# Patient Record
Sex: MALE | Race: WHITE | NOT HISPANIC OR LATINO
[De-identification: names, ages, dates, MRNs, and addresses within clinical notes are randomized per-mention and may not be internally consistent; named-entity substitution may affect disease eponyms.]

---

## 2018-02-15 ENCOUNTER — APPOINTMENT (OUTPATIENT)
Dept: GERIATRICS | Facility: CLINIC | Age: 83
End: 2018-02-15
Payer: MEDICARE

## 2018-02-15 VITALS
HEART RATE: 69 BPM | WEIGHT: 156 LBS | DIASTOLIC BLOOD PRESSURE: 80 MMHG | RESPIRATION RATE: 17 BRPM | BODY MASS INDEX: 23.64 KG/M2 | SYSTOLIC BLOOD PRESSURE: 140 MMHG | HEIGHT: 68 IN | OXYGEN SATURATION: 90 % | TEMPERATURE: 98.3 F

## 2018-02-15 DIAGNOSIS — E13.65 OTHER SPECIFIED DIABETES MELLITUS WITH HYPERGLYCEMIA: ICD-10-CM

## 2018-02-15 PROCEDURE — 99204 OFFICE O/P NEW MOD 45 MIN: CPT | Mod: GC

## 2018-02-16 ENCOUNTER — INPATIENT (INPATIENT)
Facility: HOSPITAL | Age: 83
LOS: 4 days | Discharge: HOME | DRG: 292 | End: 2018-02-21
Attending: INTERNAL MEDICINE | Admitting: INTERNAL MEDICINE
Payer: MEDICARE

## 2018-02-16 VITALS
OXYGEN SATURATION: 98 % | HEIGHT: 68 IN | SYSTOLIC BLOOD PRESSURE: 173 MMHG | RESPIRATION RATE: 20 BRPM | DIASTOLIC BLOOD PRESSURE: 68 MMHG | HEART RATE: 68 BPM | TEMPERATURE: 97 F | WEIGHT: 134.92 LBS

## 2018-02-16 DIAGNOSIS — Z98.890 OTHER SPECIFIED POSTPROCEDURAL STATES: Chronic | ICD-10-CM

## 2018-02-16 DIAGNOSIS — Z71.89 OTHER SPECIFIED COUNSELING: ICD-10-CM

## 2018-02-16 DIAGNOSIS — Z29.9 ENCOUNTER FOR PROPHYLACTIC MEASURES, UNSPECIFIED: ICD-10-CM

## 2018-02-16 DIAGNOSIS — N17.9 ACUTE KIDNEY FAILURE, UNSPECIFIED: ICD-10-CM

## 2018-02-16 DIAGNOSIS — I50.9 HEART FAILURE, UNSPECIFIED: ICD-10-CM

## 2018-02-16 DIAGNOSIS — I10 ESSENTIAL (PRIMARY) HYPERTENSION: ICD-10-CM

## 2018-02-16 DIAGNOSIS — F03.90 UNSPECIFIED DEMENTIA WITHOUT BEHAVIORAL DISTURBANCE: ICD-10-CM

## 2018-02-16 DIAGNOSIS — E11.9 TYPE 2 DIABETES MELLITUS WITHOUT COMPLICATIONS: ICD-10-CM

## 2018-02-16 LAB
ALBUMIN SERPL ELPH-MCNC: 3.2 G/DL — LOW (ref 3.5–5)
ALBUMIN SERPL ELPH-MCNC: 4 G/DL
ALP BLD-CCNC: 105 U/L
ALP SERPL-CCNC: 124 U/L — HIGH (ref 40–120)
ALT FLD-CCNC: 28 U/L DA — SIGNIFICANT CHANGE UP (ref 10–60)
ALT SERPL-CCNC: 21 U/L
ANION GAP SERPL CALC-SCNC: 16 MMOL/L
ANION GAP SERPL CALC-SCNC: 9 MMOL/L — SIGNIFICANT CHANGE UP (ref 5–17)
APTT BLD: 31.7 SEC — SIGNIFICANT CHANGE UP (ref 27.5–37.4)
AST SERPL-CCNC: 26 U/L
AST SERPL-CCNC: 32 U/L — SIGNIFICANT CHANGE UP (ref 10–40)
BASE EXCESS BLDV CALC-SCNC: 0.2 MMOL/L — SIGNIFICANT CHANGE UP (ref -2–2)
BASOPHILS # BLD AUTO: 0.01 K/UL
BASOPHILS # BLD AUTO: 0.1 K/UL — SIGNIFICANT CHANGE UP (ref 0–0.2)
BASOPHILS NFR BLD AUTO: 0.1 %
BASOPHILS NFR BLD AUTO: 0.8 % — SIGNIFICANT CHANGE UP (ref 0–2)
BILIRUB SERPL-MCNC: 0.3 MG/DL
BILIRUB SERPL-MCNC: 0.5 MG/DL — SIGNIFICANT CHANGE UP (ref 0.2–1.2)
BLOOD GAS COMMENTS, VENOUS: SIGNIFICANT CHANGE UP
BUN SERPL-MCNC: 29 MG/DL — HIGH (ref 7–18)
BUN SERPL-MCNC: 32 MG/DL
CALCIUM SERPL-MCNC: 8.3 MG/DL — LOW (ref 8.4–10.5)
CALCIUM SERPL-MCNC: 8.6 MG/DL
CHLORIDE SERPL-SCNC: 102 MMOL/L — SIGNIFICANT CHANGE UP (ref 96–108)
CHLORIDE SERPL-SCNC: 103 MMOL/L
CK MB BLD-MCNC: 2 % — SIGNIFICANT CHANGE UP (ref 0–3.5)
CK MB CFR SERPL CALC: 3.5 NG/ML — SIGNIFICANT CHANGE UP (ref 0–3.6)
CK SERPL-CCNC: 171 U/L — SIGNIFICANT CHANGE UP (ref 35–232)
CO2 SERPL-SCNC: 24 MMOL/L
CO2 SERPL-SCNC: 28 MMOL/L — SIGNIFICANT CHANGE UP (ref 22–31)
CREAT SERPL-MCNC: 1.45 MG/DL — HIGH (ref 0.5–1.3)
CREAT SERPL-MCNC: 1.53 MG/DL
CREAT SPEC-SCNC: 111 MG/DL
EOSINOPHIL # BLD AUTO: 0.2 K/UL — SIGNIFICANT CHANGE UP (ref 0–0.5)
EOSINOPHIL # BLD AUTO: 0.23 K/UL
EOSINOPHIL NFR BLD AUTO: 1.9 % — SIGNIFICANT CHANGE UP (ref 0–6)
EOSINOPHIL NFR BLD AUTO: 2.6 %
FOLATE SERPL-MCNC: 9.3 NG/ML
GLUCOSE SERPL-MCNC: 131 MG/DL — HIGH (ref 70–99)
GLUCOSE SERPL-MCNC: 75 MG/DL
HBA1C MFR BLD HPLC: 7.5 %
HCO3 BLDV-SCNC: 27 MMOL/L — SIGNIFICANT CHANGE UP (ref 21–29)
HCT VFR BLD CALC: 33.1 %
HCT VFR BLD CALC: 35.5 % — LOW (ref 39–50)
HGB BLD-MCNC: 10.2 G/DL
HGB BLD-MCNC: 10.9 G/DL — LOW (ref 13–17)
HOROWITZ INDEX BLDV+IHG-RTO: 21 — SIGNIFICANT CHANGE UP
IMM GRANULOCYTES NFR BLD AUTO: 0.1 %
INR BLD: 1.11 RATIO — SIGNIFICANT CHANGE UP (ref 0.88–1.16)
LYMPHOCYTES # BLD AUTO: 1.84 K/UL
LYMPHOCYTES # BLD AUTO: 2.9 K/UL — SIGNIFICANT CHANGE UP (ref 1–3.3)
LYMPHOCYTES # BLD AUTO: 25.5 % — SIGNIFICANT CHANGE UP (ref 13–44)
LYMPHOCYTES NFR BLD AUTO: 21.2 %
MAN DIFF?: NORMAL
MCHC RBC-ENTMCNC: 30.4 PG
MCHC RBC-ENTMCNC: 30.4 PG — SIGNIFICANT CHANGE UP (ref 27–34)
MCHC RBC-ENTMCNC: 30.8 GM/DL
MCHC RBC-ENTMCNC: 30.8 GM/DL — LOW (ref 32–36)
MCV RBC AUTO: 98.5 FL
MCV RBC AUTO: 98.7 FL — SIGNIFICANT CHANGE UP (ref 80–100)
MICROALBUMIN 24H UR DL<=1MG/L-MCNC: 70.3 MG/DL
MICROALBUMIN/CREAT 24H UR-RTO: 633 MG/G
MONOCYTES # BLD AUTO: 0.72 K/UL
MONOCYTES # BLD AUTO: 0.9 K/UL — SIGNIFICANT CHANGE UP (ref 0–0.9)
MONOCYTES NFR BLD AUTO: 7.6 % — SIGNIFICANT CHANGE UP (ref 2–14)
MONOCYTES NFR BLD AUTO: 8.3 %
NEUTROPHILS # BLD AUTO: 5.88 K/UL
NEUTROPHILS # BLD AUTO: 7.2 K/UL — SIGNIFICANT CHANGE UP (ref 1.8–7.4)
NEUTROPHILS NFR BLD AUTO: 64.2 % — SIGNIFICANT CHANGE UP (ref 43–77)
NEUTROPHILS NFR BLD AUTO: 67.7 %
NT-PROBNP SERPL-SCNC: 5107 PG/ML — HIGH (ref 0–450)
PCO2 BLDV: 60 MMHG — HIGH (ref 35–50)
PH BLDV: 7.28 — LOW (ref 7.35–7.45)
PLATELET # BLD AUTO: 196 K/UL
PLATELET # BLD AUTO: 220 K/UL — SIGNIFICANT CHANGE UP (ref 150–400)
PO2 BLDV: 111 MMHG — HIGH (ref 25–45)
POTASSIUM SERPL-MCNC: 3.3 MMOL/L — LOW (ref 3.5–5.3)
POTASSIUM SERPL-SCNC: 3.3 MMOL/L — LOW (ref 3.5–5.3)
POTASSIUM SERPL-SCNC: 4.2 MMOL/L
PROT SERPL-MCNC: 6.9 G/DL
PROT SERPL-MCNC: 8 G/DL — SIGNIFICANT CHANGE UP (ref 6–8.3)
PROTHROM AB SERPL-ACNC: 12.1 SEC — SIGNIFICANT CHANGE UP (ref 9.8–12.7)
RBC # BLD: 3.36 M/UL
RBC # BLD: 3.59 M/UL — LOW (ref 4.2–5.8)
RBC # FLD: 13.4 % — SIGNIFICANT CHANGE UP (ref 10.3–14.5)
RBC # FLD: 14.6 %
SAO2 % BLDV: 98 % — HIGH (ref 67–88)
SODIUM SERPL-SCNC: 139 MMOL/L — SIGNIFICANT CHANGE UP (ref 135–145)
SODIUM SERPL-SCNC: 143 MMOL/L
TROPONIN I SERPL-MCNC: <0.015 NG/ML — SIGNIFICANT CHANGE UP (ref 0–0.04)
TSH SERPL-ACNC: 2.79 UIU/ML
VIT B12 SERPL-MCNC: 1287 PG/ML
WBC # BLD: 11.3 K/UL — HIGH (ref 3.8–10.5)
WBC # FLD AUTO: 11.3 K/UL — HIGH (ref 3.8–10.5)
WBC # FLD AUTO: 8.69 K/UL

## 2018-02-16 PROCEDURE — 71045 X-RAY EXAM CHEST 1 VIEW: CPT | Mod: 26

## 2018-02-16 PROCEDURE — 99285 EMERGENCY DEPT VISIT HI MDM: CPT

## 2018-02-16 RX ORDER — DULOXETINE HYDROCHLORIDE 30 MG/1
30 CAPSULE, DELAYED RELEASE ORAL DAILY
Qty: 0 | Refills: 0 | Status: DISCONTINUED | OUTPATIENT
Start: 2018-02-16 | End: 2018-02-21

## 2018-02-16 RX ORDER — SIMVASTATIN 20 MG/1
5 TABLET, FILM COATED ORAL AT BEDTIME
Qty: 0 | Refills: 0 | Status: DISCONTINUED | OUTPATIENT
Start: 2018-02-16 | End: 2018-02-21

## 2018-02-16 RX ORDER — ATENOLOL 25 MG/1
50 TABLET ORAL DAILY
Qty: 0 | Refills: 0 | Status: DISCONTINUED | OUTPATIENT
Start: 2018-02-17 | End: 2018-02-21

## 2018-02-16 RX ORDER — INSULIN GLARGINE 100 [IU]/ML
7 INJECTION, SOLUTION SUBCUTANEOUS AT BEDTIME
Qty: 0 | Refills: 0 | Status: DISCONTINUED | OUTPATIENT
Start: 2018-02-17 | End: 2018-02-21

## 2018-02-16 RX ORDER — DONEPEZIL HYDROCHLORIDE 10 MG/1
10 TABLET, FILM COATED ORAL AT BEDTIME
Qty: 0 | Refills: 0 | Status: DISCONTINUED | OUTPATIENT
Start: 2018-02-16 | End: 2018-02-21

## 2018-02-16 RX ORDER — POTASSIUM CHLORIDE 20 MEQ
40 PACKET (EA) ORAL ONCE
Qty: 0 | Refills: 0 | Status: COMPLETED | OUTPATIENT
Start: 2018-02-16 | End: 2018-02-16

## 2018-02-16 RX ORDER — LEVOTHYROXINE SODIUM 125 MCG
112 TABLET ORAL DAILY
Qty: 0 | Refills: 0 | Status: DISCONTINUED | OUTPATIENT
Start: 2018-02-16 | End: 2018-02-21

## 2018-02-16 RX ORDER — ASPIRIN/CALCIUM CARB/MAGNESIUM 324 MG
81 TABLET ORAL DAILY
Qty: 0 | Refills: 0 | Status: DISCONTINUED | OUTPATIENT
Start: 2018-02-16 | End: 2018-02-21

## 2018-02-16 RX ORDER — FUROSEMIDE 40 MG
40 TABLET ORAL DAILY
Qty: 0 | Refills: 0 | Status: DISCONTINUED | OUTPATIENT
Start: 2018-02-17 | End: 2018-02-18

## 2018-02-16 RX ORDER — AMLODIPINE BESYLATE 2.5 MG/1
10 TABLET ORAL DAILY
Qty: 0 | Refills: 0 | Status: DISCONTINUED | OUTPATIENT
Start: 2018-02-16 | End: 2018-02-21

## 2018-02-16 RX ORDER — FUROSEMIDE 40 MG
40 TABLET ORAL ONCE
Qty: 0 | Refills: 0 | Status: COMPLETED | OUTPATIENT
Start: 2018-02-16 | End: 2018-02-16

## 2018-02-16 RX ORDER — INSULIN LISPRO 100/ML
VIAL (ML) SUBCUTANEOUS
Qty: 0 | Refills: 0 | Status: DISCONTINUED | OUTPATIENT
Start: 2018-02-16 | End: 2018-02-21

## 2018-02-16 RX ORDER — PRAMIPEXOLE DIHYDROCHLORIDE 0.12 MG/1
0.25 TABLET ORAL THREE TIMES A DAY
Qty: 0 | Refills: 0 | Status: DISCONTINUED | OUTPATIENT
Start: 2018-02-16 | End: 2018-02-21

## 2018-02-16 RX ORDER — QUETIAPINE FUMARATE 200 MG/1
25 TABLET, FILM COATED ORAL DAILY
Qty: 0 | Refills: 0 | Status: DISCONTINUED | OUTPATIENT
Start: 2018-02-16 | End: 2018-02-21

## 2018-02-16 RX ORDER — HEPARIN SODIUM 5000 [USP'U]/ML
5000 INJECTION INTRAVENOUS; SUBCUTANEOUS EVERY 12 HOURS
Qty: 0 | Refills: 0 | Status: DISCONTINUED | OUTPATIENT
Start: 2018-02-16 | End: 2018-02-21

## 2018-02-16 RX ADMIN — Medication 40 MILLIGRAM(S): at 17:21

## 2018-02-16 RX ADMIN — Medication 40 MILLIEQUIVALENT(S): at 23:44

## 2018-02-16 NOTE — H&P ADULT - FAMILY HISTORY
Mother  Still living? Unknown  Family history of stroke, Age at diagnosis: Age Unknown  Family history of hypertension, Age at diagnosis: Age Unknown

## 2018-02-16 NOTE — H&P ADULT - PROBLEM SELECTOR PLAN 1
-No known prior history as per patient's wife.   -CXR: symmetrically prominent vascular markings in mid lower lung field suggesting CHF.   -Pro BNP 5107.   -EKG: NSR with TWI in 1, aVL (no prior ekg to compare).   -Trop x 1 is negative.   -c/w IV Lasix 40 mg daily, Strict I/Os, daily weights.   -f/u ECHO.   -Cardio Dr. Florentino

## 2018-02-16 NOTE — H&P ADULT - PROBLEM SELECTOR PLAN 2
-per wife, patient was seen by PCP recently and told that his kidney function is slightly affected, though not sure of exact numbers.   -Cr of 1.45 on admission, BUN / Cr ratio is 20.   -Send urine lytes.  -Monitor BMP, if Cr does not improve consider renal US.

## 2018-02-16 NOTE — H&P ADULT - PROBLEM SELECTOR PLAN 4
-Patient takes Insulin Toujeo 7 U qhs, Humalog 5 U tid pre meals and Glipizide 5 mg at home  -c/w Lantus 7 U qhs and HSS   -monitor accu cheks as hs and adjust insulin regimen if needed  -f/u HbA1c

## 2018-02-16 NOTE — H&P ADULT - RS GEN PE MLT RESP DETAILS PC
airway patent/clear to auscultation bilaterally/breath sounds equal/respirations non-labored/good air movement

## 2018-02-16 NOTE — H&P ADULT - PROBLEM SELECTOR PLAN 7
-Discussed with HCP (wife: Albina Estrella [phone]) over the phone. She wants to speak to son and make the decision.   -FULL CODE for now.

## 2018-02-16 NOTE — ED PROVIDER NOTE - PSYCHIATRIC, MLM
Alert and oriented to person, place, time. normal mood and affect. no apparent risk to self or others.

## 2018-02-16 NOTE — H&P ADULT - HISTORY OF PRESENT ILLNESS
88 years old male from home, lives with wife, FELICITY x2 at baseline, ambulates with walker, PMH of HTN, DM and Dementia and PSH of Hernia surgery brought to ED by wife for shortness of breath since last night. Patient was seen by his PCP yesterday, had blood and urine tests done and was told his kidney function is slightly affected. Per wife no other complains like chest pain, palpitations, nausea, vomiting, diarrhea, fever, chills, recent travel or sick contacts. Patient has got his flu vaccine and Pneumonia vaccine as per wife. Recent hospitalization after he threatened his wife and police was called. 88 years old male from home, lives with wife, FELICITY x2 at baseline, ambulates with walker, PMH of HTN, DM and Dementia and PSH of Hernia surgery brought to ED by wife for shortness of breath since last night. Patient was seen by his PCP yesterday, had blood and urine tests done and was told his kidney function is slightly affected. Patient is AAO x2 currently , denies any complains and looks anxious. Per wife no other complains like chest pain, palpitations, nausea, vomiting, diarrhea, fever, chills, recent travel or sick contacts. Patient has got his flu vaccine and Pneumonia vaccine as per wife. Had a recent hospitalization i after he threatened his wife and police was called. Has a bruise on right flank from a fall past weekend.

## 2018-02-16 NOTE — ED PROVIDER NOTE - OBJECTIVE STATEMENT
patient was brought in by his wife as he was having difficulty breathing last night. she states he saw his pmd yesterday and had blood and urine tests done and she was told he has a problem with his kidney labs. the patient stated he had diarrhea last night but the wife didn't see this and doesn't think its true due to dementia. he has a bruise on his right flank from a fall this past weekend.

## 2018-02-17 LAB
ANION GAP SERPL CALC-SCNC: 12 MMOL/L — SIGNIFICANT CHANGE UP (ref 5–17)
BASOPHILS # BLD AUTO: 0 K/UL — SIGNIFICANT CHANGE UP (ref 0–0.2)
BASOPHILS NFR BLD AUTO: 0.5 % — SIGNIFICANT CHANGE UP (ref 0–2)
BUN SERPL-MCNC: 30 MG/DL — HIGH (ref 7–18)
CALCIUM SERPL-MCNC: 8.6 MG/DL — SIGNIFICANT CHANGE UP (ref 8.4–10.5)
CHLORIDE SERPL-SCNC: 101 MMOL/L — SIGNIFICANT CHANGE UP (ref 96–108)
CHOLEST SERPL-MCNC: 114 MG/DL — SIGNIFICANT CHANGE UP (ref 10–199)
CK MB BLD-MCNC: 2.6 % — SIGNIFICANT CHANGE UP (ref 0–3.5)
CK MB BLD-MCNC: 2.7 % — SIGNIFICANT CHANGE UP (ref 0–3.5)
CK MB CFR SERPL CALC: 4.7 NG/ML — HIGH (ref 0–3.6)
CK MB CFR SERPL CALC: 5 NG/ML — HIGH (ref 0–3.6)
CK SERPL-CCNC: 178 U/L — SIGNIFICANT CHANGE UP (ref 35–232)
CK SERPL-CCNC: 185 U/L — SIGNIFICANT CHANGE UP (ref 35–232)
CO2 SERPL-SCNC: 26 MMOL/L — SIGNIFICANT CHANGE UP (ref 22–31)
CREAT SERPL-MCNC: 1.61 MG/DL — HIGH (ref 0.5–1.3)
EOSINOPHIL # BLD AUTO: 0 K/UL — SIGNIFICANT CHANGE UP (ref 0–0.5)
EOSINOPHIL NFR BLD AUTO: 0.5 % — SIGNIFICANT CHANGE UP (ref 0–6)
FOLATE SERPL-MCNC: 7 NG/ML — SIGNIFICANT CHANGE UP (ref 4.8–24.2)
GLUCOSE BLDC GLUCOMTR-MCNC: 172 MG/DL — HIGH (ref 70–99)
GLUCOSE BLDC GLUCOMTR-MCNC: 175 MG/DL — HIGH (ref 70–99)
GLUCOSE BLDC GLUCOMTR-MCNC: 181 MG/DL — HIGH (ref 70–99)
GLUCOSE BLDC GLUCOMTR-MCNC: 232 MG/DL — HIGH (ref 70–99)
GLUCOSE SERPL-MCNC: 239 MG/DL — HIGH (ref 70–99)
HBA1C BLD-MCNC: 7.7 % — HIGH (ref 4–5.6)
HCT VFR BLD CALC: 32.5 % — LOW (ref 39–50)
HDLC SERPL-MCNC: 33 MG/DL — LOW (ref 40–125)
HGB BLD-MCNC: 10.5 G/DL — LOW (ref 13–17)
LIPID PNL WITH DIRECT LDL SERPL: 51 MG/DL — SIGNIFICANT CHANGE UP
LYMPHOCYTES # BLD AUTO: 1 K/UL — SIGNIFICANT CHANGE UP (ref 1–3.3)
LYMPHOCYTES # BLD AUTO: 11.3 % — LOW (ref 13–44)
MAGNESIUM SERPL-MCNC: 2.1 MG/DL — SIGNIFICANT CHANGE UP (ref 1.6–2.6)
MCHC RBC-ENTMCNC: 32.2 GM/DL — SIGNIFICANT CHANGE UP (ref 32–36)
MCHC RBC-ENTMCNC: 32.4 PG — SIGNIFICANT CHANGE UP (ref 27–34)
MCV RBC AUTO: 100.6 FL — HIGH (ref 80–100)
MONOCYTES # BLD AUTO: 0.7 K/UL — SIGNIFICANT CHANGE UP (ref 0–0.9)
MONOCYTES NFR BLD AUTO: 8.3 % — SIGNIFICANT CHANGE UP (ref 2–14)
NEUTROPHILS # BLD AUTO: 7.1 K/UL — SIGNIFICANT CHANGE UP (ref 1.8–7.4)
NEUTROPHILS NFR BLD AUTO: 79.4 % — HIGH (ref 43–77)
PHOSPHATE SERPL-MCNC: 3.6 MG/DL — SIGNIFICANT CHANGE UP (ref 2.5–4.5)
PLATELET # BLD AUTO: 196 K/UL — SIGNIFICANT CHANGE UP (ref 150–400)
POTASSIUM SERPL-MCNC: 3.6 MMOL/L — SIGNIFICANT CHANGE UP (ref 3.5–5.3)
POTASSIUM SERPL-SCNC: 3.6 MMOL/L — SIGNIFICANT CHANGE UP (ref 3.5–5.3)
RBC # BLD: 3.23 M/UL — LOW (ref 4.2–5.8)
RBC # FLD: 13.4 % — SIGNIFICANT CHANGE UP (ref 10.3–14.5)
SODIUM SERPL-SCNC: 139 MMOL/L — SIGNIFICANT CHANGE UP (ref 135–145)
TOTAL CHOLESTEROL/HDL RATIO MEASUREMENT: 3.5 RATIO — SIGNIFICANT CHANGE UP (ref 3.4–9.6)
TRIGL SERPL-MCNC: 148 MG/DL — SIGNIFICANT CHANGE UP (ref 10–149)
TROPONIN I SERPL-MCNC: 0.03 NG/ML — SIGNIFICANT CHANGE UP (ref 0–0.04)
TROPONIN I SERPL-MCNC: 0.03 NG/ML — SIGNIFICANT CHANGE UP (ref 0–0.04)
TSH SERPL-MCNC: 3.16 UU/ML — SIGNIFICANT CHANGE UP (ref 0.34–4.82)
VIT B12 SERPL-MCNC: 1450 PG/ML — HIGH (ref 232–1245)
WBC # BLD: 9 K/UL — SIGNIFICANT CHANGE UP (ref 3.8–10.5)
WBC # FLD AUTO: 9 K/UL — SIGNIFICANT CHANGE UP (ref 3.8–10.5)

## 2018-02-17 RX ORDER — IPRATROPIUM/ALBUTEROL SULFATE 18-103MCG
3 AEROSOL WITH ADAPTER (GRAM) INHALATION ONCE
Qty: 0 | Refills: 0 | Status: COMPLETED | OUTPATIENT
Start: 2018-02-17 | End: 2018-02-17

## 2018-02-17 RX ADMIN — Medication 200 MILLIGRAM(S): at 22:46

## 2018-02-17 RX ADMIN — ATENOLOL 50 MILLIGRAM(S): 25 TABLET ORAL at 05:36

## 2018-02-17 RX ADMIN — Medication 40 MILLIGRAM(S): at 05:39

## 2018-02-17 RX ADMIN — DONEPEZIL HYDROCHLORIDE 10 MILLIGRAM(S): 10 TABLET, FILM COATED ORAL at 21:41

## 2018-02-17 RX ADMIN — Medication 81 MILLIGRAM(S): at 12:12

## 2018-02-17 RX ADMIN — AMLODIPINE BESYLATE 10 MILLIGRAM(S): 2.5 TABLET ORAL at 01:22

## 2018-02-17 RX ADMIN — Medication 3 MILLILITER(S): at 05:02

## 2018-02-17 RX ADMIN — PRAMIPEXOLE DIHYDROCHLORIDE 0.25 MILLIGRAM(S): 0.12 TABLET ORAL at 05:36

## 2018-02-17 RX ADMIN — PRAMIPEXOLE DIHYDROCHLORIDE 0.25 MILLIGRAM(S): 0.12 TABLET ORAL at 21:42

## 2018-02-17 RX ADMIN — HEPARIN SODIUM 5000 UNIT(S): 5000 INJECTION INTRAVENOUS; SUBCUTANEOUS at 05:39

## 2018-02-17 RX ADMIN — Medication 112 MICROGRAM(S): at 05:36

## 2018-02-17 RX ADMIN — SIMVASTATIN 5 MILLIGRAM(S): 20 TABLET, FILM COATED ORAL at 21:43

## 2018-02-17 RX ADMIN — QUETIAPINE FUMARATE 25 MILLIGRAM(S): 200 TABLET, FILM COATED ORAL at 12:12

## 2018-02-17 RX ADMIN — Medication 1: at 12:06

## 2018-02-17 RX ADMIN — Medication 1: at 17:08

## 2018-02-17 RX ADMIN — INSULIN GLARGINE 7 UNIT(S): 100 INJECTION, SOLUTION SUBCUTANEOUS at 21:41

## 2018-02-17 RX ADMIN — PRAMIPEXOLE DIHYDROCHLORIDE 0.25 MILLIGRAM(S): 0.12 TABLET ORAL at 16:07

## 2018-02-17 RX ADMIN — HEPARIN SODIUM 5000 UNIT(S): 5000 INJECTION INTRAVENOUS; SUBCUTANEOUS at 17:09

## 2018-02-17 RX ADMIN — DULOXETINE HYDROCHLORIDE 30 MILLIGRAM(S): 30 CAPSULE, DELAYED RELEASE ORAL at 12:12

## 2018-02-17 RX ADMIN — Medication 2: at 08:06

## 2018-02-17 NOTE — PHYSICAL THERAPY INITIAL EVALUATION ADULT - ADDITIONAL COMMENTS
Pt claims to be modified independent in basic ADLs, ambulates ad christiano in the house with RW, wife assists as needed

## 2018-02-17 NOTE — PHYSICAL THERAPY INITIAL EVALUATION ADULT - GENERAL OBSERVATIONS, REHAB EVAL
Pt seen supine in bed w/tele, denied any c/o pain, very [place], was cooperative during assessment despite repetitively saying he's in the bad mood

## 2018-02-17 NOTE — CONSULT NOTE ADULT - SUBJECTIVE AND OBJECTIVE BOX
CARDIOLOGY ATTENDING      HISTORY OF PRESENT ILLNESS: He is an 87 y/o male PMH mild dementia admitted with dyspnea. CXR is concerning for pulmonary edema. No echo on file. He denies palpitations, syncope, nor angina.      PAST MEDICAL & SURGICAL HISTORY:  Dementia  HTN (hypertension)  Diabetes  History of hernia surgery      MEDICATIONS  (STANDING):  amLODIPine   Tablet 10 milliGRAM(s) Oral daily  aspirin  chewable 81 milliGRAM(s) Oral daily  ATENolol  Tablet 50 milliGRAM(s) Oral daily  donepezil 10 milliGRAM(s) Oral at bedtime  DULoxetine 30 milliGRAM(s) Oral daily  furosemide   Injectable 40 milliGRAM(s) IV Push daily  heparin  Injectable 5000 Unit(s) SubCutaneous every 12 hours  insulin glargine Injectable (LANTUS) 7 Unit(s) SubCutaneous at bedtime  insulin lispro (HumaLOG) corrective regimen sliding scale   SubCutaneous three times a day before meals  levothyroxine 112 MICROGram(s) Oral daily  pramipexole 0.25 milliGRAM(s) Oral three times a day  pregabalin 200 milliGRAM(s) Oral at bedtime  QUEtiapine 25 milliGRAM(s) Oral daily  simvastatin 5 milliGRAM(s) Oral at bedtime    no Known Allergies    FAMILY HISTORY:  Family history of hypertension (Mother)  Family history of stroke (Mother)    Non-contributary for premature coronary disease or sudden cardiac death    SOCIAL HISTORY:    [x ] Non-smoker  [ ] Smoker  [ ] Alcohol      REVIEW OF SYSTEMS:  [ ]chest pain  [ x ]shortness of breath  [  ]palpitations  [  ]syncope  [ ]near syncope [ ]upper extremity weakness   [ ] lower extremity weakness  [  ]diplopia  [  ]altered mental status   [  ]fevers  [ ]chills [ ]nausea  [ ]vomitting  [  ]dysphagia    [ ]abdominal pain  [ ]melena  [ ]BRBPR    [  ]epistaxis  [  ]rash    [ ]lower extremity edema        [x ] All others negative	  [ ] Unable to obtain    PHYSICAL EXAM:  T(C): 36.8 (02-17-18 @ 12:02), Max: 36.8 (02-17-18 @ 12:02)  HR: 64 (02-17-18 @ 12:02) (64 - 80)  BP: 157/62 (02-17-18 @ 12:02) (149/62 - 186/58)  RR: 18 (02-17-18 @ 12:02) (17 - 20)  SpO2: 100% (02-17-18 @ 12:02) (95% - 100%)  Wt(kg): --    no JVD  RRR, no murmurs  CTAB  soft nt/nd  no c/c/e      TELEMETRY: 	  no events  ECG:  	NSR, Twi in I/L    Echo: pending  NST: n/a  Cath: n/a  	  	  LABS:	 	                          10.5   9.0   )-----------( 196      ( 17 Feb 2018 07:50 )             32.5     02-17    139  |  101  |  30<H>  ----------------------------<  239<H>  3.6   |  26  |  1.61<H>    Ca    8.6      17 Feb 2018 07:50  Phos  3.6     02-17  Mg     2.1     02-17    TPro  8.0  /  Alb  3.2<L>  /  TBili  0.5  /  DBili  x   /  AST  32  /  ALT  28  /  AlkPhos  124<H>  02-16    proBNP: Serum Pro-Brain Natriuretic Peptide: 5107 pg/mL (02-16 @ 17:13)    Lipid Profile:   HgA1c:   TSH: Thyroid Stimulating Hormone, Serum: 3.16 uU/mL (02-17 @ 07:50)      A/P) He is an 87 y/o male PMH mild dementia admitted with dyspnea. CXR is concerning for pulmonary edema. No echo on file. He denies palpitations, syncope, nor angina.    -his signs and symptoms are concerning for CHF  -start lasix 40mg iv q12h  -get echo  -final cardiology reccs pending echo      hCang Parr M.D., [facility]  Cardiac Electrophysiology  [facility]  [address] [place] [address], [address] [place] [address]  [email or web address]    office [phone]  pager [phone]

## 2018-02-18 ENCOUNTER — TRANSCRIPTION ENCOUNTER (OUTPATIENT)
Age: 83
End: 2018-02-18

## 2018-02-18 LAB
24R-OH-CALCIDIOL SERPL-MCNC: 14.2 NG/ML — LOW (ref 30–80)
ANION GAP SERPL CALC-SCNC: 8 MMOL/L — SIGNIFICANT CHANGE UP (ref 5–17)
BASOPHILS # BLD AUTO: 0.1 K/UL — SIGNIFICANT CHANGE UP (ref 0–0.2)
BASOPHILS NFR BLD AUTO: 1 % — SIGNIFICANT CHANGE UP (ref 0–2)
BUN SERPL-MCNC: 33 MG/DL — HIGH (ref 7–18)
CALCIUM SERPL-MCNC: 8.1 MG/DL — LOW (ref 8.4–10.5)
CHLORIDE SERPL-SCNC: 100 MMOL/L — SIGNIFICANT CHANGE UP (ref 96–108)
CO2 SERPL-SCNC: 33 MMOL/L — HIGH (ref 22–31)
CREAT SERPL-MCNC: 1.33 MG/DL — HIGH (ref 0.5–1.3)
EOSINOPHIL # BLD AUTO: 0.3 K/UL — SIGNIFICANT CHANGE UP (ref 0–0.5)
EOSINOPHIL NFR BLD AUTO: 5.3 % — SIGNIFICANT CHANGE UP (ref 0–6)
GLUCOSE BLDC GLUCOMTR-MCNC: 125 MG/DL — HIGH (ref 70–99)
GLUCOSE BLDC GLUCOMTR-MCNC: 160 MG/DL — HIGH (ref 70–99)
GLUCOSE BLDC GLUCOMTR-MCNC: 254 MG/DL — HIGH (ref 70–99)
GLUCOSE BLDC GLUCOMTR-MCNC: 271 MG/DL — HIGH (ref 70–99)
GLUCOSE SERPL-MCNC: 121 MG/DL — HIGH (ref 70–99)
HCT VFR BLD CALC: 31.8 % — LOW (ref 39–50)
HGB BLD-MCNC: 10.1 G/DL — LOW (ref 13–17)
LYMPHOCYTES # BLD AUTO: 1.5 K/UL — SIGNIFICANT CHANGE UP (ref 1–3.3)
LYMPHOCYTES # BLD AUTO: 23.8 % — SIGNIFICANT CHANGE UP (ref 13–44)
MCHC RBC-ENTMCNC: 31.8 GM/DL — LOW (ref 32–36)
MCHC RBC-ENTMCNC: 31.8 PG — SIGNIFICANT CHANGE UP (ref 27–34)
MCV RBC AUTO: 100.3 FL — HIGH (ref 80–100)
MONOCYTES # BLD AUTO: 0.6 K/UL — SIGNIFICANT CHANGE UP (ref 0–0.9)
MONOCYTES NFR BLD AUTO: 10.3 % — SIGNIFICANT CHANGE UP (ref 2–14)
NEUTROPHILS # BLD AUTO: 3.7 K/UL — SIGNIFICANT CHANGE UP (ref 1.8–7.4)
NEUTROPHILS NFR BLD AUTO: 59.6 % — SIGNIFICANT CHANGE UP (ref 43–77)
PLATELET # BLD AUTO: 213 K/UL — SIGNIFICANT CHANGE UP (ref 150–400)
POTASSIUM SERPL-MCNC: 2.9 MMOL/L — CRITICAL LOW (ref 3.5–5.3)
POTASSIUM SERPL-SCNC: 2.9 MMOL/L — CRITICAL LOW (ref 3.5–5.3)
RBC # BLD: 3.18 M/UL — LOW (ref 4.2–5.8)
RBC # FLD: 13.1 % — SIGNIFICANT CHANGE UP (ref 10.3–14.5)
SODIUM SERPL-SCNC: 141 MMOL/L — SIGNIFICANT CHANGE UP (ref 135–145)
WBC # BLD: 6.2 K/UL — SIGNIFICANT CHANGE UP (ref 3.8–10.5)
WBC # FLD AUTO: 6.2 K/UL — SIGNIFICANT CHANGE UP (ref 3.8–10.5)

## 2018-02-18 RX ORDER — FUROSEMIDE 40 MG
40 TABLET ORAL DAILY
Qty: 0 | Refills: 0 | Status: DISCONTINUED | OUTPATIENT
Start: 2018-02-19 | End: 2018-02-20

## 2018-02-18 RX ORDER — ERGOCALCIFEROL 1.25 MG/1
50000 CAPSULE ORAL
Qty: 0 | Refills: 0 | Status: DISCONTINUED | OUTPATIENT
Start: 2018-02-18 | End: 2018-02-21

## 2018-02-18 RX ORDER — POTASSIUM CHLORIDE 20 MEQ
40 PACKET (EA) ORAL EVERY 4 HOURS
Qty: 0 | Refills: 0 | Status: COMPLETED | OUTPATIENT
Start: 2018-02-18 | End: 2018-02-18

## 2018-02-18 RX ADMIN — PRAMIPEXOLE DIHYDROCHLORIDE 0.25 MILLIGRAM(S): 0.12 TABLET ORAL at 17:45

## 2018-02-18 RX ADMIN — Medication 81 MILLIGRAM(S): at 12:35

## 2018-02-18 RX ADMIN — QUETIAPINE FUMARATE 25 MILLIGRAM(S): 200 TABLET, FILM COATED ORAL at 12:33

## 2018-02-18 RX ADMIN — AMLODIPINE BESYLATE 10 MILLIGRAM(S): 2.5 TABLET ORAL at 05:43

## 2018-02-18 RX ADMIN — PRAMIPEXOLE DIHYDROCHLORIDE 0.25 MILLIGRAM(S): 0.12 TABLET ORAL at 05:43

## 2018-02-18 RX ADMIN — Medication 40 MILLIEQUIVALENT(S): at 15:55

## 2018-02-18 RX ADMIN — ATENOLOL 50 MILLIGRAM(S): 25 TABLET ORAL at 05:43

## 2018-02-18 RX ADMIN — SIMVASTATIN 5 MILLIGRAM(S): 20 TABLET, FILM COATED ORAL at 21:56

## 2018-02-18 RX ADMIN — Medication 112 MICROGRAM(S): at 05:43

## 2018-02-18 RX ADMIN — DONEPEZIL HYDROCHLORIDE 10 MILLIGRAM(S): 10 TABLET, FILM COATED ORAL at 21:57

## 2018-02-18 RX ADMIN — HEPARIN SODIUM 5000 UNIT(S): 5000 INJECTION INTRAVENOUS; SUBCUTANEOUS at 05:44

## 2018-02-18 RX ADMIN — DULOXETINE HYDROCHLORIDE 30 MILLIGRAM(S): 30 CAPSULE, DELAYED RELEASE ORAL at 12:33

## 2018-02-18 RX ADMIN — Medication 40 MILLIEQUIVALENT(S): at 12:33

## 2018-02-18 RX ADMIN — Medication 200 MILLIGRAM(S): at 22:05

## 2018-02-18 RX ADMIN — INSULIN GLARGINE 7 UNIT(S): 100 INJECTION, SOLUTION SUBCUTANEOUS at 21:56

## 2018-02-18 RX ADMIN — Medication 1: at 17:43

## 2018-02-18 RX ADMIN — PRAMIPEXOLE DIHYDROCHLORIDE 0.25 MILLIGRAM(S): 0.12 TABLET ORAL at 21:55

## 2018-02-18 RX ADMIN — Medication 40 MILLIGRAM(S): at 05:44

## 2018-02-18 RX ADMIN — Medication 3: at 12:33

## 2018-02-18 NOTE — PROGRESS NOTE ADULT - PROBLEM SELECTOR PLAN 2
-per wife, patient was seen by PCP recently and told that his kidney function is slightly affected, though not sure of exact numbers.   -Cr of 1.45 on admission, BUN / Cr ratio is 20.   -f/u urine lytes  -Monitor BMP, if Cr does not improve consider renal US. -per wife, patient was seen by PCP recently and told that his kidney function is slightly affected, though not sure of exact numbers.   -Cr of 1.45 on admission, BUN / Cr ratio is 20.   -Cr improving on lasix  f/u BMP

## 2018-02-18 NOTE — PROGRESS NOTE ADULT - SUBJECTIVE AND OBJECTIVE BOX
PGY 1 Note discussed with supervising resident and primary attending    Patient is a 88y old  Male who presents with a chief complaint of dyspnea (16 Feb 2018 21:45)      INTERVAL HPI/OVERNIGHT EVENTS: no new complaints    MEDICATIONS  (STANDING):  amLODIPine   Tablet 10 milliGRAM(s) Oral daily  aspirin  chewable 81 milliGRAM(s) Oral daily  ATENolol  Tablet 50 milliGRAM(s) Oral daily  donepezil 10 milliGRAM(s) Oral at bedtime  DULoxetine 30 milliGRAM(s) Oral daily  furosemide   Injectable 40 milliGRAM(s) IV Push daily  heparin  Injectable 5000 Unit(s) SubCutaneous every 12 hours  insulin glargine Injectable (LANTUS) 7 Unit(s) SubCutaneous at bedtime  insulin lispro (HumaLOG) corrective regimen sliding scale   SubCutaneous three times a day before meals  levothyroxine 112 MICROGram(s) Oral daily  pramipexole 0.25 milliGRAM(s) Oral three times a day  pregabalin 200 milliGRAM(s) Oral at bedtime  QUEtiapine 25 milliGRAM(s) Oral daily  simvastatin 5 milliGRAM(s) Oral at bedtime    MEDICATIONS  (PRN):      __________________________________________________  REVIEW OF SYSTEMS:    CONSTITUTIONAL: No fever,   EYES: no acute visual disturbances  NECK: No pain or stiffness  RESPIRATORY: No cough; No shortness of breath  CARDIOVASCULAR: No chest pain, no palpitations  GASTROINTESTINAL: No pain. No nausea or vomiting; No diarrhea   NEUROLOGICAL: No headache or numbness, no tremors  MUSCULOSKELETAL: No joint pain, no muscle pain  GENITOURINARY: no dysuria, no frequency, no hesitancy  PSYCHIATRY: no depression , no anxiety  ALL OTHER  ROS negative        Vital Signs Last 24 Hrs  T(C): 36.6 (18 Feb 2018 15:44), Max: 36.7 (18 Feb 2018 12:02)  T(F): 97.9 (18 Feb 2018 15:44), Max: 98.1 (18 Feb 2018 12:02)  HR: 60 (18 Feb 2018 15:44) (53 - 67)  BP: 131/46 (18 Feb 2018 15:44) (130/55 - 162/62)  RR: 18 (18 Feb 2018 15:44) (16 - 18)  SpO2: 100% (18 Feb 2018 15:44) (94% - 100%)    ________________________________________________  PHYSICAL EXAM:  GENERAL: NAD  HEENT:Normocephalic;  conjunctivae and sclerae clear; moist mucous membranes;   NECK : supple  CHEST/LUNG: Clear to auscuitation bilaterally with good air entry   HEART: S1 S2  regular; no murmurs, gallops or rubs  ABDOMEN: Soft, Nontender, Nondistended; Bowel sounds present  EXTREMITIES: no cyanosis; no edema; no calf tenderness  NERVOUS SYSTEM:  Awake and alert; Oriented  to place, person and time ; no new deficits    _________________________________________________  LABS:                        10.1   6.2   )-----------( 213      ( 18 Feb 2018 07:22 )             31.8     02-18    141  |  100  |  33<H>  ----------------------------<  121<H>  2.9<LL>   |  33<H>  |  1.33<H>    Ca    8.1<L>      18 Feb 2018 07:22  Phos  3.6     02-17  Mg     2.1     02-17    TPro  8.0  /  Alb  3.2<L>  /  TBili  0.5  /  DBili  x   /  AST  32  /  ALT  28  /  AlkPhos  124<H>  02-16    PT/INR - ( 16 Feb 2018 17:13 )   PT: 12.1 sec;   INR: 1.11 ratio         PTT - ( 16 Feb 2018 17:13 )  PTT:31.7 sec    CAPILLARY BLOOD GLUCOSE      POCT Blood Glucose.: 271 mg/dL (18 Feb 2018 11:44)  POCT Blood Glucose.: 125 mg/dL (18 Feb 2018 07:37)  POCT Blood Glucose.: 181 mg/dL (17 Feb 2018 21:05)            Plan of care was discussed with patient and /or primary care giver; all questions and concerns were addressed and care was aligned with patient's wishes. PGY 1 Note discussed with supervising resident and primary attending    Patient is a 88y old  Male who presents with a chief complaint of dyspnea (16 Feb 2018 21:45)      INTERVAL HPI/OVERNIGHT EVENTS: no new complaints    MEDICATIONS  (STANDING):  amLODIPine   Tablet 10 milliGRAM(s) Oral daily  aspirin  chewable 81 milliGRAM(s) Oral daily  ATENolol  Tablet 50 milliGRAM(s) Oral daily  donepezil 10 milliGRAM(s) Oral at bedtime  DULoxetine 30 milliGRAM(s) Oral daily  furosemide   Injectable 40 milliGRAM(s) IV Push daily  heparin  Injectable 5000 Unit(s) SubCutaneous every 12 hours  insulin glargine Injectable (LANTUS) 7 Unit(s) SubCutaneous at bedtime  insulin lispro (HumaLOG) corrective regimen sliding scale   SubCutaneous three times a day before meals  levothyroxine 112 MICROGram(s) Oral daily  pramipexole 0.25 milliGRAM(s) Oral three times a day  pregabalin 200 milliGRAM(s) Oral at bedtime  QUEtiapine 25 milliGRAM(s) Oral daily  simvastatin 5 milliGRAM(s) Oral at bedtime    MEDICATIONS  (PRN):      __________________________________________________  REVIEW OF SYSTEMS:    CONSTITUTIONAL: No fever,   EYES: no acute visual disturbances  NECK: No pain or stiffness  RESPIRATORY: No cough; No shortness of breath  CARDIOVASCULAR: No chest pain, no palpitations  GASTROINTESTINAL: No pain. No nausea or vomiting; No diarrhea   NEUROLOGICAL: No headache or numbness, no tremors  MUSCULOSKELETAL: No joint pain, no muscle pain  GENITOURINARY: no dysuria, no frequency, no hesitancy  PSYCHIATRY: no depression , no anxiety  ALL OTHER  ROS negative        Vital Signs Last 24 Hrs  T(C): 36.6 (18 Feb 2018 15:44), Max: 36.7 (18 Feb 2018 12:02)  T(F): 97.9 (18 Feb 2018 15:44), Max: 98.1 (18 Feb 2018 12:02)  HR: 60 (18 Feb 2018 15:44) (53 - 67)  BP: 131/46 (18 Feb 2018 15:44) (130/55 - 162/62)  RR: 18 (18 Feb 2018 15:44) (16 - 18)  SpO2: 100% (18 Feb 2018 15:44) (94% - 100%)    ________________________________________________  PHYSICAL EXAM:  GENERAL: NAD  HEENT:Normocephalic;  conjunctivae and sclerae clear; moist mucous membranes;   NECK : supple  CHEST/LUNG: slight bibasilar crackles  HEART: S1 S2  regular; no murmurs, gallops or rubs  ABDOMEN: Soft, Nontender, Nondistended; Bowel sounds present  EXTREMITIES: no cyanosis; no edema; no calf tenderness  NERVOUS SYSTEM:  Awake and alert; Oriented  to place, person and time ; no new deficits    _________________________________________________  LABS:                        10.1   6.2   )-----------( 213      ( 18 Feb 2018 07:22 )             31.8     02-18    141  |  100  |  33<H>  ----------------------------<  121<H>  2.9<LL>   |  33<H>  |  1.33<H>    Ca    8.1<L>      18 Feb 2018 07:22  Phos  3.6     02-17  Mg     2.1     02-17    TPro  8.0  /  Alb  3.2<L>  /  TBili  0.5  /  DBili  x   /  AST  32  /  ALT  28  /  AlkPhos  124<H>  02-16    PT/INR - ( 16 Feb 2018 17:13 )   PT: 12.1 sec;   INR: 1.11 ratio         PTT - ( 16 Feb 2018 17:13 )  PTT:31.7 sec    CAPILLARY BLOOD GLUCOSE      POCT Blood Glucose.: 271 mg/dL (18 Feb 2018 11:44)  POCT Blood Glucose.: 125 mg/dL (18 Feb 2018 07:37)  POCT Blood Glucose.: 181 mg/dL (17 Feb 2018 21:05)            Plan of care was discussed with patient and /or primary care giver; all questions and concerns were addressed and care was aligned with patient's wishes.

## 2018-02-18 NOTE — PROGRESS NOTE ADULT - PROBLEM SELECTOR PLAN 1
-No known prior history as per patient's wife.   -CXR: symmetrically prominent vascular markings in mid lower lung field suggesting CHF.   -Pro BNP 5107.   -EKG: NSR with TWI in 1, aVL (no prior ekg to compare).   -Trop x 1 is negative.   -c/w IV Lasix 40 mg daily, Strict I/Os, daily weights.   -f/u ECHO.   -Cardio Dr. Florentino -No known prior history as per patient's wife.   -CXR: symmetrically prominent vascular markings in mid lower lung field suggesting CHF.   -Pro BNP 5107.   -EKG: NSR with TWI in 1, aVL (no prior ekg to compare).   -troponins negative  -c/w IV Lasix 40 mg daily, Strict I/Os, daily weights.   -echo - 69% EF with grade 2 diastolic dysfunction  -f/u Cardio Dr. Florentino

## 2018-02-18 NOTE — PROGRESS NOTE ADULT - PROBLEM SELECTOR PLAN 4
-Patient takes Insulin Toujeo 7 U qhs, Humalog 5 U tid pre meals and Glipizide 5 mg at home  -c/w Lantus 7 U qhs and HSS   -monitor accu cheks as hs and adjust insulin regimen if needed  -f/u HbA1c -Patient takes Insulin Toujeo 7 U qhs, Humalog 5 U tid pre meals and Glipizide 5 mg at home  -c/w Lantus 7 U qhs and HSS   -monitor accu cheks as hs and adjust insulin regimen if needed  -HbA1c - 7.7%

## 2018-02-18 NOTE — PROGRESS NOTE ADULT - SUBJECTIVE AND OBJECTIVE BOX
Patient is a 88y old  Male who presents with a chief complaint of dyspnea (2018 21:45)    PATIENT IS SEEN AND EXAMINED IN TELEMETRY FLOOR.    ALLERGIES:  No Known Allergies    Daily Weight in k.2 (2018 04:40)    VITALS:    Vital Signs Last 24 Hrs  T(C): 36.7 (2018 12:02), Max: 36.7 (2018 12:02)  T(F): 98.1 (2018 12:02), Max: 98.1 (2018 12:02)  HR: 55 (2018 12:02) (53 - 67)  BP: 130/55 (2018 12:02) (130/55 - 162/62)  BP(mean): --  RR: 18 (2018 12:02) (16 - 18)  SpO2: 99% (2018 12:02) (94% - 100%)    LABS:  CBC Full  -  ( 2018 07:22 )  WBC Count : 6.2 K/uL  Hemoglobin : 10.1 g/dL  Hematocrit : 31.8 %  Platelet Count - Automated : 213 K/uL  Mean Cell Volume : 100.3 fl  Mean Cell Hemoglobin : 31.8 pg  Mean Cell Hemoglobin Concentration : 31.8 gm/dL  Auto Neutrophil # : 3.7 K/uL  Auto Lymphocyte # : 1.5 K/uL  Auto Monocyte # : 0.6 K/uL  Auto Eosinophil # : 0.3 K/uL  Auto Basophil # : 0.1 K/uL  Auto Neutrophil % : 59.6 %  Auto Lymphocyte % : 23.8 %  Auto Monocyte % : 10.3 %  Auto Eosinophil % : 5.3 %  Auto Basophil % : 1.0 %    PT/INR - ( 2018 17:13 )   PT: 12.1 sec;   INR: 1.11 ratio         PTT - ( 2018 17:13 )  PTT:31.7 sec  18    141  |  100  |  33<H>  ----------------------------<  121<H>  2.9<LL>   |  33<H>  |  1.33<H>    Ca    8.1<L>      2018 07:22  Phos  3.6     02-17  Mg     2.1     02-17    TPro  8.0  /  Alb  3.2<L>  /  TBili  0.5  /  DBili  x   /  AST  32  /  ALT  28  /  AlkPhos  124<H>      CAPILLARY BLOOD GLUCOSE      POCT Blood Glucose.: 271 mg/dL (2018 11:44)  POCT Blood Glucose.: 125 mg/dL (2018 07:37)  POCT Blood Glucose.: 181 mg/dL (2018 21:05)  POCT Blood Glucose.: 172 mg/dL (2018 16:36)    CARDIAC MARKERS ( 2018 07:50 )  0.031 ng/mL / x     / 185 U/L / x     / 5.0 ng/mL  CARDIAC MARKERS ( 2018 04:03 )  0.033 ng/mL / x     / 178 U/L / x     / 4.7 ng/mL  CARDIAC MARKERS ( 2018 17:13 )  <0.015 ng/mL / x     / 171 U/L / x     / 3.5 ng/mL      LIVER FUNCTIONS - ( 2018 17:13 )  Alb: 3.2 g/dL / Pro: 8.0 g/dL / ALK PHOS: 124 U/L / ALT: 28 U/L DA / AST: 32 U/L / GGT: x                     MEDICATIONS:    MEDICATIONS  (STANDING):  amLODIPine   Tablet 10 milliGRAM(s) Oral daily  aspirin  chewable 81 milliGRAM(s) Oral daily  ATENolol  Tablet 50 milliGRAM(s) Oral daily  donepezil 10 milliGRAM(s) Oral at bedtime  DULoxetine 30 milliGRAM(s) Oral daily  furosemide   Injectable 40 milliGRAM(s) IV Push daily  heparin  Injectable 5000 Unit(s) SubCutaneous every 12 hours  insulin glargine Injectable (LANTUS) 7 Unit(s) SubCutaneous at bedtime  insulin lispro (HumaLOG) corrective regimen sliding scale   SubCutaneous three times a day before meals  levothyroxine 112 MICROGram(s) Oral daily  potassium chloride    Tablet ER 40 milliEquivalent(s) Oral every 4 hours  pramipexole 0.25 milliGRAM(s) Oral three times a day  pregabalin 200 milliGRAM(s) Oral at bedtime  QUEtiapine 25 milliGRAM(s) Oral daily  simvastatin 5 milliGRAM(s) Oral at bedtime      MEDICATIONS  (PRN):      REVIEW OF SYSTEMS:                           ALL ROS DONE [ X   ]    CONSTITUTIONAL:  LETHARGIC [   ], FEVER [   ], UNRESPONSIVE [   ]  CVS:  CP  [   ], SOB, [   ], PALPITATIONS [   ], DIZZYNESS [   ]  RS: COUGH [   ], SPUTUM [   ]  GI: ABDOMINAL PAIN [   ], NAUSEA [   ], VOMITINGS [   ], DIARRHEA [   ], CONSTIPATION [   ]  :  DYSURIA [   ], NOCTURIA [   ], INCREASED FREQUENCY [   ], DRIBLING [   ],  SKELETAL: PAINFUL JOINTS [   ], SWOLLEN JOINTS [   ], NECK ACHE [   ], LOW BACK ACHE [   ],  SKIN : ULCERS [   ], RASH [   ], ITCHING [   ]  CNS: HEAD ACHE [   ], DOUBLE VISION [   ], BLURRED VISION [   ], AMS / CONFUSION [   ], SEIZURES [   ], WEAKNESS [   ],TINGLING / NUMBNESS [   ]    PHYSICAL EXAMINATION:  GENERAL APPEARANCE: NO DISTRESS  HEENT:  NO PALLOR, NO  JVD,  NO   NODES, NECK SUPPLE  CVS: S1 +, S2 +,   RS: AEEB,  OCCASIONAL  RALES +, OCCASIONAL RONCHI +  ABD: SOFT, NT, NO, BS +  EXT: MILD PE +  SKIN: WARM,   SKELETAL:  ROM ACCEPTABLE  CNS:  AAO X  1-2  , NO  DEFICITS    RADIOLOGY :      ASSESSMENT :     Heart failure  Dementia  HTN (hypertension)  Diabetes  History of hernia surgery      PLAN:  HPI:  88 years old male from home, lives with wife, AAO x2 at baseline, ambulates with walker, PMH of HTN, DM and Dementia and PSH of Hernia surgery brought to ED by wife for shortness of breath since last night. Patient was seen by his PCP yesterday, had blood and urine tests done and was told his kidney function is slightly affected. Patient is AAO x2 currently , denies any complains and looks anxious. Per wife no other complains like chest pain, palpitations, nausea, vomiting, diarrhea, fever, chills, recent travel or sick contacts. Patient has got his flu vaccine and Pneumonia vaccine as per wife. Had a recent hospitalization i after he threatened his wife and police was called. Has a bruise on right flank from a fall past weekend. (2018 21:45)    - CHF - ACS AND ARRHYTHMIA IS RULED OUT. CONTINUE IV LASIX  - ARF / CKD - MONITOR RENAL FX  - WORSENING DEMENTIA, RECURRENT FALLS - NEEDS PLACEMENT IN A SUB ACUTE REHAB . D/W PATIENT'S SPOUSE  - GI AND DVT PROPHYLAXIS  - DR. FLORES

## 2018-02-18 NOTE — PROGRESS NOTE ADULT - SUBJECTIVE AND OBJECTIVE BOX
Subjective: Subjective: pt seen and examined, no complaints on exam.  no chest pain , SOB , palpitation on exam  ROS - .    amLODIPine   Tablet 10 milliGRAM(s) Oral daily  aspirin  chewable 81 milliGRAM(s) Oral daily  ATENolol  Tablet 50 milliGRAM(s) Oral daily  donepezil 10 milliGRAM(s) Oral at bedtime  DULoxetine 30 milliGRAM(s) Oral daily  furosemide   Injectable 40 milliGRAM(s) IV Push daily  heparin  Injectable 5000 Unit(s) SubCutaneous every 12 hours  insulin glargine Injectable (LANTUS) 7 Unit(s) SubCutaneous at bedtime  insulin lispro (HumaLOG) corrective regimen sliding scale   SubCutaneous three times a day before meals  levothyroxine 112 MICROGram(s) Oral daily  pramipexole 0.25 milliGRAM(s) Oral three times a day  pregabalin 200 milliGRAM(s) Oral at bedtime  QUEtiapine 25 milliGRAM(s) Oral daily  simvastatin 5 milliGRAM(s) Oral at bedtime                            10.5   9.0   )-----------( 196      ( 17 Feb 2018 07:50 )             32.5       Hemoglobin: 10.5 g/dL (02-17 @ 07:50)  Hemoglobin: 10.9 g/dL (02-16 @ 17:13)      02-17    139  |  101  |  30<H>  ----------------------------<  239<H>  3.6   |  26  |  1.61<H>    Ca    8.6      17 Feb 2018 07:50  Phos  3.6     02-17  Mg     2.1     02-17    TPro  8.0  /  Alb  3.2<L>  /  TBili  0.5  /  DBili  x   /  AST  32  /  ALT  28  /  AlkPhos  124<H>  02-16    Creatinine Trend: 1.61<--, 1.45<--    COAGS:     CARDIAC MARKERS ( 17 Feb 2018 07:50 )  0.031 ng/mL / x     / 185 U/L / x     / 5.0 ng/mL  CARDIAC MARKERS ( 17 Feb 2018 04:03 )  0.033 ng/mL / x     / 178 U/L / x     / 4.7 ng/mL  CARDIAC MARKERS ( 16 Feb 2018 17:13 )  <0.015 ng/mL / x     / 171 U/L / x     / 3.5 ng/mL        T(C): 36.6 (02-18-18 @ 04:40), Max: 36.8 (02-17-18 @ 12:02)  HR: 60 (02-18-18 @ 04:40) (59 - 69)  BP: 154/54 (02-18-18 @ 04:40) (145/55 - 162/62)  RR: 18 (02-18-18 @ 04:40) (16 - 18)  SpO2: 96% (02-18-18 @ 04:40) (94% - 100%)  Wt(kg): --    I&O's Summary    17 Feb 2018 07:01  -  18 Feb 2018 07:00  --------------------------------------------------------  IN: 336 mL / OUT: 900 mL / NET: -564 mL      HEENT:   Normal oral mucosa, PERRL, EOMI	  Lymphatic: No lymphadenopathy , no edema  Cardiovascular: Normal S1 S2, No JVD, No murmurs , Peripheral pulses palpable 2+ bilaterally  Respiratory: Lungs clear to auscultation, normal effort 	  Gastrointestinal:  Soft, Non-tender, + BS	    TELEMETRY: 	 Nsr      DIAGNOSTIC TESTING:  [ ] Echocardiogram:   [ ]  Catheterization:  [ ] Stress Test:    OTHER: 	    ASSESSMENT/PLAN: 	88y Male PMH mild dementia admitted with dyspnea. CXR is concerning for pulmonary edema. No echo on file. He denies palpitations, syncope, nor angina.       GI / DVT prophylaxis.   keep K>4, mag >2.0   tele stable - cont BB  cont diuresing with  lasix 40mg iv q12h  *get echo  ASA/ Statin   cardiac marker neg x 3,  Probnp  5107.  daily Weights  D/W Dr Parr Subjective: Subjective: pt seen and examined, no complaints on exam.  no chest pain , SOB , palpitation on exam  ROS - .    amLODIPine   Tablet 10 milliGRAM(s) Oral daily  aspirin  chewable 81 milliGRAM(s) Oral daily  ATENolol  Tablet 50 milliGRAM(s) Oral daily  donepezil 10 milliGRAM(s) Oral at bedtime  DULoxetine 30 milliGRAM(s) Oral daily  furosemide   Injectable 40 milliGRAM(s) IV Push daily  heparin  Injectable 5000 Unit(s) SubCutaneous every 12 hours  insulin glargine Injectable (LANTUS) 7 Unit(s) SubCutaneous at bedtime  insulin lispro (HumaLOG) corrective regimen sliding scale   SubCutaneous three times a day before meals  levothyroxine 112 MICROGram(s) Oral daily  pramipexole 0.25 milliGRAM(s) Oral three times a day  pregabalin 200 milliGRAM(s) Oral at bedtime  QUEtiapine 25 milliGRAM(s) Oral daily  simvastatin 5 milliGRAM(s) Oral at bedtime                            10.5   9.0   )-----------( 196      ( 17 Feb 2018 07:50 )             32.5       Hemoglobin: 10.5 g/dL (02-17 @ 07:50)  Hemoglobin: 10.9 g/dL (02-16 @ 17:13)      02-17    139  |  101  |  30<H>  ----------------------------<  239<H>  3.6   |  26  |  1.61<H>    Ca    8.6      17 Feb 2018 07:50  Phos  3.6     02-17  Mg     2.1     02-17    TPro  8.0  /  Alb  3.2<L>  /  TBili  0.5  /  DBili  x   /  AST  32  /  ALT  28  /  AlkPhos  124<H>  02-16    Creatinine Trend: 1.61<--, 1.45<--    COAGS:     CARDIAC MARKERS ( 17 Feb 2018 07:50 )  0.031 ng/mL / x     / 185 U/L / x     / 5.0 ng/mL  CARDIAC MARKERS ( 17 Feb 2018 04:03 )  0.033 ng/mL / x     / 178 U/L / x     / 4.7 ng/mL  CARDIAC MARKERS ( 16 Feb 2018 17:13 )  <0.015 ng/mL / x     / 171 U/L / x     / 3.5 ng/mL        T(C): 36.6 (02-18-18 @ 04:40), Max: 36.8 (02-17-18 @ 12:02)  HR: 60 (02-18-18 @ 04:40) (59 - 69)  BP: 154/54 (02-18-18 @ 04:40) (145/55 - 162/62)  RR: 18 (02-18-18 @ 04:40) (16 - 18)  SpO2: 96% (02-18-18 @ 04:40) (94% - 100%)  Wt(kg): --    I&O's Summary    17 Feb 2018 07:01  -  18 Feb 2018 07:00  --------------------------------------------------------  IN: 336 mL / OUT: 900 mL / NET: -564 mL      HEENT:   Normal oral mucosa, PERRL, EOMI	  Lymphatic: No lymphadenopathy , no edema  Cardiovascular: Normal S1 S2, No JVD, No murmurs , Peripheral pulses palpable 2+ bilaterally  Respiratory: Lungs clear to auscultation, normal effort 	  Gastrointestinal:  Soft, Non-tender, + BS	    TELEMETRY: 	 Nsr      DIAGNOSTIC TESTING:  [ ] Echocardiogram:   [ ]  Catheterization:  [ ] Stress Test:    OTHER: 	    ASSESSMENT/PLAN: 	88y Male PMH mild dementia admitted with dyspnea. CXR is concerning for pulmonary edema. No echo on file. He denies palpitations, syncope, nor angina.       GI / DVT prophylaxis.   keep K>4, mag >2.0   tele stable - cont BB  change to po lasix  ASA/ Statin   cardiac marker neg x 3,  Probnp  5107.  daily Weights  D/W Dr Parr

## 2018-02-19 LAB
ANION GAP SERPL CALC-SCNC: 7 MMOL/L — SIGNIFICANT CHANGE UP (ref 5–17)
BASOPHILS # BLD AUTO: 0 K/UL — SIGNIFICANT CHANGE UP (ref 0–0.2)
BASOPHILS NFR BLD AUTO: 0.6 % — SIGNIFICANT CHANGE UP (ref 0–2)
BUN SERPL-MCNC: 36 MG/DL — HIGH (ref 7–18)
CALCIUM SERPL-MCNC: 8.1 MG/DL — LOW (ref 8.4–10.5)
CHLORIDE SERPL-SCNC: 101 MMOL/L — SIGNIFICANT CHANGE UP (ref 96–108)
CO2 SERPL-SCNC: 33 MMOL/L — HIGH (ref 22–31)
CREAT SERPL-MCNC: 1.35 MG/DL — HIGH (ref 0.5–1.3)
EOSINOPHIL # BLD AUTO: 0.5 K/UL — SIGNIFICANT CHANGE UP (ref 0–0.5)
EOSINOPHIL NFR BLD AUTO: 8.1 % — HIGH (ref 0–6)
GLUCOSE BLDC GLUCOMTR-MCNC: 111 MG/DL — HIGH (ref 70–99)
GLUCOSE BLDC GLUCOMTR-MCNC: 136 MG/DL — HIGH (ref 70–99)
GLUCOSE BLDC GLUCOMTR-MCNC: 206 MG/DL — HIGH (ref 70–99)
GLUCOSE BLDC GLUCOMTR-MCNC: 234 MG/DL — HIGH (ref 70–99)
GLUCOSE BLDC GLUCOMTR-MCNC: 238 MG/DL — HIGH (ref 70–99)
GLUCOSE SERPL-MCNC: 93 MG/DL — SIGNIFICANT CHANGE UP (ref 70–99)
HCT VFR BLD CALC: 36.8 % — LOW (ref 39–50)
HGB BLD-MCNC: 11.6 G/DL — LOW (ref 13–17)
LYMPHOCYTES # BLD AUTO: 1.2 K/UL — SIGNIFICANT CHANGE UP (ref 1–3.3)
LYMPHOCYTES # BLD AUTO: 17.7 % — SIGNIFICANT CHANGE UP (ref 13–44)
MAGNESIUM SERPL-MCNC: 2.1 MG/DL — SIGNIFICANT CHANGE UP (ref 1.6–2.6)
MCHC RBC-ENTMCNC: 31.6 GM/DL — LOW (ref 32–36)
MCHC RBC-ENTMCNC: 31.7 PG — SIGNIFICANT CHANGE UP (ref 27–34)
MCV RBC AUTO: 100.4 FL — HIGH (ref 80–100)
MONOCYTES # BLD AUTO: 0.7 K/UL — SIGNIFICANT CHANGE UP (ref 0–0.9)
MONOCYTES NFR BLD AUTO: 9.7 % — SIGNIFICANT CHANGE UP (ref 2–14)
NEUTROPHILS # BLD AUTO: 4.3 K/UL — SIGNIFICANT CHANGE UP (ref 1.8–7.4)
NEUTROPHILS NFR BLD AUTO: 63.9 % — SIGNIFICANT CHANGE UP (ref 43–77)
PHOSPHATE SERPL-MCNC: 4.2 MG/DL — SIGNIFICANT CHANGE UP (ref 2.5–4.5)
PLATELET # BLD AUTO: 255 K/UL — SIGNIFICANT CHANGE UP (ref 150–400)
POTASSIUM SERPL-MCNC: 3.7 MMOL/L — SIGNIFICANT CHANGE UP (ref 3.5–5.3)
POTASSIUM SERPL-SCNC: 3.7 MMOL/L — SIGNIFICANT CHANGE UP (ref 3.5–5.3)
RBC # BLD: 3.66 M/UL — LOW (ref 4.2–5.8)
RBC # FLD: 13.4 % — SIGNIFICANT CHANGE UP (ref 10.3–14.5)
SODIUM SERPL-SCNC: 141 MMOL/L — SIGNIFICANT CHANGE UP (ref 135–145)
WBC # BLD: 6.8 K/UL — SIGNIFICANT CHANGE UP (ref 3.8–10.5)
WBC # FLD AUTO: 6.8 K/UL — SIGNIFICANT CHANGE UP (ref 3.8–10.5)

## 2018-02-19 RX ADMIN — Medication 112 MICROGRAM(S): at 05:13

## 2018-02-19 RX ADMIN — DULOXETINE HYDROCHLORIDE 30 MILLIGRAM(S): 30 CAPSULE, DELAYED RELEASE ORAL at 11:56

## 2018-02-19 RX ADMIN — AMLODIPINE BESYLATE 10 MILLIGRAM(S): 2.5 TABLET ORAL at 05:12

## 2018-02-19 RX ADMIN — INSULIN GLARGINE 7 UNIT(S): 100 INJECTION, SOLUTION SUBCUTANEOUS at 22:05

## 2018-02-19 RX ADMIN — Medication 81 MILLIGRAM(S): at 11:56

## 2018-02-19 RX ADMIN — PRAMIPEXOLE DIHYDROCHLORIDE 0.25 MILLIGRAM(S): 0.12 TABLET ORAL at 05:12

## 2018-02-19 RX ADMIN — HEPARIN SODIUM 5000 UNIT(S): 5000 INJECTION INTRAVENOUS; SUBCUTANEOUS at 05:14

## 2018-02-19 RX ADMIN — Medication 2: at 11:55

## 2018-02-19 RX ADMIN — QUETIAPINE FUMARATE 25 MILLIGRAM(S): 200 TABLET, FILM COATED ORAL at 11:56

## 2018-02-19 RX ADMIN — PRAMIPEXOLE DIHYDROCHLORIDE 0.25 MILLIGRAM(S): 0.12 TABLET ORAL at 13:18

## 2018-02-19 RX ADMIN — Medication 40 MILLIGRAM(S): at 05:12

## 2018-02-19 RX ADMIN — Medication 200 MILLIGRAM(S): at 22:05

## 2018-02-19 RX ADMIN — PRAMIPEXOLE DIHYDROCHLORIDE 0.25 MILLIGRAM(S): 0.12 TABLET ORAL at 22:05

## 2018-02-19 RX ADMIN — ERGOCALCIFEROL 50000 UNIT(S): 1.25 CAPSULE ORAL at 11:56

## 2018-02-19 RX ADMIN — HEPARIN SODIUM 5000 UNIT(S): 5000 INJECTION INTRAVENOUS; SUBCUTANEOUS at 18:15

## 2018-02-19 RX ADMIN — ATENOLOL 50 MILLIGRAM(S): 25 TABLET ORAL at 05:13

## 2018-02-19 RX ADMIN — SIMVASTATIN 5 MILLIGRAM(S): 20 TABLET, FILM COATED ORAL at 22:05

## 2018-02-19 RX ADMIN — DONEPEZIL HYDROCHLORIDE 10 MILLIGRAM(S): 10 TABLET, FILM COATED ORAL at 22:05

## 2018-02-19 NOTE — PROGRESS NOTE ADULT - PROBLEM SELECTOR PLAN 1
-No known prior history as per patient's wife.   -CXR: symmetrically prominent vascular markings in mid lower lung field suggesting CHF.   -Pro BNP 5107.   -EKG: NSR with TWI in 1, aVL (no prior ekg to compare).   -troponins negative  -c/w PO Lasix 40 mg daily, Strict I/Os, daily weights.   -echo - 69% EF with grade 2 diastolic dysfunction  -f/u Cardio Dr. Florentino

## 2018-02-19 NOTE — PROGRESS NOTE ADULT - SUBJECTIVE AND OBJECTIVE BOX
PGY 1 Note discussed with supervising resident and primary attending    Patient is a 88y old  Male who presents with a chief complaint of dyspnea (16 Feb 2018 21:45)      INTERVAL HPI/OVERNIGHT EVENTS: no new complaints    MEDICATIONS  (STANDING):  amLODIPine   Tablet 10 milliGRAM(s) Oral daily  aspirin  chewable 81 milliGRAM(s) Oral daily  ATENolol  Tablet 50 milliGRAM(s) Oral daily  donepezil 10 milliGRAM(s) Oral at bedtime  DULoxetine 30 milliGRAM(s) Oral daily  ergocalciferol 80407 Unit(s) Oral <User Schedule>  furosemide    Tablet 40 milliGRAM(s) Oral daily  heparin  Injectable 5000 Unit(s) SubCutaneous every 12 hours  insulin glargine Injectable (LANTUS) 7 Unit(s) SubCutaneous at bedtime  insulin lispro (HumaLOG) corrective regimen sliding scale   SubCutaneous three times a day before meals  levothyroxine 112 MICROGram(s) Oral daily  pramipexole 0.25 milliGRAM(s) Oral three times a day  pregabalin 200 milliGRAM(s) Oral at bedtime  QUEtiapine 25 milliGRAM(s) Oral daily  simvastatin 5 milliGRAM(s) Oral at bedtime    MEDICATIONS  (PRN):      __________________________________________________  REVIEW OF SYSTEMS:    CONSTITUTIONAL: No fever,   EYES: no acute visual disturbances  NECK: No pain or stiffness  RESPIRATORY: No cough; No shortness of breath  CARDIOVASCULAR: No chest pain, no palpitations  GASTROINTESTINAL: No pain. No nausea or vomiting; No diarrhea   NEUROLOGICAL: No headache or numbness, no tremors  MUSCULOSKELETAL: No joint pain, no muscle pain  GENITOURINARY: no dysuria, no frequency, no hesitancy  PSYCHIATRY: no depression , no anxiety  ALL OTHER  ROS negative        Vital Signs Last 24 Hrs  T(C): 36.4 (19 Feb 2018 07:35), Max: 36.7 (18 Feb 2018 12:02)  T(F): 97.6 (19 Feb 2018 07:35), Max: 98.1 (18 Feb 2018 12:02)  HR: 57 (19 Feb 2018 07:35) (55 - 60)  BP: 171/58 (19 Feb 2018 07:35) (130/55 - 171/58)  RR: 18 (19 Feb 2018 07:35) (18 - 18)  SpO2: 100% (19 Feb 2018 07:35) (99% - 100%)    ________________________________________________  PHYSICAL EXAM:  GENERAL: NAD  HEENT:Normocephalic;  conjunctivae and sclerae clear  NECK : supple  CHEST/LUNG: Clear to auscuitation bilaterally with good air entry   HEART: S1 S2  regular; no murmurs, gallops or rubs  ABDOMEN: Soft, Nontender, Nondistended; Bowel sounds present  EXTREMITIES: no cyanosis; no edema; no calf tenderness  NERVOUS SYSTEM:  Awake and alert; Oriented  to place, person and time     _________________________________________________  LABS:                        10.1   6.2   )-----------( 213      ( 18 Feb 2018 07:22 )             31.8     02-19    x   |  x   |  x   ----------------------------<  x   x    |  x   |  1.35<H>    Ca    8.1<L>      18 Feb 2018 07:22  Phos  4.2     02-19  Mg     2.1     02-19          CAPILLARY BLOOD GLUCOSE      POCT Blood Glucose.: 111 mg/dL (19 Feb 2018 07:41)  POCT Blood Glucose.: 254 mg/dL (18 Feb 2018 21:27)  POCT Blood Glucose.: 160 mg/dL (18 Feb 2018 17:02)  POCT Blood Glucose.: 271 mg/dL (18 Feb 2018 11:44)          Consultant(s) Notes Reviewed:   YES        Plan of care was discussed with patient and /or primary care giver; all questions and concerns were addressed and care was aligned with patient's wishes.

## 2018-02-19 NOTE — PROGRESS NOTE ADULT - PROBLEM SELECTOR PLAN 4
-Patient takes Insulin Toujeo 7 U qhs, Humalog 5 U tid pre meals and Glipizide 5 mg at home  -c/w Lantus 7 U qhs and HSS   -monitor accu cheks as hs and adjust insulin regimen if needed  -HbA1c - 7.7%

## 2018-02-19 NOTE — PROGRESS NOTE ADULT - PROBLEM SELECTOR PLAN 2
-per wife, patient was seen by PCP recently and told that his kidney function is slightly affected, though not sure of exact numbers.   -Cr of 1.45 on admission, BUN / Cr ratio is 20.   -Cr improving on lasix  f/u BMP

## 2018-02-20 DIAGNOSIS — R55 SYNCOPE AND COLLAPSE: ICD-10-CM

## 2018-02-20 LAB
ANION GAP SERPL CALC-SCNC: 7 MMOL/L — SIGNIFICANT CHANGE UP (ref 5–17)
BUN SERPL-MCNC: 39 MG/DL — HIGH (ref 7–18)
CALCIUM SERPL-MCNC: 8.2 MG/DL — LOW (ref 8.4–10.5)
CHLORIDE SERPL-SCNC: 100 MMOL/L — SIGNIFICANT CHANGE UP (ref 96–108)
CO2 SERPL-SCNC: 32 MMOL/L — HIGH (ref 22–31)
CREAT SERPL-MCNC: 1.57 MG/DL — HIGH (ref 0.5–1.3)
GLUCOSE BLDC GLUCOMTR-MCNC: 131 MG/DL — HIGH (ref 70–99)
GLUCOSE BLDC GLUCOMTR-MCNC: 134 MG/DL — HIGH (ref 70–99)
GLUCOSE BLDC GLUCOMTR-MCNC: 166 MG/DL — HIGH (ref 70–99)
GLUCOSE BLDC GLUCOMTR-MCNC: 287 MG/DL — HIGH (ref 70–99)
GLUCOSE BLDC GLUCOMTR-MCNC: 303 MG/DL — HIGH (ref 70–99)
GLUCOSE SERPL-MCNC: 150 MG/DL — HIGH (ref 70–99)
MAGNESIUM SERPL-MCNC: 1.9 MG/DL — SIGNIFICANT CHANGE UP (ref 1.6–2.6)
PHOSPHATE SERPL-MCNC: 3.3 MG/DL — SIGNIFICANT CHANGE UP (ref 2.5–4.5)
POTASSIUM SERPL-MCNC: 3.3 MMOL/L — LOW (ref 3.5–5.3)
POTASSIUM SERPL-SCNC: 3.3 MMOL/L — LOW (ref 3.5–5.3)
SODIUM SERPL-SCNC: 139 MMOL/L — SIGNIFICANT CHANGE UP (ref 135–145)

## 2018-02-20 PROCEDURE — 71045 X-RAY EXAM CHEST 1 VIEW: CPT | Mod: 26

## 2018-02-20 RX ORDER — FUROSEMIDE 40 MG
20 TABLET ORAL DAILY
Qty: 0 | Refills: 0 | Status: DISCONTINUED | OUTPATIENT
Start: 2018-02-21 | End: 2018-02-21

## 2018-02-20 RX ORDER — MAGNESIUM HYDROXIDE 400 MG/1
30 TABLET, CHEWABLE ORAL ONCE
Qty: 0 | Refills: 0 | Status: COMPLETED | OUTPATIENT
Start: 2018-02-20 | End: 2018-02-20

## 2018-02-20 RX ORDER — POTASSIUM CHLORIDE 20 MEQ
20 PACKET (EA) ORAL
Qty: 0 | Refills: 0 | Status: COMPLETED | OUTPATIENT
Start: 2018-02-20 | End: 2018-02-20

## 2018-02-20 RX ADMIN — Medication 20 MILLIEQUIVALENT(S): at 19:39

## 2018-02-20 RX ADMIN — PRAMIPEXOLE DIHYDROCHLORIDE 0.25 MILLIGRAM(S): 0.12 TABLET ORAL at 14:10

## 2018-02-20 RX ADMIN — INSULIN GLARGINE 7 UNIT(S): 100 INJECTION, SOLUTION SUBCUTANEOUS at 21:30

## 2018-02-20 RX ADMIN — Medication 4: at 12:09

## 2018-02-20 RX ADMIN — SIMVASTATIN 5 MILLIGRAM(S): 20 TABLET, FILM COATED ORAL at 21:31

## 2018-02-20 RX ADMIN — Medication 1: at 08:34

## 2018-02-20 RX ADMIN — Medication 20 MILLIEQUIVALENT(S): at 17:03

## 2018-02-20 RX ADMIN — Medication 112 MICROGRAM(S): at 05:45

## 2018-02-20 RX ADMIN — Medication 40 MILLIGRAM(S): at 05:45

## 2018-02-20 RX ADMIN — ATENOLOL 50 MILLIGRAM(S): 25 TABLET ORAL at 05:45

## 2018-02-20 RX ADMIN — DONEPEZIL HYDROCHLORIDE 10 MILLIGRAM(S): 10 TABLET, FILM COATED ORAL at 21:30

## 2018-02-20 RX ADMIN — QUETIAPINE FUMARATE 25 MILLIGRAM(S): 200 TABLET, FILM COATED ORAL at 12:09

## 2018-02-20 RX ADMIN — HEPARIN SODIUM 5000 UNIT(S): 5000 INJECTION INTRAVENOUS; SUBCUTANEOUS at 17:03

## 2018-02-20 RX ADMIN — MAGNESIUM HYDROXIDE 30 MILLILITER(S): 400 TABLET, CHEWABLE ORAL at 17:04

## 2018-02-20 RX ADMIN — Medication 81 MILLIGRAM(S): at 12:09

## 2018-02-20 RX ADMIN — PRAMIPEXOLE DIHYDROCHLORIDE 0.25 MILLIGRAM(S): 0.12 TABLET ORAL at 05:45

## 2018-02-20 RX ADMIN — DULOXETINE HYDROCHLORIDE 30 MILLIGRAM(S): 30 CAPSULE, DELAYED RELEASE ORAL at 12:09

## 2018-02-20 RX ADMIN — HEPARIN SODIUM 5000 UNIT(S): 5000 INJECTION INTRAVENOUS; SUBCUTANEOUS at 05:45

## 2018-02-20 RX ADMIN — Medication 20 MILLIEQUIVALENT(S): at 21:30

## 2018-02-20 RX ADMIN — AMLODIPINE BESYLATE 10 MILLIGRAM(S): 2.5 TABLET ORAL at 05:45

## 2018-02-20 RX ADMIN — Medication 200 MILLIGRAM(S): at 21:36

## 2018-02-20 RX ADMIN — PRAMIPEXOLE DIHYDROCHLORIDE 0.25 MILLIGRAM(S): 0.12 TABLET ORAL at 21:31

## 2018-02-20 NOTE — PROGRESS NOTE ADULT - PROBLEM SELECTOR PLAN 3
-c/w home meds Amlodipine 10 and Atenolol 50, monitor BP.
-c/w home meds Amlodipine 10 and Atenolol 50, monitor BP.
-per wife, patient was seen by PCP recently and told that his kidney function is slightly affected, though not sure of exact numbers.   -Cr of 1.45 on admission, BUN / Cr ratio is 20.   -Cr improving on lasix, however increased since yesterday  f/u BMP

## 2018-02-20 NOTE — PROGRESS NOTE ADULT - PROBLEM SELECTOR PROBLEM 1
Acute congestive heart failure, unspecified congestive heart failure type
Syncope
Acute congestive heart failure, unspecified congestive heart failure type

## 2018-02-20 NOTE — PROGRESS NOTE ADULT - SUBJECTIVE AND OBJECTIVE BOX
PGY 1 Note discussed with supervising resident and primary attending    Patient is a 88y old  Male who presents with a chief complaint of dyspnea (16 Feb 2018 21:45)      INTERVAL HPI/OVERNIGHT EVENTS: no new complaints; pt later had syncopal episode     MEDICATIONS  (STANDING):  amLODIPine   Tablet 10 milliGRAM(s) Oral daily  aspirin  chewable 81 milliGRAM(s) Oral daily  ATENolol  Tablet 50 milliGRAM(s) Oral daily  donepezil 10 milliGRAM(s) Oral at bedtime  DULoxetine 30 milliGRAM(s) Oral daily  ergocalciferol 03143 Unit(s) Oral <User Schedule>  furosemide    Tablet 40 milliGRAM(s) Oral daily  heparin  Injectable 5000 Unit(s) SubCutaneous every 12 hours  insulin glargine Injectable (LANTUS) 7 Unit(s) SubCutaneous at bedtime  insulin lispro (HumaLOG) corrective regimen sliding scale   SubCutaneous three times a day before meals  levothyroxine 112 MICROGram(s) Oral daily  potassium chloride    Tablet ER 20 milliEquivalent(s) Oral every 2 hours  pramipexole 0.25 milliGRAM(s) Oral three times a day  pregabalin 200 milliGRAM(s) Oral at bedtime  QUEtiapine 25 milliGRAM(s) Oral daily  simvastatin 5 milliGRAM(s) Oral at bedtime    MEDICATIONS  (PRN):      __________________________________________________  REVIEW OF SYSTEMS:    CONSTITUTIONAL: No fever,   EYES: no acute visual disturbances  NECK: No pain or stiffness  RESPIRATORY: No cough; No shortness of breath  CARDIOVASCULAR: No chest pain, no palpitations  GASTROINTESTINAL: No pain. No nausea or vomiting; No diarrhea   NEUROLOGICAL: No headache or numbness, no tremors  MUSCULOSKELETAL: No joint pain, no muscle pain  GENITOURINARY: no dysuria, no frequency, no hesitancy  PSYCHIATRY: no depression , no anxiety  ALL OTHER  ROS negative        Vital Signs Last 24 Hrs  T(C): 36.9 (20 Feb 2018 11:05), Max: 36.9 (20 Feb 2018 11:05)  T(F): 98.4 (20 Feb 2018 11:05), Max: 98.4 (20 Feb 2018 11:05)  HR: 57 (20 Feb 2018 11:05) (56 - 87)  BP: 123/61 (20 Feb 2018 11:05) (123/61 - 168/50)  RR: 18 (20 Feb 2018 11:05) (17 - 18)  SpO2: 98% (20 Feb 2018 11:05) (92% - 98%)    ________________________________________________  PHYSICAL EXAM:  GENERAL: NAD  HEENT:Normocephalic;  conjunctivae and sclerae clear; moist mucous membranes;   NECK : supple  CHEST/LUNG: Clear to auscuitation bilaterally with good air entry   HEART: S1 S2  regular; no murmurs, gallops or rubs  ABDOMEN: Soft, Nontender, Nondistended; Bowel sounds present  EXTREMITIES: no cyanosis; no edema; no calf tenderness  NERVOUS SYSTEM:  Awake and alert; Oriented  to place, person and time ; no new deficits    _________________________________________________  LABS:                        11.6   6.8   )-----------( 255      ( 19 Feb 2018 07:07 )             36.8     02-20    139  |  100  |  39<H>  ----------------------------<  150<H>  3.3<L>   |  32<H>  |  1.57<H>    Ca    8.2<L>      20 Feb 2018 07:59  Phos  3.3     02-20  Mg     1.9     02-20          CAPILLARY BLOOD GLUCOSE      POCT Blood Glucose.: 131 mg/dL (20 Feb 2018 15:06)  POCT Blood Glucose.: 303 mg/dL (20 Feb 2018 11:19)  POCT Blood Glucose.: 166 mg/dL (20 Feb 2018 07:42)  POCT Blood Glucose.: 234 mg/dL (19 Feb 2018 22:00)  POCT Blood Glucose.: 238 mg/dL (19 Feb 2018 20:40)  POCT Blood Glucose.: 136 mg/dL (19 Feb 2018 16:31)          Consultant(s) Notes Reviewed:   YES    Care Discussed with Consultants : Dr. Florentino    Plan of care was discussed with patient and /or primary care giver; all questions and concerns were addressed and care was aligned with patient's wishes.

## 2018-02-20 NOTE — PROGRESS NOTE ADULT - ASSESSMENT
88 years old male from home, lives with wife, AAO x2 at baseline, ambulates with walker, PMH of HTN, DM and Dementia and PSH of Hernia surgery brought to ED by wife for shortness of breath since last night. Admitted for new onset CHF.

## 2018-02-20 NOTE — PROGRESS NOTE ADULT - PROBLEM SELECTOR PLAN 5
-Patient takes Insulin Toujeo 7 U qhs, Humalog 5 U tid pre meals and Glipizide 5 mg at home  -c/w Lantus 7 U qhs and HSS   -monitor accu cheks as hs and adjust insulin regimen if needed  -HbA1c - 7.7%
-c/w Donepezil  -fall precautions  -supportive care
-c/w Donepezil  -fall precautions  -supportive care

## 2018-02-20 NOTE — PROGRESS NOTE ADULT - PROBLEM SELECTOR PLAN 7
-Improved VTE score of 2 (age and immobilization)  -Heparin sq for DVT ppx
-Discussed with HCP (wife: Albina Estrella [phone]) over the phone. She wants to speak to son and make the decision.   -FULL CODE for now.
-Discussed with HCP (wife: Albina Estrella [phone]) over the phone. She wants to speak to son and make the decision.   -FULL CODE for now.

## 2018-02-20 NOTE — PROGRESS NOTE ADULT - PROBLEM SELECTOR PLAN 8
-Discussed with HCP (wife: Albina Estrella [phone]) over the phone. She wants to speak to son and make the decision.   -FULL CODE for now.

## 2018-02-20 NOTE — PROGRESS NOTE ADULT - SUBJECTIVE AND OBJECTIVE BOX
Patient denies CP, SOB ROS (-)    amLODIPine   Tablet 10 milliGRAM(s) Oral daily  aspirin  chewable 81 milliGRAM(s) Oral daily  ATENolol  Tablet 50 milliGRAM(s) Oral daily  donepezil 10 milliGRAM(s) Oral at bedtime  DULoxetine 30 milliGRAM(s) Oral daily  ergocalciferol 28498 Unit(s) Oral <User Schedule>  furosemide    Tablet 40 milliGRAM(s) Oral daily  heparin  Injectable 5000 Unit(s) SubCutaneous every 12 hours  insulin glargine Injectable (LANTUS) 7 Unit(s) SubCutaneous at bedtime  insulin lispro (HumaLOG) corrective regimen sliding scale   SubCutaneous three times a day before meals  levothyroxine 112 MICROGram(s) Oral daily  pramipexole 0.25 milliGRAM(s) Oral three times a day  pregabalin 200 milliGRAM(s) Oral at bedtime  QUEtiapine 25 milliGRAM(s) Oral daily  simvastatin 5 milliGRAM(s) Oral at bedtime                            11.6   6.8   )-----------( 255      ( 19 Feb 2018 07:07 )             36.8       Hemoglobin: 11.6 g/dL (02-19 @ 07:07)  Hemoglobin: 10.1 g/dL (02-18 @ 07:22)  Hemoglobin: 10.5 g/dL (02-17 @ 07:50)  Hemoglobin: 10.9 g/dL (02-16 @ 17:13)      02-20    139  |  100  |  39<H>  ----------------------------<  150<H>  3.3<L>   |  32<H>  |  1.57<H>    Ca    8.2<L>      20 Feb 2018 07:59  Phos  3.3     02-20  Mg     1.9     02-20      Creatinine Trend: 1.57<--, 1.35<--, 1.33<--, 1.61<--, 1.45<--    COAGS:           T(C): 36.8 (02-20-18 @ 07:35), Max: 36.8 (02-19-18 @ 19:30)  HR: 56 (02-20-18 @ 07:35) (56 - 87)  BP: 158/48 (02-20-18 @ 07:35) (108/59 - 168/50)  RR: 18 (02-20-18 @ 07:35) (17 - 18)  SpO2: 98% (02-20-18 @ 07:35) (92% - 100%)  Wt(kg): --    I&O's Summary    19 Feb 2018 07:01  -  20 Feb 2018 07:00  --------------------------------------------------------  IN: 700 mL / OUT: 1000 mL / NET: -300 mL    20 Feb 2018 07:01  -  20 Feb 2018 11:01  --------------------------------------------------------  IN: 200 mL / OUT: 0 mL / NET: 200 mL            HEENT:   Normal oral mucosa, PERRL, EOMI	  Lymphatic: No lymphadenopathy , no edema  Cardiovascular: Normal S1 S2, No JVD, No murmurs , Peripheral pulses palpable 2+ bilaterally  Respiratory: Lungs clear to auscultation, normal effort 	  Gastrointestinal:  Soft, Non-tender, + BS	    TELEMETRY: 	 Nsr          ASSESSMENT/PLAN: 	88y Male PMH mild dementia admitted with dyspnea. CXR is concerning for pulmonary edema. No echo on file. He denies palpitations, syncope, nor angina.      -  D/C tele  - Tolerating PO lasix  - D/C planning per med  - No need for further cardiac w/u    Michael Florentino MD, FACC  [facility], [facility]  [address] [place] [address]  PHONE:  [phone]  BEEPER : [phone]

## 2018-02-20 NOTE — PROGRESS NOTE ADULT - PROBLEM SELECTOR PROBLEM 2
DENNIS (acute kidney injury)
Acute congestive heart failure, unspecified congestive heart failure type
DENNIS (acute kidney injury)

## 2018-02-20 NOTE — PROGRESS NOTE ADULT - PROBLEM SELECTOR PLAN 1
pt had syncopal episode prior to discharge  rapid response was called  orthostatics negative  mild wheeze on exam, will f/u CXR  c/w telemetry  notified Cardiology Dr. Florentino

## 2018-02-20 NOTE — CHART NOTE - NSCHARTNOTEFT_GEN_A_CORE
RRT called by primary team for presyncopal/transient syncopal episode. Pt apparently was being moved from chair to bed and lost momentary consciousness, but regained it within seconds. Pt denies preceding symptomology and ROS only positive for mild dizziness prior to episode. Pt exam significant for capillary refill of 3-4 seconds and mild expiratory wheeze. Pt at baseline mentation and without neurologic focal deficits on exam. Pt placed on monitor and was in sinus rhythm. Will obtain orthostatics, CXR for abnormal breath sounds. Will encourage PO hydration  Medical Attending made aware

## 2018-02-21 ENCOUNTER — TRANSCRIPTION ENCOUNTER (OUTPATIENT)
Age: 83
End: 2018-02-21

## 2018-02-21 VITALS
TEMPERATURE: 99 F | SYSTOLIC BLOOD PRESSURE: 132 MMHG | DIASTOLIC BLOOD PRESSURE: 42 MMHG | HEART RATE: 76 BPM | RESPIRATION RATE: 18 BRPM | OXYGEN SATURATION: 98 %

## 2018-02-21 LAB
ANION GAP SERPL CALC-SCNC: 9 MMOL/L — SIGNIFICANT CHANGE UP (ref 5–17)
BUN SERPL-MCNC: 44 MG/DL — HIGH (ref 7–18)
CALCIUM SERPL-MCNC: 8.5 MG/DL — SIGNIFICANT CHANGE UP (ref 8.4–10.5)
CHLORIDE SERPL-SCNC: 101 MMOL/L — SIGNIFICANT CHANGE UP (ref 96–108)
CO2 SERPL-SCNC: 29 MMOL/L — SIGNIFICANT CHANGE UP (ref 22–31)
CREAT SERPL-MCNC: 1.6 MG/DL — HIGH (ref 0.5–1.3)
GLUCOSE BLDC GLUCOMTR-MCNC: 140 MG/DL — HIGH (ref 70–99)
GLUCOSE BLDC GLUCOMTR-MCNC: 385 MG/DL — HIGH (ref 70–99)
GLUCOSE SERPL-MCNC: 148 MG/DL — HIGH (ref 70–99)
HCT VFR BLD CALC: 34.6 % — LOW (ref 39–50)
HGB BLD-MCNC: 11.3 G/DL — LOW (ref 13–17)
MAGNESIUM SERPL-MCNC: 2.1 MG/DL — SIGNIFICANT CHANGE UP (ref 1.6–2.6)
MCHC RBC-ENTMCNC: 32.2 PG — SIGNIFICANT CHANGE UP (ref 27–34)
MCHC RBC-ENTMCNC: 32.6 GM/DL — SIGNIFICANT CHANGE UP (ref 32–36)
MCV RBC AUTO: 98.8 FL — SIGNIFICANT CHANGE UP (ref 80–100)
PHOSPHATE SERPL-MCNC: 3.5 MG/DL — SIGNIFICANT CHANGE UP (ref 2.5–4.5)
PLATELET # BLD AUTO: 307 K/UL — SIGNIFICANT CHANGE UP (ref 150–400)
POTASSIUM SERPL-MCNC: 4.2 MMOL/L — SIGNIFICANT CHANGE UP (ref 3.5–5.3)
POTASSIUM SERPL-SCNC: 4.2 MMOL/L — SIGNIFICANT CHANGE UP (ref 3.5–5.3)
RBC # BLD: 3.51 M/UL — LOW (ref 4.2–5.8)
RBC # FLD: 13 % — SIGNIFICANT CHANGE UP (ref 10.3–14.5)
SODIUM SERPL-SCNC: 139 MMOL/L — SIGNIFICANT CHANGE UP (ref 135–145)
WBC # BLD: 8.2 K/UL — SIGNIFICANT CHANGE UP (ref 3.8–10.5)
WBC # FLD AUTO: 8.2 K/UL — SIGNIFICANT CHANGE UP (ref 3.8–10.5)

## 2018-02-21 PROCEDURE — 85610 PROTHROMBIN TIME: CPT

## 2018-02-21 PROCEDURE — 85730 THROMBOPLASTIN TIME PARTIAL: CPT

## 2018-02-21 PROCEDURE — 82306 VITAMIN D 25 HYDROXY: CPT

## 2018-02-21 PROCEDURE — 82746 ASSAY OF FOLIC ACID SERUM: CPT

## 2018-02-21 PROCEDURE — 84100 ASSAY OF PHOSPHORUS: CPT

## 2018-02-21 PROCEDURE — 80061 LIPID PANEL: CPT

## 2018-02-21 PROCEDURE — 82550 ASSAY OF CK (CPK): CPT

## 2018-02-21 PROCEDURE — 71045 X-RAY EXAM CHEST 1 VIEW: CPT

## 2018-02-21 PROCEDURE — 97162 PT EVAL MOD COMPLEX 30 MIN: CPT

## 2018-02-21 PROCEDURE — 82607 VITAMIN B-12: CPT

## 2018-02-21 PROCEDURE — 93306 TTE W/DOPPLER COMPLETE: CPT

## 2018-02-21 PROCEDURE — 83735 ASSAY OF MAGNESIUM: CPT

## 2018-02-21 PROCEDURE — 94640 AIRWAY INHALATION TREATMENT: CPT

## 2018-02-21 PROCEDURE — 85027 COMPLETE CBC AUTOMATED: CPT

## 2018-02-21 PROCEDURE — 80053 COMPREHEN METABOLIC PANEL: CPT

## 2018-02-21 PROCEDURE — 83880 ASSAY OF NATRIURETIC PEPTIDE: CPT

## 2018-02-21 PROCEDURE — 82553 CREATINE MB FRACTION: CPT

## 2018-02-21 PROCEDURE — 82962 GLUCOSE BLOOD TEST: CPT

## 2018-02-21 PROCEDURE — 99285 EMERGENCY DEPT VISIT HI MDM: CPT | Mod: 25

## 2018-02-21 PROCEDURE — 84443 ASSAY THYROID STIM HORMONE: CPT

## 2018-02-21 PROCEDURE — 84484 ASSAY OF TROPONIN QUANT: CPT

## 2018-02-21 PROCEDURE — 96374 THER/PROPH/DIAG INJ IV PUSH: CPT

## 2018-02-21 PROCEDURE — 80048 BASIC METABOLIC PNL TOTAL CA: CPT

## 2018-02-21 PROCEDURE — 83036 HEMOGLOBIN GLYCOSYLATED A1C: CPT

## 2018-02-21 PROCEDURE — 93005 ELECTROCARDIOGRAM TRACING: CPT

## 2018-02-21 PROCEDURE — 82803 BLOOD GASES ANY COMBINATION: CPT

## 2018-02-21 RX ORDER — FUROSEMIDE 40 MG
2 TABLET ORAL
Qty: 0 | Refills: 0 | COMMUNITY
Start: 2018-02-21 | End: 2018-04-06

## 2018-02-21 RX ORDER — FUROSEMIDE 40 MG
1 TABLET ORAL
Qty: 45 | Refills: 0 | OUTPATIENT
Start: 2018-02-21 | End: 2018-04-06

## 2018-02-21 RX ORDER — ERGOCALCIFEROL 1.25 MG/1
1 CAPSULE ORAL
Qty: 4 | Refills: 0 | OUTPATIENT
Start: 2018-02-21 | End: 2018-03-22

## 2018-02-21 RX ORDER — FUROSEMIDE 40 MG
1 TABLET ORAL
Qty: 0 | Refills: 0 | COMMUNITY

## 2018-02-21 RX ADMIN — AMLODIPINE BESYLATE 10 MILLIGRAM(S): 2.5 TABLET ORAL at 05:38

## 2018-02-21 RX ADMIN — QUETIAPINE FUMARATE 25 MILLIGRAM(S): 200 TABLET, FILM COATED ORAL at 11:44

## 2018-02-21 RX ADMIN — ATENOLOL 50 MILLIGRAM(S): 25 TABLET ORAL at 05:38

## 2018-02-21 RX ADMIN — HEPARIN SODIUM 5000 UNIT(S): 5000 INJECTION INTRAVENOUS; SUBCUTANEOUS at 05:38

## 2018-02-21 RX ADMIN — DULOXETINE HYDROCHLORIDE 30 MILLIGRAM(S): 30 CAPSULE, DELAYED RELEASE ORAL at 11:44

## 2018-02-21 RX ADMIN — Medication 112 MICROGRAM(S): at 05:39

## 2018-02-21 RX ADMIN — PRAMIPEXOLE DIHYDROCHLORIDE 0.25 MILLIGRAM(S): 0.12 TABLET ORAL at 05:39

## 2018-02-21 RX ADMIN — PRAMIPEXOLE DIHYDROCHLORIDE 0.25 MILLIGRAM(S): 0.12 TABLET ORAL at 14:25

## 2018-02-21 RX ADMIN — Medication 20 MILLIGRAM(S): at 05:38

## 2018-02-21 RX ADMIN — Medication 5: at 11:44

## 2018-02-21 RX ADMIN — Medication 81 MILLIGRAM(S): at 11:44

## 2018-02-21 NOTE — DISCHARGE NOTE ADULT - INSTRUCTIONS
Please restrict fluid intake to less than 1L per day.     Please continue with diabetic diet with low/limited salt intake.

## 2018-02-21 NOTE — DISCHARGE NOTE ADULT - PLAN OF CARE
resolution of symptoms You were found to have acute congestive heart failure. An echocardiogram was performed which showed a normal ejection fraction, however diastolic (filling) pressures were increased. Therefore you were placed on a diuretic (lasix) to remove extra fluid from the body to relieve the heart. Please continue with lasix as above and follow-up with your doctor as outpatient. Your kidney function was affected. Please follow-up with your primary care doctor regarding your creatinine levels in 1 week. Please drink plenty of fluids, however do not exceed 1L per day. Continue with medications as above Continue with medications as above.

## 2018-02-21 NOTE — DISCHARGE NOTE ADULT - HOSPITAL COURSE
88 years old male from home, lives with wife, FELICITY x2 at baseline, ambulates with walker, PMH of HTN, DM and Dementia and PSH of Hernia surgery brought to ED by wife for shortness of breath since last night. Admitted for new onset CHF. Pt given IV lasix and tapered to PO gradually with good improvement. Pt had no telemetry events, and improved CXR. CXR on admission showed symmetrically prominent vascular markings in mid lower lung field suggesting CHF. Pro BNP 5107. EKG: NSR with TWI in 1, aVL (no prior ekg to compare). troponins negative. Echocardiogram showed 69% EF with grade 2 diastolic dysfunction.  Pt had syncopal episode prior to discharge, orthostatic negative, telemetry continued, and PO lasix dose cut in half to 20mg daily.   GOC on admission: pt is FULLCODE       Problem/Plan - 3:  ·  Problem: DENNIS (acute kidney injury).  Plan: -per wife, patient was seen by PCP recently and told that his kidney function is slightly affected, though not sure of exact numbers.   -Cr of 1.45 on admission, BUN / Cr ratio is 20.   -Cr improving on lasix, however increased since yesterday  f/u BMP.      Problem/Plan - 4:  ·  Problem: HTN (hypertension).  Plan: -c/w home meds Amlodipine 10 and Atenolol 50, monitor BP.      Problem/Plan - 5:  ·  Problem: Diabetes.  Plan: -Patient takes Insulin Toujeo 7 U qhs, Humalog 5 U tid pre meals and Glipizide 5 mg at home  -c/w Lantus 7 U qhs and HSS   -monitor accu cheks as hs and adjust insulin regimen if needed  -HbA1c - 7.7%.      Problem/Plan - 6:  Problem: Dementia. Plan: -c/w Donepezil  -fall precautions  -supportive care. 88 years old male from home, lives with wife, FELICITY x2 at baseline, ambulates with walker, PMH of HTN, DM and Dementia and PSH of Hernia surgery brought to ED by wife for shortness of breath since last night. Admitted for new onset CHF. Pt given IV lasix and tapered to PO gradually with good improvement. Pt had no telemetry events, and improved CXR. CXR on admission showed symmetrically prominent vascular markings in mid lower lung field suggesting CHF. Pro BNP 5107. EKG: NSR with TWI in 1, aVL (no prior ekg to compare). troponins negative. Echocardiogram showed 69% EF with grade 2 diastolic dysfunction.  Pt had syncopal episode prior to discharge, orthostatic negative, telemetry continued, and PO lasix dose cut in half to 20mg daily.   GOC on admission: pt is FULLCODE  Pt continued on home med amlodipine 10mg and atenolol 50mg for HTN.  Pt hBa1c is 7.7%   Pt continued on donepezil for dementia.       Pt is stable for discharge home. Pt to follow-up with PCP in one week of discharge. Discussed above with attending Dr. Gomez.

## 2018-02-21 NOTE — DISCHARGE NOTE ADULT - MEDICATION SUMMARY - MEDICATIONS TO CHANGE
I will SWITCH the dose or number of times a day I take the medications listed below when I get home from the hospital:    Lasix 20 mg oral tablet  -- 1 tab(s) by mouth every 48 hours

## 2018-02-21 NOTE — PROGRESS NOTE ADULT - SUBJECTIVE AND OBJECTIVE BOX
Subjective: Subjective: pt seen and examined, no complaints on exam.  no chest pain , SOB , palpitation on exam    amLODIPine   Tablet 10 milliGRAM(s) Oral daily  aspirin  chewable 81 milliGRAM(s) Oral daily  ATENolol  Tablet 50 milliGRAM(s) Oral daily  donepezil 10 milliGRAM(s) Oral at bedtime  DULoxetine 30 milliGRAM(s) Oral daily  ergocalciferol 54277 Unit(s) Oral <User Schedule>  furosemide    Tablet 20 milliGRAM(s) Oral daily  heparin  Injectable 5000 Unit(s) SubCutaneous every 12 hours  insulin glargine Injectable (LANTUS) 7 Unit(s) SubCutaneous at bedtime  insulin lispro (HumaLOG) corrective regimen sliding scale   SubCutaneous three times a day before meals  levothyroxine 112 MICROGram(s) Oral daily  pramipexole 0.25 milliGRAM(s) Oral three times a day  pregabalin 200 milliGRAM(s) Oral at bedtime  QUEtiapine 25 milliGRAM(s) Oral daily  simvastatin 5 milliGRAM(s) Oral at bedtime                            11.6   6.8   )-----------( 255      ( 19 Feb 2018 07:07 )             36.8       Hemoglobin: 11.6 g/dL (02-19 @ 07:07)  Hemoglobin: 10.1 g/dL (02-18 @ 07:22)  Hemoglobin: 10.5 g/dL (02-17 @ 07:50)  Hemoglobin: 10.9 g/dL (02-16 @ 17:13)      02-20    139  |  100  |  39<H>  ----------------------------<  150<H>  3.3<L>   |  32<H>  |  1.57<H>    Ca    8.2<L>      20 Feb 2018 07:59  Phos  3.3     02-20  Mg     1.9     02-20      Creatinine Trend: 1.57<--, 1.35<--, 1.33<--, 1.61<--, 1.45<--    COAGS:           T(C): 36.4 (02-21-18 @ 04:32), Max: 36.9 (02-20-18 @ 11:05)  HR: 64 (02-21-18 @ 04:32) (56 - 92)  BP: 170/59 (02-21-18 @ 04:32) (118/56 - 170/59)  RR: 18 (02-21-18 @ 04:32) (17 - 18)  SpO2: 98% (02-21-18 @ 04:32) (96% - 99%)  Wt(kg): --    I&O's Summary    19 Feb 2018 07:01  -  20 Feb 2018 07:00  --------------------------------------------------------  IN: 700 mL / OUT: 1000 mL / NET: -300 mL    20 Feb 2018 07:01  -  21 Feb 2018 05:29  --------------------------------------------------------  IN: 400 mL / OUT: 450 mL / NET: -50 mL        HEENT:   Normal oral mucosa, PERRL, EOMI	  Lymphatic: No lymphadenopathy , no edema  Cardiovascular: Normal S1 S2, No JVD, No murmurs , Peripheral pulses palpable 2+ bilaterally  Respiratory: Lungs clear to auscultation, normal effort 	  Gastrointestinal:  Soft, Non-tender, + BS	    TELEMETRY:  off      DIAGNOSTIC TESTING:  [ ] Echocardiogram:  1< from: Transthoracic Echocardiogram (02.17.18 @ 07:15) >  CONCLUSIONS:  1. Normal Left Ventricular Systolic Function,  (EF = 55 to  60%)  2. Grade II diastolic dysfunction.    ------------------------------------------------------------------------  Confirmed on  2/18/2018 - 09:21:52 by Adam Renee MD  ------------------------------------------------------------------------    < end of copied text >    [ ]  Catheterization:  [ ] Stress Test:    OTHER: 	    ASSESSMENT/PLAN: 	88y Male PMH mild dementia admitted with dyspnea. CXR is concerning for pulmonary edema. No echo on file. He denies palpitations, syncope, nor angina.       GI / DVT prophylaxis.   keep K>4, mag >2.0    cont BB  discharge on po lasix  ASA/ Statin   no further CV work up needed at present .  D/W Dr Florentino Subjective: Subjective: pt seen and examined, no complaints on exam.  no chest pain , SOB , palpitation on exam, events noted, appears to have dozed off to sleep in a share RRT called, placed on tele    amLODIPine   Tablet 10 milliGRAM(s) Oral daily  aspirin  chewable 81 milliGRAM(s) Oral daily  ATENolol  Tablet 50 milliGRAM(s) Oral daily  donepezil 10 milliGRAM(s) Oral at bedtime  DULoxetine 30 milliGRAM(s) Oral daily  ergocalciferol 94184 Unit(s) Oral <User Schedule>  furosemide    Tablet 20 milliGRAM(s) Oral daily  heparin  Injectable 5000 Unit(s) SubCutaneous every 12 hours  insulin glargine Injectable (LANTUS) 7 Unit(s) SubCutaneous at bedtime  insulin lispro (HumaLOG) corrective regimen sliding scale   SubCutaneous three times a day before meals  levothyroxine 112 MICROGram(s) Oral daily  pramipexole 0.25 milliGRAM(s) Oral three times a day  pregabalin 200 milliGRAM(s) Oral at bedtime  QUEtiapine 25 milliGRAM(s) Oral daily  simvastatin 5 milliGRAM(s) Oral at bedtime                            11.6   6.8   )-----------( 255      ( 19 Feb 2018 07:07 )             36.8       Hemoglobin: 11.6 g/dL (02-19 @ 07:07)  Hemoglobin: 10.1 g/dL (02-18 @ 07:22)  Hemoglobin: 10.5 g/dL (02-17 @ 07:50)  Hemoglobin: 10.9 g/dL (02-16 @ 17:13)      02-20    139  |  100  |  39<H>  ----------------------------<  150<H>  3.3<L>   |  32<H>  |  1.57<H>    Ca    8.2<L>      20 Feb 2018 07:59  Phos  3.3     02-20  Mg     1.9     02-20      Creatinine Trend: 1.57<--, 1.35<--, 1.33<--, 1.61<--, 1.45<--    COAGS:           T(C): 36.4 (02-21-18 @ 04:32), Max: 36.9 (02-20-18 @ 11:05)  HR: 64 (02-21-18 @ 04:32) (56 - 92)  BP: 170/59 (02-21-18 @ 04:32) (118/56 - 170/59)  RR: 18 (02-21-18 @ 04:32) (17 - 18)  SpO2: 98% (02-21-18 @ 04:32) (96% - 99%)  Wt(kg): --    I&O's Summary    19 Feb 2018 07:01  -  20 Feb 2018 07:00  --------------------------------------------------------  IN: 700 mL / OUT: 1000 mL / NET: -300 mL    20 Feb 2018 07:01  -  21 Feb 2018 05:29  --------------------------------------------------------  IN: 400 mL / OUT: 450 mL / NET: -50 mL        HEENT:   Normal oral mucosa, PERRL, EOMI	  Lymphatic: No lymphadenopathy , no edema  Cardiovascular: Normal S1 S2, No JVD, No murmurs , Peripheral pulses palpable 2+ bilaterally  Respiratory: Lungs clear to auscultation, normal effort 	  Gastrointestinal:  Soft, Non-tender, + BS	    TELEMETRY:  off      DIAGNOSTIC TESTING:  [ ] Echocardiogram:  1< from: Transthoracic Echocardiogram (02.17.18 @ 07:15) >  CONCLUSIONS:  1. Normal Left Ventricular Systolic Function,  (EF = 55 to  60%)  2. Grade II diastolic dysfunction.    ------------------------------------------------------------------------  Confirmed on  2/18/2018 - 09:21:52 by Adam Renee MD  ------------------------------------------------------------------------    < end of copied text >    [ ]  Catheterization:  [ ] Stress Test:    OTHER: 	    ASSESSMENT/PLAN: 	88y Male PMH mild dementia admitted with dyspnea. CXR is concerning for pulmonary edema. No echo on file. He denies palpitations, syncope, nor angina.       GI / DVT prophylaxis.   keep K>4, mag >2.0    cont BB  discharge on po lasix  ASA/ Statin   no further CV work up needed at present .  D/W Dr Florentino

## 2018-02-21 NOTE — PROGRESS NOTE ADULT - ATTENDING COMMENTS
CARDIOLOGY ATTENDING    Agree with above. He is an 87 y/o male PMH mild dementia admitted with dyspnea. CXR is concerning for pulmonary edema. He denies palpitations, syncope, nor angina. Echo is normal.    -change to po lasix 40mg daily  -no further inpatient cardiac workup needed  -f/u with his outpatient cardiologist after discharge
Patient seen and examined, agree with above assessment and plan as transcribed above.    - Preserved LV function, and narrow QRS complex on EKG, clinical scenario suggests he is falling asleep while sitting as observed this AM    Michael Florentino MD, [facility], [facility]  [address] [place] [address]  PHONE:  [phone]  BEEPER : [phone]
PATIENT IS SEEN AND EXAMINED  - S/P RAPID RESPONSE FOR SUSPECTED SYNCOPE - VASOVAGAL  - CHF - CONTINUE LASIX  - D/W STAFF  - DC PLAN TO [facility] OR LONG TERM  - DR. FLORES

## 2018-02-21 NOTE — PROGRESS NOTE ADULT - PROVIDER SPECIALTY LIST ADULT
Cardiology
Internal Medicine

## 2018-02-21 NOTE — DISCHARGE NOTE ADULT - MEDICATION SUMMARY - MEDICATIONS TO TAKE
I will START or STAY ON the medications listed below when I get home from the hospital:    aspirin 81 mg oral tablet  -- 1 tab(s) by mouth once a day  -- Indication: For Prophylactic measure    pregabalin 200 mg oral capsule  -- 1 cap(s) by mouth once a day (at bedtime) MDD:not to exceed one dose per day  -- Indication: For neuropathy    DULoxetine 30 mg oral delayed release capsule  -- 1 cap(s) by mouth once a day (at bedtime)  -- Indication: For Depression    glipiZIDE 5 mg oral tablet  -- 1 tab(s) by mouth once a day  -- Indication: For Diabetes    HumaLOG 100 units/mL subcutaneous solution  -- 5 unit(s) subcutaneous 3 times a day (before meals)  -- Indication: For Diabetes    insulin  -- 7 unit(s) subcutaneous once a day (at bedtime)  -- Indication: For Diabetes    simvastatin 5 mg oral tablet  -- 1 tab(s) by mouth once a day (at bedtime)  -- Indication: For Hyperlipidemia    pramipexole 0.25 mg oral tablet  -- 1 tab(s) by mouth 3 times a day  -- Indication: For Depression    SEROquel 25 mg oral tablet  -- 1 tab(s) by mouth once a day  -- Indication: For Depression    atenolol 50 mg oral tablet  -- 1 tab(s) by mouth once a day  -- Indication: For HTN (hypertension)    Norvasc 10 mg oral tablet  -- 1 tab(s) by mouth once a day  -- Indication: For HTN (hypertension)    donepezil 10 mg oral tablet  -- 1 tab(s) by mouth once a day (at bedtime)  -- Indication: For Dementia    furosemide 20 mg oral tablet  -- 1 tab(s) by mouth once a day  -- Indication: For Heart failure    levothyroxine 112 mcg (0.112 mg) oral tablet  -- 1 tab(s) by mouth once a day  -- Indication: For Hypothyroidism    ergocalciferol 50,000 intl units (1.25 mg) oral capsule  -- 1 cap(s) by mouth once a week  -- Indication: For vitamin D deficiency

## 2018-02-21 NOTE — DISCHARGE NOTE ADULT - CARE PLAN
Principal Discharge DX:	Acute congestive heart failure, unspecified congestive heart failure type  Goal:	resolution of symptoms  Assessment and plan of treatment:	You were found to have acute congestive heart failure. An echocardiogram was performed which showed a normal ejection fraction, however diastolic (filling) pressures were increased. Therefore you were placed on a diuretic (lasix) to remove extra fluid from the body to relieve the heart. Please continue with lasix as above and follow-up with your doctor as outpatient.  Secondary Diagnosis:	DENNIS (acute kidney injury)  Secondary Diagnosis:	Diabetes  Secondary Diagnosis:	HTN (hypertension)  Secondary Diagnosis:	Dementia Principal Discharge DX:	Acute congestive heart failure, unspecified congestive heart failure type  Goal:	resolution of symptoms  Assessment and plan of treatment:	You were found to have acute congestive heart failure. An echocardiogram was performed which showed a normal ejection fraction, however diastolic (filling) pressures were increased. Therefore you were placed on a diuretic (lasix) to remove extra fluid from the body to relieve the heart. Please continue with lasix as above and follow-up with your doctor as outpatient.  Secondary Diagnosis:	DENNIS (acute kidney injury)  Assessment and plan of treatment:	Your kidney function was affected. Please follow-up with your primary care doctor regarding your creatinine levels in 1 week. Please drink plenty of fluids, however do not exceed 1L per day.  Secondary Diagnosis:	Diabetes  Assessment and plan of treatment:	Continue with medications as above  Secondary Diagnosis:	HTN (hypertension)  Assessment and plan of treatment:	Continue with medications as above.  Secondary Diagnosis:	Dementia  Assessment and plan of treatment:	Continue with medications as above. Principal Discharge DX:	Acute congestive heart failure, unspecified congestive heart failure type  Goal:	resolution of symptoms  Assessment and plan of treatment:	You were found to have acute congestive heart failure. An echocardiogram was performed which showed a normal ejection fraction, however diastolic (filling) pressures were increased. Therefore you were placed on a diuretic (lasix) to remove extra fluid from the body to relieve the heart. Please continue with lasix as above and follow-up with your doctor as outpatient.  Secondary Diagnosis:	DENNIS (acute kidney injury)  Assessment and plan of treatment:	Your kidney function was affected. Please follow-up with your primary care doctor regarding your creatinine levels in 1 week. Please drink plenty of fluids, however do not exceed 1L per day.  Secondary Diagnosis:	Diabetes  Assessment and plan of treatment:	Continue with medications as above  Secondary Diagnosis:	HTN (hypertension)  Assessment and plan of treatment:	Continue with medications as above.  Secondary Diagnosis:	Dementia  Assessment and plan of treatment:	Continue with medications as above.  Secondary Diagnosis:	Vitamin D deficiency no

## 2018-02-21 NOTE — DISCHARGE NOTE ADULT - PATIENT PORTAL LINK FT
You can access the [facility] Patient Portal, offered by [facility], by registering with the following website: [email or web address]

## 2018-02-21 NOTE — DISCHARGE NOTE ADULT - CARE PROVIDER_API CALL
Alon Schrader), Geriatric Medicine; [facility]; [facility] Medicine  [address] [address]  Phone: [phone]  Fax: [phone]

## 2018-02-26 ENCOUNTER — APPOINTMENT (OUTPATIENT)
Dept: GERIATRICS | Facility: CLINIC | Age: 83
End: 2018-02-26
Payer: MEDICARE

## 2018-02-26 VITALS
SYSTOLIC BLOOD PRESSURE: 118 MMHG | WEIGHT: 55.06 LBS | OXYGEN SATURATION: 94 % | HEIGHT: 68 IN | RESPIRATION RATE: 16 BRPM | HEART RATE: 60 BPM | TEMPERATURE: 97.4 F | BODY MASS INDEX: 8.34 KG/M2 | DIASTOLIC BLOOD PRESSURE: 60 MMHG

## 2018-02-26 DIAGNOSIS — Z71.89 OTHER SPECIFIED COUNSELING: ICD-10-CM

## 2018-02-26 DIAGNOSIS — Z00.00 ENCOUNTER FOR GENERAL ADULT MEDICAL EXAMINATION W/OUT ABNORMAL FINDINGS: ICD-10-CM

## 2018-02-26 PROBLEM — F03.90 UNSPECIFIED DEMENTIA WITHOUT BEHAVIORAL DISTURBANCE: Chronic | Status: ACTIVE | Noted: 2018-02-16

## 2018-02-26 PROBLEM — I10 ESSENTIAL (PRIMARY) HYPERTENSION: Chronic | Status: ACTIVE | Noted: 2018-02-16

## 2018-02-26 PROBLEM — E11.9 TYPE 2 DIABETES MELLITUS WITHOUT COMPLICATIONS: Chronic | Status: ACTIVE | Noted: 2018-02-16

## 2018-02-26 PROCEDURE — 99496 TRANSJ CARE MGMT HIGH F2F 7D: CPT | Mod: GC

## 2018-02-26 RX ORDER — SIMVASTATIN 5 MG/1
5 TABLET, FILM COATED ORAL
Qty: 90 | Refills: 0 | Status: DISCONTINUED | COMMUNITY
Start: 2016-12-07 | End: 2018-02-26

## 2018-02-26 RX ORDER — FUROSEMIDE 20 MG/1
20 TABLET ORAL
Qty: 15 | Refills: 5 | Status: DISCONTINUED | COMMUNITY
Start: 2018-02-15 | End: 2018-02-26

## 2018-02-26 RX ORDER — ERGOCALCIFEROL 1.25 MG/1
1.25 MG CAPSULE, LIQUID FILLED ORAL
Qty: 4 | Refills: 0 | Status: COMPLETED | COMMUNITY
Start: 2018-02-26 | End: 2018-03-26

## 2018-05-21 ENCOUNTER — RX RENEWAL (OUTPATIENT)
Age: 83
End: 2018-05-21

## 2018-05-31 ENCOUNTER — APPOINTMENT (OUTPATIENT)
Dept: GERIATRICS | Facility: CLINIC | Age: 83
End: 2018-05-31
Payer: MEDICARE

## 2018-05-31 VITALS
WEIGHT: 156 LBS | OXYGEN SATURATION: 94 % | TEMPERATURE: 97.7 F | DIASTOLIC BLOOD PRESSURE: 60 MMHG | BODY MASS INDEX: 23.72 KG/M2 | HEART RATE: 69 BPM | SYSTOLIC BLOOD PRESSURE: 110 MMHG

## 2018-05-31 PROCEDURE — 99214 OFFICE O/P EST MOD 30 MIN: CPT

## 2018-06-01 LAB
ANION GAP SERPL CALC-SCNC: 18 MMOL/L
BUN SERPL-MCNC: 32 MG/DL
CALCIUM SERPL-MCNC: 8.5 MG/DL
CHLORIDE SERPL-SCNC: 97 MMOL/L
CO2 SERPL-SCNC: 26 MMOL/L
CREAT SERPL-MCNC: 1.85 MG/DL
GLUCOSE SERPL-MCNC: 184 MG/DL
HBA1C MFR BLD HPLC: 7.3 %
POTASSIUM SERPL-SCNC: 3.5 MMOL/L
SODIUM SERPL-SCNC: 141 MMOL/L

## 2018-06-04 ENCOUNTER — APPOINTMENT (OUTPATIENT)
Dept: GERIATRICS | Facility: CLINIC | Age: 83
End: 2018-06-04
Payer: MEDICARE

## 2018-06-04 ENCOUNTER — APPOINTMENT (OUTPATIENT)
Dept: RADIOLOGY | Facility: IMAGING CENTER | Age: 83
End: 2018-06-04

## 2018-06-04 VITALS
OXYGEN SATURATION: 91 % | RESPIRATION RATE: 18 BRPM | DIASTOLIC BLOOD PRESSURE: 60 MMHG | WEIGHT: 157 LBS | SYSTOLIC BLOOD PRESSURE: 160 MMHG | HEART RATE: 81 BPM | TEMPERATURE: 96.7 F | HEIGHT: 68 IN | BODY MASS INDEX: 23.79 KG/M2

## 2018-06-04 DIAGNOSIS — R09.89 OTHER SPECIFIED SYMPTOMS AND SIGNS INVOLVING THE CIRCULATORY AND RESPIRATORY SYSTEMS: ICD-10-CM

## 2018-06-04 PROCEDURE — 99215 OFFICE O/P EST HI 40 MIN: CPT

## 2018-06-04 RX ORDER — LEVOTHYROXINE SODIUM 0.11 MG/1
112 TABLET ORAL DAILY
Qty: 90 | Refills: 1 | Status: ACTIVE | COMMUNITY
Start: 2017-01-20

## 2018-06-04 RX ORDER — ASPIRIN 81 MG/1
81 TABLET ORAL DAILY
Refills: 0 | Status: ACTIVE | COMMUNITY
Start: 2018-02-15

## 2018-06-04 RX ORDER — ATENOLOL 50 MG/1
50 TABLET ORAL
Qty: 90 | Refills: 3 | Status: ACTIVE | COMMUNITY
Start: 2016-12-27

## 2018-06-04 RX ORDER — PRAMIPEXOLE DIHYDROCHLORIDE 0.25 MG/1
0.25 TABLET ORAL
Qty: 270 | Refills: 0 | Status: ACTIVE | COMMUNITY
Start: 2017-08-16

## 2018-06-04 RX ORDER — ALBUTEROL SULFATE 2.5 MG/3ML
(2.5 MG/3ML) SOLUTION RESPIRATORY (INHALATION)
Qty: 1 | Refills: 0 | Status: DISCONTINUED | COMMUNITY
Start: 2018-06-04 | End: 2018-06-04

## 2018-06-04 RX ORDER — BLOOD SUGAR DIAGNOSTIC
STRIP MISCELLANEOUS
Qty: 600 | Refills: 0 | Status: ACTIVE | COMMUNITY
Start: 2017-04-26

## 2018-06-04 RX ORDER — MULTIVIT-MIN/FOLIC/VIT K/LYCOP 400-300MCG
25 MCG TABLET ORAL DAILY
Qty: 30 | Refills: 3 | Status: ACTIVE | COMMUNITY
Start: 2018-06-04

## 2018-06-04 RX ORDER — DONEPEZIL HYDROCHLORIDE 10 MG/1
10 TABLET ORAL DAILY
Qty: 90 | Refills: 3 | Status: ACTIVE | COMMUNITY
Start: 2016-12-05

## 2018-06-04 RX ORDER — SIMVASTATIN 5 MG/1
5 TABLET, FILM COATED ORAL
Refills: 0 | Status: ACTIVE | COMMUNITY
Start: 2018-02-26

## 2018-06-04 RX ORDER — OMEGA-3/DHA/EPA/FISH OIL 300-1000MG
1000 CAPSULE ORAL
Refills: 0 | Status: ACTIVE | COMMUNITY
Start: 2018-06-04

## 2018-06-05 LAB
ANION GAP SERPL CALC-SCNC: 19 MMOL/L
BASOPHILS # BLD AUTO: 0.02 K/UL
BASOPHILS NFR BLD AUTO: 0.2 %
BUN SERPL-MCNC: 28 MG/DL
CALCIUM SERPL-MCNC: 9.1 MG/DL
CHLORIDE SERPL-SCNC: 98 MMOL/L
CO2 SERPL-SCNC: 25 MMOL/L
CREAT SERPL-MCNC: 1.76 MG/DL
EOSINOPHIL # BLD AUTO: 0.08 K/UL
EOSINOPHIL NFR BLD AUTO: 0.7 %
FERRITIN SERPL-MCNC: 141 NG/ML
GLUCOSE SERPL-MCNC: 116 MG/DL
HCT VFR BLD CALC: 36.5 %
HGB BLD-MCNC: 11.3 G/DL
IMM GRANULOCYTES NFR BLD AUTO: 0.2 %
IRON SATN MFR SERPL: 12 %
IRON SERPL-MCNC: 31 UG/DL
LYMPHOCYTES # BLD AUTO: 2.33 K/UL
LYMPHOCYTES NFR BLD AUTO: 19.3 %
MAN DIFF?: NORMAL
MCHC RBC-ENTMCNC: 29.2 PG
MCHC RBC-ENTMCNC: 31 GM/DL
MCV RBC AUTO: 94.3 FL
MONOCYTES # BLD AUTO: 1.27 K/UL
MONOCYTES NFR BLD AUTO: 10.5 %
NEUTROPHILS # BLD AUTO: 8.36 K/UL
NEUTROPHILS NFR BLD AUTO: 69.1 %
NT-PROBNP SERPL-MCNC: ABNORMAL PG/ML
PLATELET # BLD AUTO: 233 K/UL
POTASSIUM SERPL-SCNC: 3.4 MMOL/L
RBC # BLD: 3.87 M/UL
RBC # FLD: 16.2 %
SODIUM SERPL-SCNC: 142 MMOL/L
TIBC SERPL-MCNC: 262 UG/DL
UIBC SERPL-MCNC: 231 UG/DL
WBC # FLD AUTO: 12.09 K/UL

## 2018-06-07 ENCOUNTER — APPOINTMENT (OUTPATIENT)
Dept: CARDIOLOGY | Facility: CLINIC | Age: 83
End: 2018-06-07
Payer: MEDICARE

## 2018-06-07 ENCOUNTER — NON-APPOINTMENT (OUTPATIENT)
Age: 83
End: 2018-06-07

## 2018-06-07 VITALS
DIASTOLIC BLOOD PRESSURE: 61 MMHG | SYSTOLIC BLOOD PRESSURE: 153 MMHG | BODY MASS INDEX: 23.49 KG/M2 | HEIGHT: 68 IN | WEIGHT: 155 LBS | OXYGEN SATURATION: 83 % | HEART RATE: 77 BPM

## 2018-06-07 DIAGNOSIS — Z87.891 PERSONAL HISTORY OF NICOTINE DEPENDENCE: ICD-10-CM

## 2018-06-07 PROCEDURE — 93000 ELECTROCARDIOGRAM COMPLETE: CPT

## 2018-06-07 PROCEDURE — 99204 OFFICE O/P NEW MOD 45 MIN: CPT

## 2018-06-13 RX ORDER — MEMANTINE HYDROCHLORIDE 5 MG/1
5 TABLET, FILM COATED ORAL TWICE DAILY
Refills: 0 | Status: ACTIVE | COMMUNITY
Start: 2018-03-29

## 2018-06-13 RX ORDER — PREGABALIN 100 MG/1
100 CAPSULE ORAL
Qty: 90 | Refills: 0 | Status: DISCONTINUED | COMMUNITY
Start: 2017-12-29 | End: 2018-06-13

## 2018-07-09 ENCOUNTER — NON-APPOINTMENT (OUTPATIENT)
Age: 83
End: 2018-07-09

## 2018-07-09 ENCOUNTER — APPOINTMENT (OUTPATIENT)
Dept: CARDIOLOGY | Facility: CLINIC | Age: 83
End: 2018-07-09
Payer: MEDICARE

## 2018-07-09 VITALS
DIASTOLIC BLOOD PRESSURE: 64 MMHG | SYSTOLIC BLOOD PRESSURE: 149 MMHG | OXYGEN SATURATION: 98 % | BODY MASS INDEX: 22.66 KG/M2 | HEART RATE: 66 BPM | WEIGHT: 149 LBS

## 2018-07-09 PROCEDURE — 99214 OFFICE O/P EST MOD 30 MIN: CPT

## 2018-07-09 PROCEDURE — 93000 ELECTROCARDIOGRAM COMPLETE: CPT

## 2018-07-17 ENCOUNTER — APPOINTMENT (OUTPATIENT)
Dept: CARDIOLOGY | Facility: CLINIC | Age: 83
End: 2018-07-17
Payer: MEDICARE

## 2018-07-17 ENCOUNTER — INPATIENT (INPATIENT)
Facility: HOSPITAL | Age: 83
LOS: 5 days | Discharge: NURSING, REHAB OR LONG-TERM CARE | DRG: 291 | End: 2018-07-23
Attending: HOSPITALIST | Admitting: HOSPITALIST
Payer: MEDICARE

## 2018-07-17 ENCOUNTER — NON-APPOINTMENT (OUTPATIENT)
Age: 83
End: 2018-07-17

## 2018-07-17 VITALS
HEART RATE: 62 BPM | OXYGEN SATURATION: 86 % | BODY MASS INDEX: 23.79 KG/M2 | HEIGHT: 68 IN | WEIGHT: 157 LBS | SYSTOLIC BLOOD PRESSURE: 140 MMHG | DIASTOLIC BLOOD PRESSURE: 80 MMHG

## 2018-07-17 VITALS — RESPIRATION RATE: 28 BRPM | OXYGEN SATURATION: 89 %

## 2018-07-17 VITALS
HEART RATE: 82 BPM | RESPIRATION RATE: 26 BRPM | DIASTOLIC BLOOD PRESSURE: 72 MMHG | OXYGEN SATURATION: 75 % | SYSTOLIC BLOOD PRESSURE: 205 MMHG

## 2018-07-17 DIAGNOSIS — J81.0 ACUTE PULMONARY EDEMA: ICD-10-CM

## 2018-07-17 DIAGNOSIS — R06.2 WHEEZING: ICD-10-CM

## 2018-07-17 DIAGNOSIS — Z98.890 OTHER SPECIFIED POSTPROCEDURAL STATES: Chronic | ICD-10-CM

## 2018-07-17 LAB
ALBUMIN SERPL ELPH-MCNC: 4.4 G/DL — SIGNIFICANT CHANGE UP (ref 3.3–5)
ALP SERPL-CCNC: 164 U/L — HIGH (ref 40–120)
ALT FLD-CCNC: 26 U/L — SIGNIFICANT CHANGE UP (ref 10–45)
ANION GAP SERPL CALC-SCNC: 18 MMOL/L — HIGH (ref 5–17)
APTT BLD: 31.2 SEC — SIGNIFICANT CHANGE UP (ref 27.5–37.4)
AST SERPL-CCNC: 35 U/L — SIGNIFICANT CHANGE UP (ref 10–40)
BASE EXCESS BLDV CALC-SCNC: 1.4 MMOL/L — SIGNIFICANT CHANGE UP (ref -2–2)
BASOPHILS # BLD AUTO: 0 K/UL — SIGNIFICANT CHANGE UP (ref 0–0.2)
BASOPHILS NFR BLD AUTO: 0.3 % — SIGNIFICANT CHANGE UP (ref 0–2)
BILIRUB SERPL-MCNC: 0.5 MG/DL — SIGNIFICANT CHANGE UP (ref 0.2–1.2)
BUN SERPL-MCNC: 33 MG/DL — HIGH (ref 7–23)
CA-I SERPL-SCNC: 1.13 MMOL/L — SIGNIFICANT CHANGE UP (ref 1.12–1.3)
CALCIUM SERPL-MCNC: 8.8 MG/DL — SIGNIFICANT CHANGE UP (ref 8.4–10.5)
CHLORIDE BLDV-SCNC: 101 MMOL/L — SIGNIFICANT CHANGE UP (ref 96–108)
CHLORIDE SERPL-SCNC: 97 MMOL/L — SIGNIFICANT CHANGE UP (ref 96–108)
CO2 BLDV-SCNC: 29 MMOL/L — SIGNIFICANT CHANGE UP (ref 22–30)
CO2 SERPL-SCNC: 23 MMOL/L — SIGNIFICANT CHANGE UP (ref 22–31)
CREAT SERPL-MCNC: 1.7 MG/DL — HIGH (ref 0.5–1.3)
EOSINOPHIL # BLD AUTO: 0.1 K/UL — SIGNIFICANT CHANGE UP (ref 0–0.5)
EOSINOPHIL NFR BLD AUTO: 0.9 % — SIGNIFICANT CHANGE UP (ref 0–6)
GAS PNL BLDV: 136 MMOL/L — SIGNIFICANT CHANGE UP (ref 136–145)
GAS PNL BLDV: SIGNIFICANT CHANGE UP
GLUCOSE BLDV-MCNC: 103 MG/DL — HIGH (ref 70–99)
GLUCOSE SERPL-MCNC: 181 MG/DL — HIGH (ref 70–99)
HCO3 BLDV-SCNC: 28 MMOL/L — SIGNIFICANT CHANGE UP (ref 21–29)
HCT VFR BLD CALC: 33.7 % — LOW (ref 39–50)
HCT VFR BLDA CALC: 32 % — LOW (ref 39–50)
HGB BLD CALC-MCNC: 10.3 G/DL — LOW (ref 13–17)
HGB BLD-MCNC: 11.1 G/DL — LOW (ref 13–17)
HOROWITZ INDEX BLDV+IHG-RTO: SIGNIFICANT CHANGE UP
INR BLD: 1.16 RATIO — SIGNIFICANT CHANGE UP (ref 0.88–1.16)
LACTATE BLDV-MCNC: 1.6 MMOL/L — SIGNIFICANT CHANGE UP (ref 0.7–2)
LYMPHOCYTES # BLD AUTO: 1.5 K/UL — SIGNIFICANT CHANGE UP (ref 1–3.3)
LYMPHOCYTES # BLD AUTO: 11.8 % — LOW (ref 13–44)
MCHC RBC-ENTMCNC: 32.3 PG — SIGNIFICANT CHANGE UP (ref 27–34)
MCHC RBC-ENTMCNC: 33 GM/DL — SIGNIFICANT CHANGE UP (ref 32–36)
MCV RBC AUTO: 97.9 FL — SIGNIFICANT CHANGE UP (ref 80–100)
MONOCYTES # BLD AUTO: 1.1 K/UL — HIGH (ref 0–0.9)
MONOCYTES NFR BLD AUTO: 8.7 % — SIGNIFICANT CHANGE UP (ref 2–14)
NEUTROPHILS # BLD AUTO: 10.1 K/UL — HIGH (ref 1.8–7.4)
NEUTROPHILS NFR BLD AUTO: 78.3 % — HIGH (ref 43–77)
NT-PROBNP SERPL-SCNC: 7873 PG/ML — HIGH (ref 0–300)
PCO2 BLDV: 56 MMHG — HIGH (ref 35–50)
PH BLDV: 7.32 — LOW (ref 7.35–7.45)
PLATELET # BLD AUTO: 203 K/UL — SIGNIFICANT CHANGE UP (ref 150–400)
PO2 BLDV: 30 MMHG — SIGNIFICANT CHANGE UP (ref 25–45)
POTASSIUM BLDV-SCNC: 3.6 MMOL/L — SIGNIFICANT CHANGE UP (ref 3.5–5.3)
POTASSIUM SERPL-MCNC: 3.7 MMOL/L — SIGNIFICANT CHANGE UP (ref 3.5–5.3)
POTASSIUM SERPL-SCNC: 3.7 MMOL/L — SIGNIFICANT CHANGE UP (ref 3.5–5.3)
PROT SERPL-MCNC: 7.7 G/DL — SIGNIFICANT CHANGE UP (ref 6–8.3)
PROTHROM AB SERPL-ACNC: 12.6 SEC — SIGNIFICANT CHANGE UP (ref 9.8–12.7)
RBC # BLD: 3.44 M/UL — LOW (ref 4.2–5.8)
RBC # FLD: 14.1 % — SIGNIFICANT CHANGE UP (ref 10.3–14.5)
SAO2 % BLDV: 48 % — LOW (ref 67–88)
SODIUM SERPL-SCNC: 138 MMOL/L — SIGNIFICANT CHANGE UP (ref 135–145)
TROPONIN T, HIGH SENSITIVITY RESULT: 59 NG/L — HIGH (ref 0–51)
TROPONIN T, HIGH SENSITIVITY RESULT: 61 NG/L — HIGH (ref 0–51)
WBC # BLD: 12.8 K/UL — HIGH (ref 3.8–10.5)
WBC # FLD AUTO: 12.8 K/UL — HIGH (ref 3.8–10.5)

## 2018-07-17 PROCEDURE — 99285 EMERGENCY DEPT VISIT HI MDM: CPT | Mod: 25

## 2018-07-17 PROCEDURE — 71045 X-RAY EXAM CHEST 1 VIEW: CPT | Mod: 26

## 2018-07-17 PROCEDURE — 93010 ELECTROCARDIOGRAM REPORT: CPT

## 2018-07-17 PROCEDURE — 93000 ELECTROCARDIOGRAM COMPLETE: CPT

## 2018-07-17 PROCEDURE — 99215 OFFICE O/P EST HI 40 MIN: CPT

## 2018-07-17 RX ORDER — DEXTROSE 50 % IN WATER 50 %
25 SYRINGE (ML) INTRAVENOUS ONCE
Qty: 0 | Refills: 0 | Status: DISCONTINUED | OUTPATIENT
Start: 2018-07-17 | End: 2018-07-23

## 2018-07-17 RX ORDER — NITROGLYCERIN 6.5 MG
50 CAPSULE, EXTENDED RELEASE ORAL
Qty: 50 | Refills: 0 | Status: DISCONTINUED | OUTPATIENT
Start: 2018-07-17 | End: 2018-07-17

## 2018-07-17 RX ORDER — INSULIN LISPRO 100/ML
VIAL (ML) SUBCUTANEOUS
Qty: 0 | Refills: 0 | Status: DISCONTINUED | OUTPATIENT
Start: 2018-07-17 | End: 2018-07-23

## 2018-07-17 RX ORDER — HEPARIN SODIUM 5000 [USP'U]/ML
5000 INJECTION INTRAVENOUS; SUBCUTANEOUS EVERY 8 HOURS
Qty: 0 | Refills: 0 | Status: DISCONTINUED | OUTPATIENT
Start: 2018-07-17 | End: 2018-07-18

## 2018-07-17 RX ORDER — FUROSEMIDE 40 MG
80 TABLET ORAL ONCE
Qty: 0 | Refills: 0 | Status: COMPLETED | OUTPATIENT
Start: 2018-07-17 | End: 2018-07-17

## 2018-07-17 RX ORDER — SODIUM CHLORIDE 9 MG/ML
1000 INJECTION, SOLUTION INTRAVENOUS
Qty: 0 | Refills: 0 | Status: DISCONTINUED | OUTPATIENT
Start: 2018-07-17 | End: 2018-07-23

## 2018-07-17 RX ORDER — NITROGLYCERIN 6.5 MG
1 CAPSULE, EXTENDED RELEASE ORAL ONCE
Qty: 0 | Refills: 0 | Status: COMPLETED | OUTPATIENT
Start: 2018-07-17 | End: 2018-07-17

## 2018-07-17 RX ORDER — INSULIN LISPRO 100/ML
VIAL (ML) SUBCUTANEOUS AT BEDTIME
Qty: 0 | Refills: 0 | Status: DISCONTINUED | OUTPATIENT
Start: 2018-07-17 | End: 2018-07-23

## 2018-07-17 RX ORDER — GLUCAGON INJECTION, SOLUTION 0.5 MG/.1ML
1 INJECTION, SOLUTION SUBCUTANEOUS ONCE
Qty: 0 | Refills: 0 | Status: DISCONTINUED | OUTPATIENT
Start: 2018-07-17 | End: 2018-07-23

## 2018-07-17 RX ORDER — DEXTROSE 50 % IN WATER 50 %
15 SYRINGE (ML) INTRAVENOUS ONCE
Qty: 0 | Refills: 0 | Status: DISCONTINUED | OUTPATIENT
Start: 2018-07-17 | End: 2018-07-23

## 2018-07-17 RX ORDER — NITROGLYCERIN 6.5 MG
0.4 CAPSULE, EXTENDED RELEASE ORAL ONCE
Qty: 0 | Refills: 0 | Status: COMPLETED | OUTPATIENT
Start: 2018-07-17 | End: 2018-07-17

## 2018-07-17 RX ORDER — DEXTROSE 50 % IN WATER 50 %
12.5 SYRINGE (ML) INTRAVENOUS ONCE
Qty: 0 | Refills: 0 | Status: DISCONTINUED | OUTPATIENT
Start: 2018-07-17 | End: 2018-07-23

## 2018-07-17 RX ADMIN — Medication 0.4 MILLIGRAM(S): at 16:21

## 2018-07-17 RX ADMIN — Medication 15 MICROGRAM(S)/MIN: at 16:37

## 2018-07-17 RX ADMIN — Medication 1 INCH(S): at 19:04

## 2018-07-17 RX ADMIN — Medication 80 MILLIGRAM(S): at 16:37

## 2018-07-17 NOTE — H&P ADULT - ASSESSMENT
89M with diastolic HF, HTN, DM, dementia presents with SOB 89M with diastolic HF, HTN, HLC, Hypothyroid, DM2, dementia presents with worsening SOB/WHITLEY and LE edema a/w acute on chronic diastolic CHF.

## 2018-07-17 NOTE — ED PROVIDER NOTE - OBJECTIVE STATEMENT
Patient is 89 y M with PMH dementia, htn, CHF on lasix 40 presenting with worsening SOB x 2 days  Had hospitalization in June for CHF    PMD:  ROS: Denies fever, palpitations, chills, recent sickness, HA, vision changes, cough, chest pain, abdominal pain, n/v/d/c, dysuria, hematuria, rash, new joint aches, sick contacts, and recent travel. Patient is 89 y M with PMH dementia, htn, CHF on lasix 40 presenting with worsening SOB x 2 days per wife at bedside, has been compliant with meds. Patient denies discomfort.   Had hospitalization in June for CHF    PMD:  ROS: Denies fever, palpitations, chills, recent sickness, HA, vision changes, cough, chest pain, abdominal pain, n/v/d/c, dysuria, hematuria, rash, new joint aches, sick contacts, and recent travel. Patient is 89 y M with PMH dementia, htn, CHF on lasix 40 presenting with worsening SOB x 2 days per wife at bedside, has been compliant with meds. Patient denies discomfort.   Had hospitalization in June for CHF    PMD: amparo FLOWER: Denies fever, palpitations, chills, recent sickness, HA, vision changes, cough, chest pain, abdominal pain, n/v/d/c, dysuria, hematuria, rash, new joint aches, sick contacts, and recent travel.

## 2018-07-17 NOTE — H&P ADULT - NSHPSOCIALHISTORY_GEN_ALL_CORE
lives with lives with wife, retired from [employer], former smoker 2ppd, quit 50 yrs ago, no alcohol , lives with wife, retired from [employer], former smoker 2ppd, quit 50 yrs ago, no alcohol, uses a walker or a cane to ambulate

## 2018-07-17 NOTE — H&P ADULT - PROBLEM SELECTOR PLAN 2
- will treat with BIPAP and diurese with Lasix 80 iv bid  - CCU/cardio eval appreciated  - tele monitor  - strict I&O's with daily rate  - not on ACE-I or ARB due to CKD

## 2018-07-17 NOTE — CONSULT NOTE ADULT - SUBJECTIVE AND OBJECTIVE BOX
Patient seen and evaluated at bedside    Chief Complaint: SOB    HPI:  89M with diastolic HF, HTN, DM, dementia presents with SOB. Patient has dementia and is on bipap so most of history was obtained by speaking with patient's wife. The patient has been having SOB for the last few days that gets worse with any type of exertion. The wife states that she has also noted abdominal distention and LE edema on her  for the same amount of time. Patient has been taking his home dose of lasix but notes decreased urine output the last few days. Denies fevers, chills, orthopnea, chest pain, palpitations.    In the ED patient was placed on bipap for increased WOB. He was found to be hypertensive in the 200s so was started on a nitro gtt. He was also given IV lasix 80 mg.    PMH:   Dementia  HTN (hypertension)  Diabetes      PSH:   History of hernia surgery      Medications:   nitroglycerin    2% Ointment 1 Inch(s) Transdermal Once      Allergies:  No Known Allergies      FAMILY HISTORY:  Family history of hypertension  Family history of stroke      Social History:  Smoking History: denies  Alcohol Use: denies    Review of Systems:  REVIEW OF SYSTEMS:  CONSTITUTIONAL: No weakness, fevers or chills  EYES/ENT: No visual changes;  No dysphagia  RESPIRATORY: +cough, shortness of breath  CARDIOVASCULAR: No chest pain or palpitations; +lower extremity edema  GASTROINTESTINAL: +abdominal distention   GENITOURINARY: No dysuria, frequency or hematuria  NEUROLOGICAL: No numbness or weakness  SKIN: No itching, burning, rashes, or lesions     Physical Exam:  T(F): 98 (07-17), Max: 98 (07-17)  HR: 71 (07-17) (56 - 82)  BP: 161/70 (07-17) (113/49 - 205/72)  RR: 18 (07-17)  SpO2: 99% (07-17)  GENERAL: No acute distress, well-developed  HEAD:  Atraumatic, Normocephalic  ENT: EOMI, JVD elevated, moist mucosa  CHEST/LUNG: Bibasilar crackles  HEART: Regular rate and rhythm; No murmurs, rubs, or gallops  ABDOMEN:Distended; Bowel sounds present  EXTREMITIES:  1+ BLE edema  NEUROLOGY: AAOx2, non-focal, cranial nerves intact  SKIN: Normal color, No rashes or lesions    Cardiovascular Diagnostic Testing:    Echo:  < from: Transthoracic Echocardiogram (02.17.18 @ 07:15) >  1. Normal Left Ventricular Systolic Function,  (EF = 55 to  60%)  2. Grade II diastolic dysfunction.    < end of copied text >    Labs: Personally reviewed                        11.1   12.8  )-----------( 203      ( 17 Jul 2018 16:36 )             33.7     07-17    138  |  97  |  33<H>  ----------------------------<  181<H>  3.7   |  23  |  1.70<H>    Ca    8.8      17 Jul 2018 16:36    TPro  7.7  /  Alb  4.4  /  TBili  0.5  /  DBili  x   /  AST  35  /  ALT  26  /  AlkPhos  164<H>  07-17    PT/INR - ( 17 Jul 2018 16:36 )   PT: 12.6 sec;   INR: 1.16 ratio       PTT - ( 17 Jul 2018 16:36 )  PTT:31.2 sec    Serum Pro-Brain Natriuretic Peptide: 7873 pg/mL (07-17 @ 16:36)

## 2018-07-17 NOTE — ED ADULT NURSE NOTE - OBJECTIVE STATEMENT
88 yo M arrived to the ed c/o sob; pt is a poor historian, continues to tell staff to "ask wife about hx"; wife reports pt has dementia but is oriented to self and place; disoriented to time; reports sob worsening x2 days; reports pt has been complaint with medications; O2 sat in triage 79%, pt placed on nonrebreather, O2 sat increased to 95%, pt then immediately placed on BIPAP; BL 18G IVs placed, C.M placed, EKG completed; pt improves with BIPAP; denies any pain or any other complaint

## 2018-07-17 NOTE — ED PROVIDER NOTE - MEDICAL DECISION MAKING DETAILS
arlin deshpande chf with 2 days acute sob no fever no pain - wheeze on ezm with jvd and pitting edema concerned for cardiac wheeze p[o cus reveal blines and bl pleural effusion , ivc plump -- bipap , lasix, ngt - consider ccu ekg and labs r/o ischemia needs admission

## 2018-07-17 NOTE — ED PROVIDER NOTE - PHYSICAL EXAMINATION
Gen: NAD, AOx2  Head: NCAT  HEENT: PERRL, oral mucosa moist, normal conjunctiva  Lung: diminished bilaterally, crackles bilaterally, moderate resp distress with retractions and tachypnea  CV: rrr, no murmurs, Normal perfusion  Abd: soft, NTND, no CVA tenderness  MSK: bialteral LE pitting edema, no visible deformities  Neuro: No focal neurologic deficits  Skin: No rash   Psych: normal affect

## 2018-07-17 NOTE — CONSULT NOTE ADULT - ASSESSMENT
89M with diastolic HF, HTN, DM, CKD, dementia presents with SOB found to have acute on chronic diastolic heart failure and hypertensive urgency. Initally started on nitro gtt, which has since been discontinued. He is currently comfortable on bipap.    - does not need CCU care at this time  - cont IV lasix 80 mg BID for goal net neg 1-2L/24 hours  - start hydralazine 25 mg TID for afterload reduction  - cont home BB  - obtain EKG  - monitor on telemetry    Please call back if patient's condition worsens.

## 2018-07-17 NOTE — H&P ADULT - PROBLEM SELECTOR PLAN 4
- will treat with ISS and monitor fingersticks  - will hold off long acting insulin and adjust as needed

## 2018-07-17 NOTE — H&P ADULT - FAMILY HISTORY
Family history of stroke     Father  Still living? Unknown  Family history of hypertension, Age at diagnosis: Age Unknown

## 2018-07-17 NOTE — H&P ADULT - PROBLEM SELECTOR PLAN 1
RR 26, O2 sat 75% RA on presentation  - treated with IV lasix and BIPAP with improvement of symptoms  - will treat with BIPAP overnight and prn and Lasix 80 iv bid

## 2018-07-18 DIAGNOSIS — I10 ESSENTIAL (PRIMARY) HYPERTENSION: ICD-10-CM

## 2018-07-18 DIAGNOSIS — I48.91 UNSPECIFIED ATRIAL FIBRILLATION: ICD-10-CM

## 2018-07-18 DIAGNOSIS — Z29.9 ENCOUNTER FOR PROPHYLACTIC MEASURES, UNSPECIFIED: ICD-10-CM

## 2018-07-18 DIAGNOSIS — J96.01 ACUTE RESPIRATORY FAILURE WITH HYPOXIA: ICD-10-CM

## 2018-07-18 DIAGNOSIS — F03.90 UNSPECIFIED DEMENTIA WITHOUT BEHAVIORAL DISTURBANCE: ICD-10-CM

## 2018-07-18 DIAGNOSIS — E03.9 HYPOTHYROIDISM, UNSPECIFIED: ICD-10-CM

## 2018-07-18 DIAGNOSIS — I50.33 ACUTE ON CHRONIC DIASTOLIC (CONGESTIVE) HEART FAILURE: ICD-10-CM

## 2018-07-18 DIAGNOSIS — E11.22 TYPE 2 DIABETES MELLITUS WITH DIABETIC CHRONIC KIDNEY DISEASE: ICD-10-CM

## 2018-07-18 LAB
GLUCOSE BLDC GLUCOMTR-MCNC: 187 MG/DL — HIGH (ref 70–99)
GLUCOSE BLDC GLUCOMTR-MCNC: 217 MG/DL — HIGH (ref 70–99)
GLUCOSE BLDC GLUCOMTR-MCNC: 232 MG/DL — HIGH (ref 70–99)
GLUCOSE BLDC GLUCOMTR-MCNC: 86 MG/DL — SIGNIFICANT CHANGE UP (ref 70–99)
GLUCOSE BLDC GLUCOMTR-MCNC: 93 MG/DL — SIGNIFICANT CHANGE UP (ref 70–99)
HBA1C BLD-MCNC: 7.2 % — HIGH (ref 4–5.6)

## 2018-07-18 PROCEDURE — 99233 SBSQ HOSP IP/OBS HIGH 50: CPT

## 2018-07-18 PROCEDURE — 93010 ELECTROCARDIOGRAM REPORT: CPT

## 2018-07-18 RX ORDER — ASPIRIN/CALCIUM CARB/MAGNESIUM 324 MG
81 TABLET ORAL DAILY
Qty: 0 | Refills: 0 | Status: DISCONTINUED | OUTPATIENT
Start: 2018-07-18 | End: 2018-07-23

## 2018-07-18 RX ORDER — QUETIAPINE FUMARATE 200 MG/1
50 TABLET, FILM COATED ORAL ONCE
Qty: 0 | Refills: 0 | Status: COMPLETED | OUTPATIENT
Start: 2018-07-18 | End: 2018-07-18

## 2018-07-18 RX ORDER — DONEPEZIL HYDROCHLORIDE 10 MG/1
10 TABLET, FILM COATED ORAL AT BEDTIME
Qty: 0 | Refills: 0 | Status: DISCONTINUED | OUTPATIENT
Start: 2018-07-18 | End: 2018-07-23

## 2018-07-18 RX ORDER — PRAMIPEXOLE DIHYDROCHLORIDE 0.12 MG/1
3 TABLET ORAL
Qty: 0 | Refills: 0 | COMMUNITY

## 2018-07-18 RX ORDER — ATENOLOL 25 MG/1
50 TABLET ORAL DAILY
Qty: 0 | Refills: 0 | Status: DISCONTINUED | OUTPATIENT
Start: 2018-07-18 | End: 2018-07-23

## 2018-07-18 RX ORDER — INSULIN LISPRO 100/ML
5 VIAL (ML) SUBCUTANEOUS
Qty: 0 | Refills: 0 | COMMUNITY

## 2018-07-18 RX ORDER — DULOXETINE HYDROCHLORIDE 30 MG/1
30 CAPSULE, DELAYED RELEASE ORAL DAILY
Qty: 0 | Refills: 0 | Status: DISCONTINUED | OUTPATIENT
Start: 2018-07-18 | End: 2018-07-23

## 2018-07-18 RX ORDER — FUROSEMIDE 40 MG
80 TABLET ORAL
Qty: 0 | Refills: 0 | Status: DISCONTINUED | OUTPATIENT
Start: 2018-07-18 | End: 2018-07-20

## 2018-07-18 RX ORDER — AMLODIPINE BESYLATE 2.5 MG/1
10 TABLET ORAL DAILY
Qty: 0 | Refills: 0 | Status: DISCONTINUED | OUTPATIENT
Start: 2018-07-18 | End: 2018-07-23

## 2018-07-18 RX ORDER — LEVOTHYROXINE SODIUM 125 MCG
112 TABLET ORAL DAILY
Qty: 0 | Refills: 0 | Status: DISCONTINUED | OUTPATIENT
Start: 2018-07-18 | End: 2018-07-23

## 2018-07-18 RX ORDER — PRAMIPEXOLE DIHYDROCHLORIDE 0.12 MG/1
0.25 TABLET ORAL THREE TIMES A DAY
Qty: 0 | Refills: 0 | Status: DISCONTINUED | OUTPATIENT
Start: 2018-07-18 | End: 2018-07-23

## 2018-07-18 RX ORDER — QUETIAPINE FUMARATE 200 MG/1
1 TABLET, FILM COATED ORAL
Qty: 0 | Refills: 0 | COMMUNITY

## 2018-07-18 RX ORDER — QUETIAPINE FUMARATE 200 MG/1
50 TABLET, FILM COATED ORAL AT BEDTIME
Qty: 0 | Refills: 0 | Status: DISCONTINUED | OUTPATIENT
Start: 2018-07-18 | End: 2018-07-23

## 2018-07-18 RX ORDER — PRAMIPEXOLE DIHYDROCHLORIDE 0.12 MG/1
1 TABLET ORAL
Qty: 0 | Refills: 0 | COMMUNITY

## 2018-07-18 RX ORDER — SIMVASTATIN 20 MG/1
5 TABLET, FILM COATED ORAL AT BEDTIME
Qty: 0 | Refills: 0 | Status: DISCONTINUED | OUTPATIENT
Start: 2018-07-18 | End: 2018-07-23

## 2018-07-18 RX ORDER — APIXABAN 2.5 MG/1
2.5 TABLET, FILM COATED ORAL ONCE
Qty: 0 | Refills: 0 | Status: COMPLETED | OUTPATIENT
Start: 2018-07-18 | End: 2018-07-18

## 2018-07-18 RX ADMIN — Medication 80 MILLIGRAM(S): at 17:10

## 2018-07-18 RX ADMIN — QUETIAPINE FUMARATE 50 MILLIGRAM(S): 200 TABLET, FILM COATED ORAL at 01:02

## 2018-07-18 RX ADMIN — Medication 80 MILLIGRAM(S): at 06:31

## 2018-07-18 RX ADMIN — AMLODIPINE BESYLATE 10 MILLIGRAM(S): 2.5 TABLET ORAL at 06:31

## 2018-07-18 RX ADMIN — Medication 1 INCH(S): at 08:04

## 2018-07-18 RX ADMIN — PRAMIPEXOLE DIHYDROCHLORIDE 0.25 MILLIGRAM(S): 0.12 TABLET ORAL at 22:21

## 2018-07-18 RX ADMIN — APIXABAN 2.5 MILLIGRAM(S): 2.5 TABLET, FILM COATED ORAL at 11:17

## 2018-07-18 RX ADMIN — SIMVASTATIN 5 MILLIGRAM(S): 20 TABLET, FILM COATED ORAL at 22:21

## 2018-07-18 RX ADMIN — PRAMIPEXOLE DIHYDROCHLORIDE 0.25 MILLIGRAM(S): 0.12 TABLET ORAL at 06:31

## 2018-07-18 RX ADMIN — Medication 112 MICROGRAM(S): at 06:31

## 2018-07-18 RX ADMIN — QUETIAPINE FUMARATE 50 MILLIGRAM(S): 200 TABLET, FILM COATED ORAL at 22:20

## 2018-07-18 RX ADMIN — PRAMIPEXOLE DIHYDROCHLORIDE 0.25 MILLIGRAM(S): 0.12 TABLET ORAL at 15:28

## 2018-07-18 RX ADMIN — Medication 81 MILLIGRAM(S): at 11:17

## 2018-07-18 RX ADMIN — Medication 2: at 12:17

## 2018-07-18 RX ADMIN — ATENOLOL 50 MILLIGRAM(S): 25 TABLET ORAL at 06:31

## 2018-07-18 RX ADMIN — Medication 2: at 17:10

## 2018-07-18 RX ADMIN — DONEPEZIL HYDROCHLORIDE 10 MILLIGRAM(S): 10 TABLET, FILM COATED ORAL at 22:20

## 2018-07-18 RX ADMIN — DULOXETINE HYDROCHLORIDE 30 MILLIGRAM(S): 30 CAPSULE, DELAYED RELEASE ORAL at 11:17

## 2018-07-18 NOTE — PROGRESS NOTE ADULT - PROBLEM SELECTOR PLAN 2
- will diurese with Lasix 80 iv bid  - Monitor lytes  - tele monitor  - strict I&O's with daily rate  - not on ACE-I or ARB due to CKD

## 2018-07-18 NOTE — PROGRESS NOTE ADULT - ASSESSMENT
89M with diastolic HF, HTN, HLC, Hypothyroid, DM2, dementia presents with worsening SOB/WHITLEY and LE edema a/w acute on chronic diastolic CHF.

## 2018-07-18 NOTE — PROGRESS NOTE ADULT - PROBLEM SELECTOR PLAN 3
Converted to a fib overnight.   - c/w Atenolol for rate control.   - Cardiology consulted this am   - CHADSvasc 5. Would likely benefit from AC. WIll discuss with wife

## 2018-07-18 NOTE — PROVIDER CONTACT NOTE (OTHER) - ASSESSMENT
pt on iv lasix; converted from sr to afib this AM and back to SR; one dose of eliquis PO given; vss pt does not c/o chest pain, sob

## 2018-07-18 NOTE — PROGRESS NOTE ADULT - SUBJECTIVE AND OBJECTIVE BOX
Curt Bird MD  Division of Hospital Medicine  Pager [phone]      LUH CRAWFORD  89y  Male      Patient is a 89y old  Male who presents with a chief complaint of SOB (17 Jul 2018 21:54)      INTERVAL HPI/OVERNIGHT EVENTS:  Seen sitting in chair. Reports feeling better this am. Doesn't remember why he came to the hospital.       REVIEW OF SYSTEMS: 14 point ROS negative unless listed above    T(C): 36.6 (07-18-18 @ 11:15), Max: 36.9 (07-17-18 @ 19:54)  HR: 76 (07-18-18 @ 11:15) (56 - 82)  BP: 135/66 (07-18-18 @ 11:15) (113/49 - 205/72)  RR: 20 (07-18-18 @ 11:15) (16 - 27)  SpO2: 99% (07-18-18 @ 11:15) (75% - 100%)  Wt(kg): --Vital Signs Last 24 Hrs  T(C): 36.6 (18 Jul 2018 11:15), Max: 36.9 (17 Jul 2018 19:54)  T(F): 97.8 (18 Jul 2018 11:15), Max: 98.5 (17 Jul 2018 19:54)  HR: 76 (18 Jul 2018 11:15) (56 - 82)  BP: 135/66 (18 Jul 2018 11:15) (113/49 - 205/72)  BP(mean): 67 (17 Jul 2018 16:49) (67 - 82)  RR: 20 (18 Jul 2018 11:15) (16 - 27)  SpO2: 99% (18 Jul 2018 11:15) (75% - 100%)    PHYSICAL EXAM:  GENERAL: NAD, well-groomed, well-developed  HEAD:  Atraumatic, Normocephalic  NECK: Supple, No JVD  CHEST/LUNG: Bibasilar rales  HEART: Regular rate and rhythm; No murmurs, rubs, or gallops  ABDOMEN: Soft, Nontender, Nondistended; Bowel sounds present.    EXTREMITIES:  2+ Peripheral Pulses, No clubbing, cyanosis, 2+ edema  NERVOUS SYSTEM:  ; Motor Strength 5/5 B/L upper and lower extremities.  Sensory intact    Consultant(s) Notes Reviewed:  [x ] YES  [ ] NO  Care Discussed with Consultants/Other Providers [ x] YES  [ ] NO    LABS:                        11.1   12.8  )-----------( 203      ( 17 Jul 2018 16:36 )             33.7     07-17    138  |  97  |  33<H>  ----------------------------<  181<H>  3.7   |  23  |  1.70<H>    Ca    8.8      17 Jul 2018 16:36    TPro  7.7  /  Alb  4.4  /  TBili  0.5  /  DBili  x   /  AST  35  /  ALT  26  /  AlkPhos  164<H>  07-17    PT/INR - ( 17 Jul 2018 16:36 )   PT: 12.6 sec;   INR: 1.16 ratio         PTT - ( 17 Jul 2018 16:36 )  PTT:31.2 sec    CAPILLARY BLOOD GLUCOSE      POCT Blood Glucose.: 217 mg/dL (18 Jul 2018 11:37)  POCT Blood Glucose.: 86 mg/dL (18 Jul 2018 07:53)  POCT Blood Glucose.: 93 mg/dL (18 Jul 2018 00:10)            RADIOLOGY & ADDITIONAL TESTS:    Imaging Personally Reviewed:  [ x] YES  [ ] NO

## 2018-07-18 NOTE — CHART NOTE - NSCHARTNOTEFT_GEN_A_CORE
CC new onset AFIB    Notified by RN that patient converted to Afib while on tele. As per nurse the patient has been removing the BiPAP all night with episodes of confusion however patient more lucid compliant with BiPAP at time of assessment, also has Denies chest pain, shortness of breath, back pain, numbness, tingling, nausea vomiting, palpitations    Review of system  NO chest pain, SOB, WHITLEY, PND, orthopnea, palpitations, diaphoresis, lightheadedness, dizziness, syncope, increased lowr extremity edema, fever chills, malaise, myalgias, anorexia, weight changes ( loss or gain), nightsweats, generalized fatigue abdominal pain, N/V/C/D BRBPR, melena, urinary symptoms, cough, and wheezing.        Labs:                          11.1   12.8  )-----------( 203      ( 17 Jul 2018 16:36 )             33.7     07-17    138  |  97  |  33<H>  ----------------------------<  181<H>  3.7   |  23  |  1.70<H>    Ca    8.8      17 Jul 2018 16:36    TPro  7.7  /  Alb  4.4  /  TBili  0.5  /  DBili  x   /  AST  35  /  ALT  26  /  AlkPhos  164<H>  07-17        Radiology:  < from: Xray Chest 1 View AP/PA (07.17.18 @ 16:13) >    Bilateral reticular opacities in a perihilar distribution. The   differential includes edema versus infection. Correlate clinically and   follow to resolution.    No acute osseous finding.     IMPRESSION:    Lung opacities as described above..      < end of copied text >  Vital Signs Last 24 Hrs  T(C): 36.5 (18 Jul 2018 03:43), Max: 36.9 (17 Jul 2018 19:54)  T(F): 97.7 (18 Jul 2018 03:43), Max: 98.5 (17 Jul 2018 19:54)  HR: 66 (18 Jul 2018 03:43) (56 - 82)  BP: 118/64 (18 Jul 2018 03:43) (113/49 - 205/72)  BP(mean): 67 (17 Jul 2018 16:49) (67 - 82)  RR: 17 (18 Jul 2018 03:43) (16 - 27)  SpO2: 95% (18 Jul 2018 03:43) (75% - 100%)    Physical Exam:  General: WN/WD NAD non toxic in appearance  Neurology: A&Ox2-3, nonfocal, DELACRUZ x 4  Head:  Normocephalic, atraumatic  Respiratory: CTA B/L  CV: RRR, S1S2, no murmur pitting edema to bilateral lower ext  Abdominal: distended Soft, NT, ND no palpable mass  MSK: , + peripheral pulses, FROM all 4 extremity    MEDICATIONS  (STANDING):  amLODIPine   Tablet 10 milliGRAM(s) Oral daily  apixaban 2.5 milliGRAM(s) Oral once  aspirin enteric coated 81 milliGRAM(s) Oral daily  ATENolol  Tablet 50 milliGRAM(s) Oral daily  dextrose 5%. 1000 milliLiter(s) (50 mL/Hr) IV Continuous <Continuous>  dextrose 50% Injectable 12.5 Gram(s) IV Push once  dextrose 50% Injectable 25 Gram(s) IV Push once  dextrose 50% Injectable 25 Gram(s) IV Push once  donepezil 10 milliGRAM(s) Oral at bedtime  DULoxetine 30 milliGRAM(s) Oral daily  furosemide   Injectable 80 milliGRAM(s) IV Push two times a day  insulin lispro (HumaLOG) corrective regimen sliding scale   SubCutaneous three times a day before meals  insulin lispro (HumaLOG) corrective regimen sliding scale   SubCutaneous at bedtime  levothyroxine 112 MICROGram(s) Oral daily  pramipexole 0.25 milliGRAM(s) Oral three times a day  QUEtiapine 50 milliGRAM(s) Oral at bedtime  simvastatin 5 milliGRAM(s) Oral at bedtime    MEDICATIONS  (PRN):  dextrose 40% Gel 15 Gram(s) Oral once PRN Blood Glucose LESS THAN 70 milliGRAM(s)/deciliter  glucagon  Injectable 1 milliGRAM(s) IntraMuscular once PRN Glucose LESS THAN 70 milligrams/deciliter    PAST MEDICAL & SURGICAL HISTORY:  Dementia  HTN (hypertension)  Diabetes  History of hernia surgery    Assessment & Plan:  HPI:  89M with diastolic HF, HTN, HLC, hypothyroid, DM2, dementia presents with SOB for the past few days,  (17 Jul 2018 21:54) Now with new onset Afib    consider PAF, consider new onset Afib, consider Afib 2/2 to respiratory demand as he was removing BiPAP earlier in the shift     Plan  ekg shows Afib  Eliquis 2.5 times one dose (hold off on heparin as patient has periods of confusion (cards to make decision during consult)  c/w BB  Dr. brianne Florentino group consulted at 0530 spoke with SHASHI Amaya as this group seen patient on previous admission      Discussed above with Dr. James admitting hospitalist  Will endorse to primary day team, attending to follow    Muriel Ruvalcaba NP  [facility] [id number] CC new onset AFIB    Notified by RN that patient converted to Afib while on tele. As per nurse the patient has been removing the BiPAP all night with episodes of confusion however patient more lucid compliant with BiPAP at time of assessment, also has Denies chest pain, shortness of breath, back pain, numbness, tingling, nausea vomiting, palpitations    Review of system  NO chest pain, SOB, WHITLEY, PND, orthopnea, palpitations, diaphoresis, lightheadedness, dizziness, syncope, increased lowr extremity edema, fever chills, malaise, myalgias, anorexia, weight changes ( loss or gain), nightsweats, generalized fatigue abdominal pain, N/V/C/D BRBPR, melena, urinary symptoms, cough, and wheezing.        Labs:                          11.1   12.8  )-----------( 203      ( 17 Jul 2018 16:36 )             33.7     07-17    138  |  97  |  33<H>  ----------------------------<  181<H>  3.7   |  23  |  1.70<H>    Ca    8.8      17 Jul 2018 16:36    TPro  7.7  /  Alb  4.4  /  TBili  0.5  /  DBili  x   /  AST  35  /  ALT  26  /  AlkPhos  164<H>  07-17        Radiology:  < from: Xray Chest 1 View AP/PA (07.17.18 @ 16:13) >    Bilateral reticular opacities in a perihilar distribution. The   differential includes edema versus infection. Correlate clinically and   follow to resolution.    No acute osseous finding.     IMPRESSION:    Lung opacities as described above..      < end of copied text >  Vital Signs Last 24 Hrs  T(C): 36.5 (18 Jul 2018 03:43), Max: 36.9 (17 Jul 2018 19:54)  T(F): 97.7 (18 Jul 2018 03:43), Max: 98.5 (17 Jul 2018 19:54)  HR: 66 (18 Jul 2018 03:43) (56 - 82)  BP: 118/64 (18 Jul 2018 03:43) (113/49 - 205/72)  BP(mean): 67 (17 Jul 2018 16:49) (67 - 82)  RR: 17 (18 Jul 2018 03:43) (16 - 27)  SpO2: 95% (18 Jul 2018 03:43) (75% - 100%)    Physical Exam:  General: WN/WD NAD non toxic in appearance  Neurology: A&Ox2-3, nonfocal, DELACRUZ x 4  Head:  Normocephalic, atraumatic  Respiratory: CTA B/L  CV: RRR, S1S2, no murmur pitting edema to bilateral lower ext  Abdominal: distended Soft, NT, ND no palpable mass  MSK: , + peripheral pulses, FROM all 4 extremity    MEDICATIONS  (STANDING):  amLODIPine   Tablet 10 milliGRAM(s) Oral daily  apixaban 2.5 milliGRAM(s) Oral once  aspirin enteric coated 81 milliGRAM(s) Oral daily  ATENolol  Tablet 50 milliGRAM(s) Oral daily  dextrose 5%. 1000 milliLiter(s) (50 mL/Hr) IV Continuous <Continuous>  dextrose 50% Injectable 12.5 Gram(s) IV Push once  dextrose 50% Injectable 25 Gram(s) IV Push once  dextrose 50% Injectable 25 Gram(s) IV Push once  donepezil 10 milliGRAM(s) Oral at bedtime  DULoxetine 30 milliGRAM(s) Oral daily  furosemide   Injectable 80 milliGRAM(s) IV Push two times a day  insulin lispro (HumaLOG) corrective regimen sliding scale   SubCutaneous three times a day before meals  insulin lispro (HumaLOG) corrective regimen sliding scale   SubCutaneous at bedtime  levothyroxine 112 MICROGram(s) Oral daily  pramipexole 0.25 milliGRAM(s) Oral three times a day  QUEtiapine 50 milliGRAM(s) Oral at bedtime  simvastatin 5 milliGRAM(s) Oral at bedtime    MEDICATIONS  (PRN):  dextrose 40% Gel 15 Gram(s) Oral once PRN Blood Glucose LESS THAN 70 milliGRAM(s)/deciliter  glucagon  Injectable 1 milliGRAM(s) IntraMuscular once PRN Glucose LESS THAN 70 milligrams/deciliter    PAST MEDICAL & SURGICAL HISTORY:  Dementia  HTN (hypertension)  Diabetes  History of hernia surgery    Assessment & Plan:  HPI:  89M with diastolic HF, HTN, HLC, hypothyroid, DM2, dementia presents with SOB for the past few days,  (17 Jul 2018 21:54) Now with new onset Afib    consider PAF, consider new onset Afib, consider Afib 2/2 to respiratory demand as he was removing BiPAP earlier in the shift     Plan  ekg shows Afib  Eliquis 2.5 times one dose (hold off on heparin as patient has periods of confusion (cards to make decision during consult) ANNIEvasc 5  c/w BB  Dr. brianne Florentino group consulted at 0530 spoke with SHASHI Amaya as this group seen patient on previous admission      Discussed above with Dr. James admitting hospitalist  Will endorse to primary day team, attending to follow    Muriel Ruvalcaba NP  spectralink [phone]

## 2018-07-18 NOTE — PROVIDER CONTACT NOTE (OTHER) - ASSESSMENT
no c/o SOB, CP, palps, or resp distress, or difficulty breathing; tolerating bipap, sleeping comfortably

## 2018-07-18 NOTE — PROGRESS NOTE ADULT - PROBLEM SELECTOR PLAN 1
Pt presenetd with RR 26, O2 sat 75% RA on presentation was treated with IV lasix and BIPAP overbight with improvement of symptoms  - c/w lasix 80mg IV BID  - will c/w BIPAP prn

## 2018-07-18 NOTE — CONSULT NOTE ADULT - SUBJECTIVE AND OBJECTIVE BOX
Requesting Physician : Dr. Bird    Reason for Consultation: PAF, CHF    HISTORY OF PRESENT ILLNESS:  88 yo M with history of HTN, DM, dementia, HFpEF who is being seen for heart failure and PAF.  The pt. was admitted with orthopnea and LE edema and was found to have pulmonary edema on cxr.  He was briefly placed on BIPAP for heart failure and was diuresed with improvement in symptoms.  The patient was transferred to telemetry where he was found to have new onset AF which self converted to SR.  Cardiology consulted to further evaluate.  The patient denies chest pain, palpitations, syncope.  He reports improvement in dyspnea since admission.       PAST MEDICAL & SURGICAL HISTORY:  Dementia  HTN (hypertension)  Diabetes  History of hernia surgery          MEDICATIONS:  MEDICATIONS  (STANDING):  amLODIPine   Tablet 10 milliGRAM(s) Oral daily  aspirin enteric coated 81 milliGRAM(s) Oral daily  ATENolol  Tablet 50 milliGRAM(s) Oral daily  dextrose 5%. 1000 milliLiter(s) (50 mL/Hr) IV Continuous <Continuous>  dextrose 50% Injectable 12.5 Gram(s) IV Push once  dextrose 50% Injectable 25 Gram(s) IV Push once  dextrose 50% Injectable 25 Gram(s) IV Push once  donepezil 10 milliGRAM(s) Oral at bedtime  DULoxetine 30 milliGRAM(s) Oral daily  furosemide   Injectable 80 milliGRAM(s) IV Push two times a day  insulin lispro (HumaLOG) corrective regimen sliding scale   SubCutaneous three times a day before meals  insulin lispro (HumaLOG) corrective regimen sliding scale   SubCutaneous at bedtime  levothyroxine 112 MICROGram(s) Oral daily  pramipexole 0.25 milliGRAM(s) Oral three times a day  QUEtiapine 50 milliGRAM(s) Oral at bedtime  simvastatin 5 milliGRAM(s) Oral at bedtime      Allergies    No Known Allergies    Intolerances        FAMILY HISTORY:  Family history of hypertension (Father)  Family history of stroke    Non-contributary for premature coronary disease or sudden cardiac death    SOCIAL HISTORY:    [x ] Non-smoker  [ ] Smoker  [ ] Alcohol      REVIEW OF SYSTEMS:  [ ]chest pain  [ x ]shortness of breath  [  ]palpitations  [  ]syncope  [ ]near syncope [ ]upper extremity weakness   [ ] lower extremity weakness  [  ]diplopia  [  ]altered mental status   [  ]fevers  [ ]chills [ ]nausea  [ ]vomitting  [  ]dysphagia    [ ]abdominal pain  [ ]melena  [ ]BRBPR    [  ]epistaxis  [  ]rash    [x ]lower extremity edema        [x ] All others negative	  [ ] Unable to obtain    PHYSICAL EXAM:  T(C): 36.6 (07-18-18 @ 11:15), Max: 36.9 (07-17-18 @ 19:54)  HR: 76 (07-18-18 @ 11:15) (56 - 76)  BP: 135/66 (07-18-18 @ 11:15) (113/49 - 165/71)  RR: 20 (07-18-18 @ 11:15) (16 - 20)  SpO2: 99% (07-18-18 @ 11:15) (95% - 100%)  Wt(kg): --  I&O's Summary    17 Jul 2018 07:01  -  18 Jul 2018 07:00  --------------------------------------------------------  IN: 0 mL / OUT: 1855 mL / NET: -1855 mL    18 Jul 2018 07:01  -  18 Jul 2018 16:25  --------------------------------------------------------  IN: 480 mL / OUT: 1150 mL / NET: -670 mL          HEENT:   Normal oral mucosa, PERRL, EOMI	  Lymphatic: No lymphadenopathy , trace edema in LE bilaterally   Cardiovascular: Normal S1 S2, No JVD, No murmurs , Peripheral pulses palpable 2+ bilaterally  Respiratory: Lungs clear to auscultation	  Gastrointestinal:  Soft, Non-tender, + BS	  Skin: No rashes, No ecchymoses, No cyanosis, warm to touch          TELEMETRY: PAF; currently SR	    ECG:  AF 60's	  RADIOLOGY:  OTHER:     DIAGNOSTIC TESTING:  [ ] Echocardiogram: < from: Transthoracic Echocardiogram (02.17.18 @ 07:15) >  CONCLUSIONS:  1. Normal Left Ventricular Systolic Function,  (EF = 55 to  60%)  2. Grade II diastolic dysfunction.    < end of copied text >    [ ]  Catheterization:  [ ] Stress Test:    	  	  LABS:	 	    CARDIAC MARKERS:                              11.1   12.8  )-----------( 203      ( 17 Jul 2018 16:36 )             33.7     07-17    138  |  97  |  33<H>  ----------------------------<  181<H>  3.7   |  23  |  1.70<H>    Ca    8.8      17 Jul 2018 16:36    TPro  7.7  /  Alb  4.4  /  TBili  0.5  /  DBili  x   /  AST  35  /  ALT  26  /  AlkPhos  164<H>  07-17    proBNP: Serum Pro-Brain Natriuretic Peptide: 7873 pg/mL (07-17 @ 16:36)    Lipid Profile:   HgA1c: Hemoglobin A1C, Whole Blood: 7.2 % (07-18 @ 08:09)    TSH:     ASSESSMENT/PLAN: 	89yMale with HTN, DM, HFpEF, admitted with acute on chronic diastolic heart failure and new PAF.   -Pt. symptoms improved after IV diuresis  -continue with IV lasix to keep O > I  -check TTE  -given elevated chads-vasc score, would recommend lifelong ac if no contraindications  -can start hep gtt if no contraindications  -further workup pending above    Johnny Armstrong MD

## 2018-07-19 DIAGNOSIS — E87.6 HYPOKALEMIA: ICD-10-CM

## 2018-07-19 LAB
ANION GAP SERPL CALC-SCNC: 14 MMOL/L — SIGNIFICANT CHANGE UP (ref 5–17)
ANION GAP SERPL CALC-SCNC: 15 MMOL/L — SIGNIFICANT CHANGE UP (ref 5–17)
BUN SERPL-MCNC: 40 MG/DL — HIGH (ref 7–23)
BUN SERPL-MCNC: 40 MG/DL — HIGH (ref 7–23)
CALCIUM SERPL-MCNC: 8.5 MG/DL — SIGNIFICANT CHANGE UP (ref 8.4–10.5)
CALCIUM SERPL-MCNC: 8.9 MG/DL — SIGNIFICANT CHANGE UP (ref 8.4–10.5)
CHLORIDE SERPL-SCNC: 95 MMOL/L — LOW (ref 96–108)
CHLORIDE SERPL-SCNC: 96 MMOL/L — SIGNIFICANT CHANGE UP (ref 96–108)
CK MB CFR SERPL CALC: 7.8 NG/ML — HIGH (ref 0–6.7)
CK SERPL-CCNC: 551 U/L — HIGH (ref 30–200)
CO2 SERPL-SCNC: 32 MMOL/L — HIGH (ref 22–31)
CO2 SERPL-SCNC: 33 MMOL/L — HIGH (ref 22–31)
CREAT SERPL-MCNC: 1.91 MG/DL — HIGH (ref 0.5–1.3)
CREAT SERPL-MCNC: 1.95 MG/DL — HIGH (ref 0.5–1.3)
GLUCOSE BLDC GLUCOMTR-MCNC: 186 MG/DL — HIGH (ref 70–99)
GLUCOSE BLDC GLUCOMTR-MCNC: 226 MG/DL — HIGH (ref 70–99)
GLUCOSE BLDC GLUCOMTR-MCNC: 231 MG/DL — HIGH (ref 70–99)
GLUCOSE BLDC GLUCOMTR-MCNC: 247 MG/DL — HIGH (ref 70–99)
GLUCOSE SERPL-MCNC: 131 MG/DL — HIGH (ref 70–99)
GLUCOSE SERPL-MCNC: 247 MG/DL — HIGH (ref 70–99)
HCT VFR BLD CALC: 30.9 % — LOW (ref 39–50)
HGB BLD-MCNC: 10.4 G/DL — LOW (ref 13–17)
MAGNESIUM SERPL-MCNC: 1.8 MG/DL — SIGNIFICANT CHANGE UP (ref 1.6–2.6)
MCHC RBC-ENTMCNC: 31 PG — SIGNIFICANT CHANGE UP (ref 27–34)
MCHC RBC-ENTMCNC: 33.7 GM/DL — SIGNIFICANT CHANGE UP (ref 32–36)
MCV RBC AUTO: 92.2 FL — SIGNIFICANT CHANGE UP (ref 80–100)
PHOSPHATE SERPL-MCNC: 4.9 MG/DL — HIGH (ref 2.5–4.5)
PLATELET # BLD AUTO: 207 K/UL — SIGNIFICANT CHANGE UP (ref 150–400)
POTASSIUM SERPL-MCNC: 2.9 MMOL/L — CRITICAL LOW (ref 3.5–5.3)
POTASSIUM SERPL-MCNC: 3.7 MMOL/L — SIGNIFICANT CHANGE UP (ref 3.5–5.3)
POTASSIUM SERPL-SCNC: 2.9 MMOL/L — CRITICAL LOW (ref 3.5–5.3)
POTASSIUM SERPL-SCNC: 3.7 MMOL/L — SIGNIFICANT CHANGE UP (ref 3.5–5.3)
RBC # BLD: 3.35 M/UL — LOW (ref 4.2–5.8)
RBC # FLD: 15.1 % — HIGH (ref 10.3–14.5)
SODIUM SERPL-SCNC: 142 MMOL/L — SIGNIFICANT CHANGE UP (ref 135–145)
SODIUM SERPL-SCNC: 143 MMOL/L — SIGNIFICANT CHANGE UP (ref 135–145)
WBC # BLD: 8.72 K/UL — SIGNIFICANT CHANGE UP (ref 3.8–10.5)
WBC # FLD AUTO: 8.72 K/UL — SIGNIFICANT CHANGE UP (ref 3.8–10.5)

## 2018-07-19 PROCEDURE — 99233 SBSQ HOSP IP/OBS HIGH 50: CPT

## 2018-07-19 RX ORDER — POTASSIUM CHLORIDE 20 MEQ
10 PACKET (EA) ORAL ONCE
Qty: 0 | Refills: 0 | Status: COMPLETED | OUTPATIENT
Start: 2018-07-19 | End: 2018-07-19

## 2018-07-19 RX ORDER — POTASSIUM CHLORIDE 20 MEQ
20 PACKET (EA) ORAL ONCE
Qty: 0 | Refills: 0 | Status: DISCONTINUED | OUTPATIENT
Start: 2018-07-19 | End: 2018-07-19

## 2018-07-19 RX ORDER — DOCUSATE SODIUM 100 MG
100 CAPSULE ORAL THREE TIMES A DAY
Qty: 0 | Refills: 0 | Status: DISCONTINUED | OUTPATIENT
Start: 2018-07-19 | End: 2018-07-23

## 2018-07-19 RX ORDER — SENNA PLUS 8.6 MG/1
2 TABLET ORAL AT BEDTIME
Qty: 0 | Refills: 0 | Status: DISCONTINUED | OUTPATIENT
Start: 2018-07-19 | End: 2018-07-23

## 2018-07-19 RX ORDER — POTASSIUM CHLORIDE 20 MEQ
40 PACKET (EA) ORAL ONCE
Qty: 0 | Refills: 0 | Status: COMPLETED | OUTPATIENT
Start: 2018-07-19 | End: 2018-07-19

## 2018-07-19 RX ORDER — POTASSIUM CHLORIDE 20 MEQ
10 PACKET (EA) ORAL ONCE
Qty: 0 | Refills: 0 | Status: DISCONTINUED | OUTPATIENT
Start: 2018-07-19 | End: 2018-07-19

## 2018-07-19 RX ADMIN — PRAMIPEXOLE DIHYDROCHLORIDE 0.25 MILLIGRAM(S): 0.12 TABLET ORAL at 13:06

## 2018-07-19 RX ADMIN — ATENOLOL 50 MILLIGRAM(S): 25 TABLET ORAL at 05:21

## 2018-07-19 RX ADMIN — Medication 100 MILLIGRAM(S): at 22:26

## 2018-07-19 RX ADMIN — PRAMIPEXOLE DIHYDROCHLORIDE 0.25 MILLIGRAM(S): 0.12 TABLET ORAL at 21:31

## 2018-07-19 RX ADMIN — Medication 1: at 17:23

## 2018-07-19 RX ADMIN — Medication 112 MICROGRAM(S): at 05:21

## 2018-07-19 RX ADMIN — Medication 2: at 12:21

## 2018-07-19 RX ADMIN — Medication 81 MILLIGRAM(S): at 13:06

## 2018-07-19 RX ADMIN — Medication 40 MILLIEQUIVALENT(S): at 06:52

## 2018-07-19 RX ADMIN — Medication 80 MILLIGRAM(S): at 05:21

## 2018-07-19 RX ADMIN — AMLODIPINE BESYLATE 10 MILLIGRAM(S): 2.5 TABLET ORAL at 05:21

## 2018-07-19 RX ADMIN — SENNA PLUS 2 TABLET(S): 8.6 TABLET ORAL at 22:26

## 2018-07-19 RX ADMIN — QUETIAPINE FUMARATE 50 MILLIGRAM(S): 200 TABLET, FILM COATED ORAL at 21:30

## 2018-07-19 RX ADMIN — DONEPEZIL HYDROCHLORIDE 10 MILLIGRAM(S): 10 TABLET, FILM COATED ORAL at 21:31

## 2018-07-19 RX ADMIN — DULOXETINE HYDROCHLORIDE 30 MILLIGRAM(S): 30 CAPSULE, DELAYED RELEASE ORAL at 13:06

## 2018-07-19 RX ADMIN — Medication 80 MILLIGRAM(S): at 18:00

## 2018-07-19 RX ADMIN — SIMVASTATIN 5 MILLIGRAM(S): 20 TABLET, FILM COATED ORAL at 21:30

## 2018-07-19 RX ADMIN — Medication 2: at 08:28

## 2018-07-19 RX ADMIN — PRAMIPEXOLE DIHYDROCHLORIDE 0.25 MILLIGRAM(S): 0.12 TABLET ORAL at 05:21

## 2018-07-19 NOTE — PROGRESS NOTE ADULT - PROBLEM SELECTOR PLAN 3
Converted to a fib, now in NSR  - c/w Atenolol for rate control.   - Cardiology consulted this am   - CHADSvasc 5. Will discuss with wife risk/benefits of AC Converted to a fib, now in NSR  - c/w Atenolol for rate control.   - Cardiology consulted  - CHADSvasc 5. Will discussed risk/benefit ratio of anticoagulation. Wife states that pt tends to fall frequently and is worried about risk of bleeding if AC is initiated. Would like to refrain from initiating at this time.

## 2018-07-19 NOTE — PROGRESS NOTE ADULT - PROBLEM SELECTOR PLAN 5
K 2.9 this am. Likely 2/2 aggressive diuresis. Repleted  - Would check additional BMP this afternoon given continued diuretic therapy

## 2018-07-19 NOTE — PROGRESS NOTE ADULT - SUBJECTIVE AND OBJECTIVE BOX
Curt Bird MD  Division of Hospital Medicine  Pager [phone]      LUH CRAWFORD  89y  Male      Patient is a 89y old  Male who presents with a chief complaint of SOB (17 Jul 2018 21:54)      INTERVAL HPI/OVERNIGHT EVENTS:  Seen at bedside. Confused, doesn't remember why he came to the hospital. Wants to go home. Denies any complaints      REVIEW OF SYSTEMS: 14 point ROS negative unless listed above    T(C): 36.4 (07-19-18 @ 11:27), Max: 36.5 (07-18-18 @ 20:31)  HR: 56 (07-19-18 @ 11:27) (56 - 66)  BP: 172/87 (07-19-18 @ 11:27) (131/64 - 174/74)  RR: 18 (07-19-18 @ 11:27) (18 - 20)  SpO2: 98% (07-19-18 @ 11:27) (96% - 98%)  Wt(kg): --Vital Signs Last 24 Hrs  T(C): 36.4 (19 Jul 2018 11:27), Max: 36.5 (18 Jul 2018 20:31)  T(F): 97.5 (19 Jul 2018 11:27), Max: 97.7 (18 Jul 2018 20:31)  HR: 56 (19 Jul 2018 11:27) (56 - 66)  BP: 172/87 (19 Jul 2018 11:27) (131/64 - 174/74)  BP(mean): --  RR: 18 (19 Jul 2018 11:27) (18 - 20)  SpO2: 98% (19 Jul 2018 11:27) (96% - 98%)    PHYSICAL EXAM:  GENERAL: NAD, well-groomed, well-developed  HEAD:  Atraumatic, Normocephalic  NECK: Supple, No JVD  CHEST/LUNG: Minimal bibasilar rales  HEART: Regular rate and rhythm; No murmurs, rubs, or gallops  ABDOMEN: Soft, Nontender, Nondistended; Bowel sounds present.    EXTREMITIES:  2+ Peripheral Pulses, No clubbing, cyanosis, trace edema  NERVOUS SYSTEM:  ; Motor Strength 5/5 B/L upper and lower extremities.  Sensory intact    Consultant(s) Notes Reviewed:  [x ] YES  [ ] NO  Care Discussed with Consultants/Other Providers [ x] YES  [ ] NO    LABS:                        10.4   8.72  )-----------( 207      ( 19 Jul 2018 08:17 )             30.9     07-19    143  |  96  |  40<H>  ----------------------------<  131<H>  2.9<LL>   |  32<H>  |  1.95<H>    Ca    8.5      19 Jul 2018 05:39  Phos  4.9     07-19  Mg     1.8     07-19    TPro  7.7  /  Alb  4.4  /  TBili  0.5  /  DBili  x   /  AST  35  /  ALT  26  /  AlkPhos  164<H>  07-17    PT/INR - ( 17 Jul 2018 16:36 )   PT: 12.6 sec;   INR: 1.16 ratio         PTT - ( 17 Jul 2018 16:36 )  PTT:31.2 sec    CAPILLARY BLOOD GLUCOSE      POCT Blood Glucose.: 247 mg/dL (19 Jul 2018 11:38)  POCT Blood Glucose.: 226 mg/dL (19 Jul 2018 07:40)  POCT Blood Glucose.: 187 mg/dL (18 Jul 2018 21:50)  POCT Blood Glucose.: 232 mg/dL (18 Jul 2018 16:34)            RADIOLOGY & ADDITIONAL TESTS:    Imaging Personally Reviewed:  [ ] YES  [ ] NO

## 2018-07-19 NOTE — PHYSICAL THERAPY INITIAL EVALUATION ADULT - PERTINENT HX OF CURRENT PROBLEM, REHAB EVAL
89M p/w SOB for the past 3 days, started after having to strain for constipation. Pt had a hard time sleeping the night prior, unable to sleep with 2 pillows, sat up most of the night, and still had trouble moving around in the house earlier today a/w abd distention and LE edema, wife called PMD, who advised to come to ED for further evaluation.  Denied sick contact, fever/chill, dysuria, chest pain/palpitation or syncope.  Stated to be compliant with meds and diet.

## 2018-07-19 NOTE — PROGRESS NOTE ADULT - SUBJECTIVE AND OBJECTIVE BOX
SUBJECTIVE: Patient with no anginal chest pain or shortness of breath  Dyspnea improved   Diuresed 1.8 L overnight.       MEDICATIONS  (STANDING):  amLODIPine   Tablet 10 milliGRAM(s) Oral daily  aspirin enteric coated 81 milliGRAM(s) Oral daily  ATENolol  Tablet 50 milliGRAM(s) Oral daily  donepezil 10 milliGRAM(s) Oral at bedtime  DULoxetine 30 milliGRAM(s) Oral daily  furosemide   Injectable 80 milliGRAM(s) IV Push two times a day  insulin lispro (HumaLOG) corrective regimen sliding scale   SubCutaneous three times a day before meals  insulin lispro (HumaLOG) corrective regimen sliding scale   SubCutaneous at bedtime  levothyroxine 112 MICROGram(s) Oral daily  pramipexole 0.25 milliGRAM(s) Oral three times a day  QUEtiapine 50 milliGRAM(s) Oral at bedtime  simvastatin 5 milliGRAM(s) Oral at bedtime    MEDICATIONS  (PRN):  dextrose 40% Gel 15 Gram(s) Oral once PRN Blood Glucose LESS THAN 70 milliGRAM(s)/deciliter  glucagon  Injectable 1 milliGRAM(s) IntraMuscular once PRN Glucose LESS THAN 70 milligrams/deciliter      LABS:                        10.4   8.72  )-----------( 207      ( 19 Jul 2018 08:17 )             30.9       07-19    142  |  95<L>  |  40<H>  ----------------------------<  247<H>  3.7   |  33<H>  |  1.91<H>    Ca    8.9      19 Jul 2018 14:12  Phos  4.9     07-19  Mg     1.8     07-19    TPro  7.7  /  Alb  4.4  /  TBili  0.5  /  DBili  x   /  AST  35  /  ALT  26  /  AlkPhos  164<H>  07-17    Serum Pro-Brain Natriuretic Peptide: 7873 pg/mL (07-17 @ 16:36)      PHYSICAL EXAM:  Vital Signs Last 24 Hrs  T(C): 36.4 (19 Jul 2018 11:27), Max: 36.5 (18 Jul 2018 20:31)  T(F): 97.5 (19 Jul 2018 11:27), Max: 97.7 (18 Jul 2018 20:31)  HR: 56 (19 Jul 2018 11:27) (56 - 66)  BP: 172/87 (19 Jul 2018 11:27) (131/64 - 174/74)  BP(mean): --  RR: 18 (19 Jul 2018 11:27) (18 - 20)  SpO2: 98% (19 Jul 2018 11:27) (96% - 98%)    HEENT: Normal Oral mucosa, PERRL, EOMI  Lymphatic: No obvious lymphadenopathy, No edema  Cardiovascular: Normal S1S2, No JVD, 1/6 KHADAR, Peripheral pulses palpable 2+ B/L  Respiratory: diminished at bases normal effort  Gastrointestinal: Abdomen soft, ND, NT, +BS  Skin: Warm, dry, intact. No cyanosis, No rash.  Musculoskeletal: Normal ROM, normal strength  Psychiatric: Appropriate Mood and Affect      DIAGNOSTIC DATA    RADIOLOGY:   < from: Xray Chest 1 View AP/PA (07.17.18 @ 16:13) >  The mediastinal cardiac silhouette is unremarkable.    Bilateral reticular opacities in a perihilar distribution. The   differential includes edema versus infection. Correlate clinically and   follow to resolution.    No acute osseous finding.       < from: Transthoracic Echocardiogram (02.17.18 @ 07:15) >  OBSERVATIONS:  Mitral Valve: Normal mitral valve. Trace mitral  regurgitation.  Aortic Root: Aortic Root: 3.3 cm.    Aortic Valve: Normal trileaflet aortic valve. Trace aortic  regurgitation.  Left Ventricle: Normal Left Ventricular Systolic Function,  (EF = 55 to 60%) Normal left ventricular internal  dimensions and wall thicknesses. Grade II diastolic  dysfunction.  Right Heart: Normal right atrium. Normal right ventricular  size and function. There is trace tricuspidregurgitation.  There is trace pulmonic regurgitation.  Pericardium/PleuraNormal pericardium with no pericardial  effusion.        ASSESSMENT AND PLAN:    88 yo M with history of HTN, DM, CKD, dementia, HFpEF who is being seen for heart failure and PAF.  The pt. was admitted with orthopnea and LE edema and was found to have pulmonary edema on cxr.  He was briefly placed on BIPAP  and was diuresed with improvement in symptoms.  The patient was transferred to telemetry where he was found to have new onset AF which self converted to SR.  Cardiology consulted to further evaluate.  The patient denies chest pain, palpitations, syncope.  He reports improvement in dyspnea since admission.  Historically he has normal LV function with no prior ischemic evaluation.     - Acute on chronic diastolic CHF      - diuresing well (-1.8 L )      - c/w IV Lasix   - repeat TET pending  - given elevated chads-vasc score, would recommend lifelong ac if no contraindications      -can start hep gtt if no contraindications and decide on PO AC when echo resulted   -further workup pending above

## 2018-07-19 NOTE — PROGRESS NOTE ADULT - PROBLEM SELECTOR PLAN 1
Pt presented with RR 26, O2 sat 75% RA on presentation was treated with IV lasix and BIPAP overnight with improvement of symptoms  - c/w lasix 80mg IV BID  - On NC 2L from 5L yesterday. Titrate off oxygen as tolerated

## 2018-07-19 NOTE — PROGRESS NOTE ADULT - PROBLEM SELECTOR PLAN 2
- will c/w Lasix 80 iv bid. Neg 1.8L over last 24 hours. Nearing euvolemia. Possible transition to PO tomorrow.   - Monitor lytes  - tele monitor  - strict I&O's with daily rate  - not on ACE-I or ARB due to CKD

## 2018-07-19 NOTE — PHYSICAL THERAPY INITIAL EVALUATION ADULT - ADDITIONAL COMMENTS
Pt is a poor historian; As per care coordination notes pt lives with spouse and has 7days/week home health aide. Pt ambulated with RW.

## 2018-07-20 ENCOUNTER — TRANSCRIPTION ENCOUNTER (OUTPATIENT)
Age: 83
End: 2018-07-20

## 2018-07-20 LAB
ANION GAP SERPL CALC-SCNC: 18 MMOL/L — HIGH (ref 5–17)
BUN SERPL-MCNC: 46 MG/DL — HIGH (ref 7–23)
CALCIUM SERPL-MCNC: 8.3 MG/DL — LOW (ref 8.4–10.5)
CHLORIDE SERPL-SCNC: 92 MMOL/L — LOW (ref 96–108)
CK MB BLD-MCNC: 1.8 % — SIGNIFICANT CHANGE UP (ref 0–3.5)
CK MB CFR SERPL CALC: 6.4 NG/ML — SIGNIFICANT CHANGE UP (ref 0–6.7)
CK SERPL-CCNC: 354 U/L — HIGH (ref 30–200)
CO2 SERPL-SCNC: 30 MMOL/L — SIGNIFICANT CHANGE UP (ref 22–31)
CREAT SERPL-MCNC: 1.99 MG/DL — HIGH (ref 0.5–1.3)
GLUCOSE BLDC GLUCOMTR-MCNC: 193 MG/DL — HIGH (ref 70–99)
GLUCOSE BLDC GLUCOMTR-MCNC: 208 MG/DL — HIGH (ref 70–99)
GLUCOSE BLDC GLUCOMTR-MCNC: 228 MG/DL — HIGH (ref 70–99)
GLUCOSE BLDC GLUCOMTR-MCNC: 305 MG/DL — HIGH (ref 70–99)
GLUCOSE BLDC GLUCOMTR-MCNC: 319 MG/DL — HIGH (ref 70–99)
GLUCOSE SERPL-MCNC: 194 MG/DL — HIGH (ref 70–99)
HCT VFR BLD CALC: 33.7 % — LOW (ref 39–50)
HGB BLD-MCNC: 11.4 G/DL — LOW (ref 13–17)
MAGNESIUM SERPL-MCNC: 1.8 MG/DL — SIGNIFICANT CHANGE UP (ref 1.6–2.6)
MCHC RBC-ENTMCNC: 31.4 PG — SIGNIFICANT CHANGE UP (ref 27–34)
MCHC RBC-ENTMCNC: 33.8 GM/DL — SIGNIFICANT CHANGE UP (ref 32–36)
MCV RBC AUTO: 92.8 FL — SIGNIFICANT CHANGE UP (ref 80–100)
PLATELET # BLD AUTO: 244 K/UL — SIGNIFICANT CHANGE UP (ref 150–400)
POTASSIUM SERPL-MCNC: 3.4 MMOL/L — LOW (ref 3.5–5.3)
POTASSIUM SERPL-SCNC: 3.4 MMOL/L — LOW (ref 3.5–5.3)
RBC # BLD: 3.63 M/UL — LOW (ref 4.2–5.8)
RBC # FLD: 14.9 % — HIGH (ref 10.3–14.5)
SODIUM SERPL-SCNC: 140 MMOL/L — SIGNIFICANT CHANGE UP (ref 135–145)
WBC # BLD: 9.65 K/UL — SIGNIFICANT CHANGE UP (ref 3.8–10.5)
WBC # FLD AUTO: 9.65 K/UL — SIGNIFICANT CHANGE UP (ref 3.8–10.5)

## 2018-07-20 PROCEDURE — 93306 TTE W/DOPPLER COMPLETE: CPT | Mod: 26

## 2018-07-20 PROCEDURE — 99233 SBSQ HOSP IP/OBS HIGH 50: CPT

## 2018-07-20 PROCEDURE — 93970 EXTREMITY STUDY: CPT | Mod: 26

## 2018-07-20 RX ORDER — FUROSEMIDE 40 MG
40 TABLET ORAL
Qty: 0 | Refills: 0 | Status: DISCONTINUED | OUTPATIENT
Start: 2018-07-20 | End: 2018-07-20

## 2018-07-20 RX ORDER — FUROSEMIDE 40 MG
80 TABLET ORAL
Qty: 0 | Refills: 0 | Status: DISCONTINUED | OUTPATIENT
Start: 2018-07-20 | End: 2018-07-22

## 2018-07-20 RX ORDER — POTASSIUM CHLORIDE 20 MEQ
40 PACKET (EA) ORAL ONCE
Qty: 0 | Refills: 0 | Status: COMPLETED | OUTPATIENT
Start: 2018-07-20 | End: 2018-07-20

## 2018-07-20 RX ADMIN — ATENOLOL 50 MILLIGRAM(S): 25 TABLET ORAL at 05:30

## 2018-07-20 RX ADMIN — SIMVASTATIN 5 MILLIGRAM(S): 20 TABLET, FILM COATED ORAL at 22:35

## 2018-07-20 RX ADMIN — Medication 1: at 12:23

## 2018-07-20 RX ADMIN — Medication 4: at 17:15

## 2018-07-20 RX ADMIN — Medication 2: at 08:39

## 2018-07-20 RX ADMIN — DONEPEZIL HYDROCHLORIDE 10 MILLIGRAM(S): 10 TABLET, FILM COATED ORAL at 22:35

## 2018-07-20 RX ADMIN — Medication 100 MILLIGRAM(S): at 22:35

## 2018-07-20 RX ADMIN — Medication 81 MILLIGRAM(S): at 12:25

## 2018-07-20 RX ADMIN — Medication 100 MILLIGRAM(S): at 05:33

## 2018-07-20 RX ADMIN — Medication 80 MILLIGRAM(S): at 17:16

## 2018-07-20 RX ADMIN — DULOXETINE HYDROCHLORIDE 30 MILLIGRAM(S): 30 CAPSULE, DELAYED RELEASE ORAL at 12:25

## 2018-07-20 RX ADMIN — Medication 112 MICROGRAM(S): at 05:30

## 2018-07-20 RX ADMIN — PRAMIPEXOLE DIHYDROCHLORIDE 0.25 MILLIGRAM(S): 0.12 TABLET ORAL at 22:35

## 2018-07-20 RX ADMIN — QUETIAPINE FUMARATE 50 MILLIGRAM(S): 200 TABLET, FILM COATED ORAL at 22:35

## 2018-07-20 RX ADMIN — PRAMIPEXOLE DIHYDROCHLORIDE 0.25 MILLIGRAM(S): 0.12 TABLET ORAL at 12:48

## 2018-07-20 RX ADMIN — Medication 40 MILLIEQUIVALENT(S): at 12:54

## 2018-07-20 RX ADMIN — AMLODIPINE BESYLATE 10 MILLIGRAM(S): 2.5 TABLET ORAL at 05:30

## 2018-07-20 RX ADMIN — Medication 80 MILLIGRAM(S): at 05:30

## 2018-07-20 RX ADMIN — SENNA PLUS 2 TABLET(S): 8.6 TABLET ORAL at 22:35

## 2018-07-20 RX ADMIN — Medication 100 MILLIGRAM(S): at 12:47

## 2018-07-20 RX ADMIN — PRAMIPEXOLE DIHYDROCHLORIDE 0.25 MILLIGRAM(S): 0.12 TABLET ORAL at 05:30

## 2018-07-20 NOTE — DISCHARGE NOTE ADULT - CARE PROVIDERS DIRECT ADDRESSES
[email or web address]

## 2018-07-20 NOTE — DISCHARGE NOTE ADULT - ADDITIONAL INSTRUCTIONS
Follow up with cardiology with 10days of discharge Follow up with PMD Dr. Schrader as outpt after discharge from rehab  Follow up with card

## 2018-07-20 NOTE — PROGRESS NOTE ADULT - PROBLEM SELECTOR PLAN 4
- BP better controlled. s/p nitro ggt   - c/w Norvasc 10, Atenolol 50. If BP remains elevated would transition from Atenolol to Coreg 12.5mg for better BP control

## 2018-07-20 NOTE — PROGRESS NOTE ADULT - PROBLEM SELECTOR PLAN 1
Pt presented with RR 26, O2 sat 75% RA on presentation was treated with IV lasix and BIPAP with improvement of symptoms  - On lasix 80mg IV BID. Appears euvolemic will transition to PO today  - Remains on NC 2L. Titrate off oxygen as tolerated Pt presented with RR 26, O2 sat 75% RA on presentation was treated with IV lasix and BIPAP with improvement of symptoms  - On lasix 80mg IV BID. Nearing euvolemia. Possible transition to PO tomorrow  - Remains on NC 2L. Titrate off oxygen as tolerated

## 2018-07-20 NOTE — DISCHARGE NOTE ADULT - SECONDARY DIAGNOSIS.
Acute respiratory failure with hypoxia Acute on chronic diastolic (congestive) heart failure Acute encephalopathy Type 2 diabetes mellitus with stage 3 chronic kidney disease, with long-term current use of insulin Dementia Atrial fibrillation, unspecified type Essential hypertension

## 2018-07-20 NOTE — DISCHARGE NOTE ADULT - PATIENT PORTAL LINK FT
You can access the [facility] Patient Portal, offered by [facility], by registering with the following website: [email or web address]

## 2018-07-20 NOTE — DISCHARGE NOTE ADULT - CARE PLAN
Principal Discharge DX:	Acute pulmonary edema  Goal:	IMPROVED  Assessment and plan of treatment:	Continue lasix 40mg BID, follow up with cardiology  Secondary Diagnosis:	Acute respiratory failure with hypoxia  Secondary Diagnosis:	Acute on chronic diastolic (congestive) heart failure  Secondary Diagnosis:	Acute encephalopathy  Secondary Diagnosis:	Type 2 diabetes mellitus with stage 3 chronic kidney disease, with long-term current use of insulin  Goal:	continue home meds  Secondary Diagnosis:	Dementia  Goal:	continue home meds Principal Discharge DX:	Acute on chronic diastolic (congestive) heart failure  Goal:	IMPROVED  Assessment and plan of treatment:	You were given additional diuretics with improvement of your symptoms.   Monitor your salt and fluid intake. COnt with Lasix as prescribed.  Follow up with cardiology and PMD.  Monitor your weight closely  Secondary Diagnosis:	Acute encephalopathy  Assessment and plan of treatment:	Resolved. Likely was related to medical condition.  Secondary Diagnosis:	Atrial fibrillation, unspecified type  Assessment and plan of treatment:	You had an episode of irregular heart rhythm while inpt but now back to normal.   Decision to start blood thinner was discussed with you/family but not started due to risk for bleeding and fall. Risk of having a stroke was discussed.  Secondary Diagnosis:	Essential hypertension  Assessment and plan of treatment:	Cont with current BP meds  Secondary Diagnosis:	Type 2 diabetes mellitus with stage 3 chronic kidney disease, with long-term current use of insulin  Goal:	continue home meds  Assessment and plan of treatment:	Cont with Home meds

## 2018-07-20 NOTE — PROGRESS NOTE ADULT - SUBJECTIVE AND OBJECTIVE BOX
SUBJECTIVE: Patient with no anginal chest pain or shortness of breath  Dyspnea improved   Diuresed 300cc so far today          MEDICATIONS  (STANDING):  amLODIPine   Tablet 10 milliGRAM(s) Oral daily  aspirin enteric coated 81 milliGRAM(s) Oral daily  ATENolol  Tablet 50 milliGRAM(s) Oral daily  dextrose 5%. 1000 milliLiter(s) (50 mL/Hr) IV Continuous <Continuous>  dextrose 50% Injectable 12.5 Gram(s) IV Push once  dextrose 50% Injectable 25 Gram(s) IV Push once  dextrose 50% Injectable 25 Gram(s) IV Push once  docusate sodium 100 milliGRAM(s) Oral three times a day  donepezil 10 milliGRAM(s) Oral at bedtime  DULoxetine 30 milliGRAM(s) Oral daily  furosemide    Tablet 40 milliGRAM(s) Oral two times a day  insulin lispro (HumaLOG) corrective regimen sliding scale   SubCutaneous three times a day before meals  insulin lispro (HumaLOG) corrective regimen sliding scale   SubCutaneous at bedtime  levothyroxine 112 MICROGram(s) Oral daily  pramipexole 0.25 milliGRAM(s) Oral three times a day  QUEtiapine 50 milliGRAM(s) Oral at bedtime  senna 2 Tablet(s) Oral at bedtime  simvastatin 5 milliGRAM(s) Oral at bedtime    MEDICATIONS  (PRN):  dextrose 40% Gel 15 Gram(s) Oral once PRN Blood Glucose LESS THAN 70 milliGRAM(s)/deciliter  glucagon  Injectable 1 milliGRAM(s) IntraMuscular once PRN Glucose LESS THAN 70 milligrams/deciliter      LABS:                        11.4   9.65  )-----------( 244      ( 20 Jul 2018 08:12 )             33.7     Hemoglobin: 11.4 g/dL (07-20 @ 08:12)  Hemoglobin: 10.4 g/dL (07-19 @ 08:17)  Hemoglobin: 11.1 g/dL (07-17 @ 16:36)    07-20    140  |  92<L>  |  46<H>  ----------------------------<  194<H>  3.4<L>   |  30  |  1.99<H>    Ca    8.3<L>      20 Jul 2018 06:09  Phos  4.9     07-19  Mg     1.8     07-20      Creatinine Trend: 1.99<--, 1.91<--, 1.95<--, 1.70<--     CARDIAC MARKERS ( 20 Jul 2018 06:09 )  x     / x     / 354 U/L / x     / 6.4 ng/mL  CARDIAC MARKERS ( 19 Jul 2018 14:12 )  x     / x     / 551 U/L / x     / 7.8 ng/mL        PHYSICAL EXAM  Vital Signs Last 24 Hrs  T(C): 36.2 (20 Jul 2018 11:47), Max: 36.8 (19 Jul 2018 20:54)  T(F): 97.2 (20 Jul 2018 11:47), Max: 98.2 (19 Jul 2018 20:54)  HR: 56 (20 Jul 2018 11:47) (56 - 62)  BP: 144/63 (20 Jul 2018 11:47) (144/63 - 184/70)  BP(mean): --  RR: 18 (20 Jul 2018 11:47) (18 - 18)  SpO2: 93% (20 Jul 2018 11:47) (93% - 98%)    Cardiovascular: Normal S1S2, No JVD, 1/6 KHADAR, Peripheral pulses palpable 2+ B/L  Respiratory: diminished at bases normal effort  Gastrointestinal: Abdomen soft, ND, NT, +BS  extremities no c/c/e      DIAGNOSTIC DATA    RADIOLOGY:   < from: Xray Chest 1 View AP/PA (07.17.18 @ 16:13) >  The mediastinal cardiac silhouette is unremarkable.    Bilateral reticular opacities in a perihilar distribution. The   differential includes edema versus infection. Correlate clinically and   follow to resolution.    No acute osseous finding.       < from: Transthoracic Echocardiogram (02.17.18 @ 07:15) >  OBSERVATIONS:  Mitral Valve: Normal mitral valve. Trace mitral  regurgitation.  Aortic Root: Aortic Root: 3.3 cm.    Aortic Valve: Normal trileaflet aortic valve. Trace aortic  regurgitation.  Left Ventricle: Normal Left Ventricular Systolic Function,  (EF = 55 to 60%) Normal left ventricular internal  dimensions and wall thicknesses. Grade II diastolic  dysfunction.  Right Heart: Normal right atrium. Normal right ventricular  size and function. There is trace tricuspidregurgitation.  There is trace pulmonic regurgitation.  Pericardium/PleuraNormal pericardium with no pericardial  effusion.        ASSESSMENT AND PLAN:    88 yo M with history of HTN, DM, CKD, dementia, HFpEF who is being seen for heart failure and PAF.  The pt. was admitted with orthopnea and LE edema and was found to have pulmonary edema on cxr.  He was briefly placed on BIPAP  and was diuresed with improvement in symptoms.  The patient was transferred to telemetry where he was found to have new onset AF which self converted to SR.  Cardiology consulted to further evaluate.  The patient denies chest pain, palpitations, syncope.  He reports improvement in dyspnea since admission.  Historically he has normal LV function with no prior ischemic evaluation.     - Acute on chronic diastolic CHF      - diuresing well     - c/w IV Lasix   - repeat TTE pending  - given elevated chads-vasc score, would recommend lifelong ac if no contraindications      -can start hep gtt if no contraindications and decide on PO AC when echo resulted   -further workup pending above

## 2018-07-20 NOTE — DISCHARGE NOTE ADULT - CARE PROVIDER_API CALL
Alon Schrader), Geriatric Medicine; HospicePalliative Medicine; Internal Medicine  [address]  [address]  [place] [address]  Phone: [phone]  Fax: [phone]    Johnny Armstrong), Cardiovascular Disease; Internal Medicine; Interventional Cardiology  [address] [facility]  [place] [address]  Phone: [phone]  Fax: [phone]

## 2018-07-20 NOTE — DISCHARGE NOTE ADULT - MEDICATION SUMMARY - MEDICATIONS TO CHANGE
I will SWITCH the dose or number of times a day I take the medications listed below when I get home from the hospital:    furosemide 20 mg oral tablet  -- 2 tab(s) by mouth once a day

## 2018-07-20 NOTE — DISCHARGE NOTE ADULT - PLAN OF CARE
IMPROVED Continue lasix 40mg BID, follow up with cardiology continue home meds You were given additional diuretics with improvement of your symptoms.   Monitor your salt and fluid intake. COnt with Lasix as prescribed.  Follow up with cardiology and PMD.  Monitor your weight closely Resolved. Likely was related to medical condition. You had an episode of irregular heart rhythm while inpt but now back to normal.   Decision to start blood thinner was discussed with you/family but not started due to risk for bleeding and fall. Risk of having a stroke was discussed. Cont with current BP meds Cont with Home meds

## 2018-07-20 NOTE — PROGRESS NOTE ADULT - PROBLEM SELECTOR PLAN 3
Converted to a fib, now in NSR  - c/w Atenolol for rate control.   - Cardiology consulted  - CHADSvasc 5. Discussed risk/benefit ratio of anticoagulation. Wife states that pt tends to fall frequently and is worried about risk of bleeding if AC is initiated. Would like to refrain from initiating at this time.

## 2018-07-20 NOTE — DISCHARGE NOTE ADULT - HOSPITAL COURSE
and LE edema a/w acute on chronic diastolic CHF.     Problem/Plan - 1:  ·  Problem: Acute on chronic diastolic (congestive) heart failure.  Plan: - Clinically much improved. Lasix changed to 40mg BID yesterday.   Over 9 liters net negative during hosp stay.   - not on ACE-I or ARB due to CKD  - Echo with diastolic disfunction, mild apical hypokinesis  - LE dopplers negative.      Problem/Plan - 2:  ·  Problem: Acute encephalopathy.  Plan: No agitations overnight. not requiring any meds.  Currently AAOx3 responsive. Understands he is going to rehab.      Problem/Plan - 3:  ·  Problem: Atrial fibrillation.  Plan: Converted to a fib, now in NSR  - c/w Atenolol for rate control.   - CHADSvasc 5. risk/benefit ratio of anticoagulation discussed. Wife states that pt tends to fall frequently and is worried about risk of bleeding if AC is initiated. Would like to refrain from initiating at this time.      Problem/Plan - 4:  ·  Problem: Essential hypertension.  Plan: - c/w Norvasc 10, Atenolol 50.   - If BP remains elevated would transition from Atenolol to Coreg 12.5mg for better BP control.      Problem/Plan - 5:  ·  Problem: Hypokalemia.  Plan: K 3.5. Likely improve with dec in Lasix.      Problem/Plan - 6:  Problem: Type 2 diabetes mellitus with stage 3 chronic kidney disease, with long-term current use of insulin. Plan: - BS elevated. Will resume back on home meds on discharge including oral and insulin.     Problem/Plan - 7:  ·  Problem: CKD (chronic kidney disease), stage III.  Plan: Cr at baseline, stable  - Avoid nephrotoxins  - Monitor Cr.      Problem/Plan - 8:  ·  Problem: Dementia.  Plan: - c/w Donepezil.      Problem/Plan - 9:  ·  Problem: Acquired hypothyroidism.  Plan: - c/w synthroid. 88 yo m with CHF diastolic, dementia, HTN, HLD, hypothyroid presented with acute on chronic diastolic HF intially requiring nitro gtt and IV lasix. Patient improved clinically with diuresis. Echo showed EF of 50% and mild diastolic dysfunction unchanged from previous. Patient was noted to have an episode of afib and converted back to SR. Initiation of blood thinner was discussed with family but deferred due to risk for bleeding and falls. Patient was seen by PT and SAM was recommended.

## 2018-07-20 NOTE — PROVIDER CONTACT NOTE (OTHER) - ASSESSMENT
Pt. a&ox4 , vss (other than low HR), no c/o of discomfort, dizziness, headache. Pt. is currently comfortable in bed.

## 2018-07-20 NOTE — PROGRESS NOTE ADULT - SUBJECTIVE AND OBJECTIVE BOX
Curt Bird MD  Division of Hospital Medicine  Pager [phone]      LUH CRAWFORD  89y  Male      Patient is a 89y old  Male who presents with a chief complaint of SOB (17 Jul 2018 21:54)      INTERVAL HPI/OVERNIGHT EVENTS:  Sitting in chair. States that he feels better. Wants to know when he can leave.       REVIEW OF SYSTEMS: 14 point ROS negative unless listed above    T(C): 36.2 (07-20-18 @ 11:47), Max: 36.8 (07-19-18 @ 20:54)  HR: 56 (07-20-18 @ 11:47) (56 - 62)  BP: 144/63 (07-20-18 @ 11:47) (144/63 - 184/70)  RR: 18 (07-20-18 @ 11:47) (18 - 18)  SpO2: 93% (07-20-18 @ 11:47) (93% - 98%)  Wt(kg): --Vital Signs Last 24 Hrs  T(C): 36.2 (20 Jul 2018 11:47), Max: 36.8 (19 Jul 2018 20:54)  T(F): 97.2 (20 Jul 2018 11:47), Max: 98.2 (19 Jul 2018 20:54)  HR: 56 (20 Jul 2018 11:47) (56 - 62)  BP: 144/63 (20 Jul 2018 11:47) (144/63 - 184/70)  BP(mean): --  RR: 18 (20 Jul 2018 11:47) (18 - 18)  SpO2: 93% (20 Jul 2018 11:47) (93% - 98%)    PHYSICAL EXAM:  GENERAL: NAD, well-groomed, well-developed  HEAD:  Atraumatic, Normocephalic  NECK: Supple, No JVD  CHEST/LUNG: CTA B/L  HEART: Regular rate and rhythm; No murmurs, rubs, or gallops  ABDOMEN: Soft, Nontender, Nondistended; Bowel sounds present.    EXTREMITIES:  2+ Peripheral Pulses, No clubbing, cyanosis, edema  NERVOUS SYSTEM: Motor Strength 5/5 B/L upper and lower extremities.  Sensory intact    Consultant(s) Notes Reviewed:  [x ] YES  [ ] NO  Care Discussed with Consultants/Other Providers [ x] YES  [ ] NO    LABS:                        11.4   9.65  )-----------( 244      ( 20 Jul 2018 08:12 )             33.7     07-20    140  |  92<L>  |  46<H>  ----------------------------<  194<H>  3.4<L>   |  30  |  1.99<H>    Ca    8.3<L>      20 Jul 2018 06:09  Phos  4.9     07-19  Mg     1.8     07-20          CAPILLARY BLOOD GLUCOSE      POCT Blood Glucose.: 193 mg/dL (20 Jul 2018 11:52)  POCT Blood Glucose.: 208 mg/dL (20 Jul 2018 07:51)  POCT Blood Glucose.: 231 mg/dL (19 Jul 2018 21:34)  POCT Blood Glucose.: 186 mg/dL (19 Jul 2018 16:41)            RADIOLOGY & ADDITIONAL TESTS:    Imaging Personally Reviewed:  [ x] YES  [ ] NO

## 2018-07-21 DIAGNOSIS — N18.3 CHRONIC KIDNEY DISEASE, STAGE 3 (MODERATE): ICD-10-CM

## 2018-07-21 LAB
ANION GAP SERPL CALC-SCNC: 18 MMOL/L — HIGH (ref 5–17)
BUN SERPL-MCNC: 58 MG/DL — HIGH (ref 7–23)
CALCIUM SERPL-MCNC: 8.6 MG/DL — SIGNIFICANT CHANGE UP (ref 8.4–10.5)
CHLORIDE SERPL-SCNC: 90 MMOL/L — LOW (ref 96–108)
CK MB BLD-MCNC: 2.3 % — SIGNIFICANT CHANGE UP (ref 0–3.5)
CK MB CFR SERPL CALC: 5.4 NG/ML — SIGNIFICANT CHANGE UP (ref 0–6.7)
CK SERPL-CCNC: 231 U/L — HIGH (ref 30–200)
CO2 SERPL-SCNC: 31 MMOL/L — SIGNIFICANT CHANGE UP (ref 22–31)
CREAT SERPL-MCNC: 1.99 MG/DL — HIGH (ref 0.5–1.3)
GLUCOSE BLDC GLUCOMTR-MCNC: 187 MG/DL — HIGH (ref 70–99)
GLUCOSE BLDC GLUCOMTR-MCNC: 223 MG/DL — HIGH (ref 70–99)
GLUCOSE BLDC GLUCOMTR-MCNC: 276 MG/DL — HIGH (ref 70–99)
GLUCOSE BLDC GLUCOMTR-MCNC: 392 MG/DL — HIGH (ref 70–99)
GLUCOSE SERPL-MCNC: 257 MG/DL — HIGH (ref 70–99)
HCT VFR BLD CALC: 34.9 % — LOW (ref 39–50)
HGB BLD-MCNC: 11.3 G/DL — LOW (ref 13–17)
MAGNESIUM SERPL-MCNC: 1.8 MG/DL — SIGNIFICANT CHANGE UP (ref 1.6–2.6)
MCHC RBC-ENTMCNC: 30.2 PG — SIGNIFICANT CHANGE UP (ref 27–34)
MCHC RBC-ENTMCNC: 32.4 GM/DL — SIGNIFICANT CHANGE UP (ref 32–36)
MCV RBC AUTO: 93.3 FL — SIGNIFICANT CHANGE UP (ref 80–100)
PHOSPHATE SERPL-MCNC: 5.5 MG/DL — HIGH (ref 2.5–4.5)
PLATELET # BLD AUTO: 255 K/UL — SIGNIFICANT CHANGE UP (ref 150–400)
POTASSIUM SERPL-MCNC: 3.3 MMOL/L — LOW (ref 3.5–5.3)
POTASSIUM SERPL-SCNC: 3.3 MMOL/L — LOW (ref 3.5–5.3)
RBC # BLD: 3.74 M/UL — LOW (ref 4.2–5.8)
RBC # FLD: 14.9 % — HIGH (ref 10.3–14.5)
SODIUM SERPL-SCNC: 139 MMOL/L — SIGNIFICANT CHANGE UP (ref 135–145)
TROPONIN T, HIGH SENSITIVITY RESULT: 54 NG/L — HIGH (ref 0–51)
WBC # BLD: 9.14 K/UL — SIGNIFICANT CHANGE UP (ref 3.8–10.5)
WBC # FLD AUTO: 9.14 K/UL — SIGNIFICANT CHANGE UP (ref 3.8–10.5)

## 2018-07-21 PROCEDURE — 99233 SBSQ HOSP IP/OBS HIGH 50: CPT

## 2018-07-21 PROCEDURE — 93010 ELECTROCARDIOGRAM REPORT: CPT

## 2018-07-21 RX ORDER — POTASSIUM CHLORIDE 20 MEQ
40 PACKET (EA) ORAL ONCE
Qty: 0 | Refills: 0 | Status: COMPLETED | OUTPATIENT
Start: 2018-07-21 | End: 2018-07-21

## 2018-07-21 RX ORDER — MAGNESIUM SULFATE 500 MG/ML
1 VIAL (ML) INJECTION ONCE
Qty: 0 | Refills: 0 | Status: COMPLETED | OUTPATIENT
Start: 2018-07-21 | End: 2018-07-21

## 2018-07-21 RX ADMIN — AMLODIPINE BESYLATE 10 MILLIGRAM(S): 2.5 TABLET ORAL at 05:31

## 2018-07-21 RX ADMIN — Medication 81 MILLIGRAM(S): at 11:26

## 2018-07-21 RX ADMIN — DONEPEZIL HYDROCHLORIDE 10 MILLIGRAM(S): 10 TABLET, FILM COATED ORAL at 21:55

## 2018-07-21 RX ADMIN — Medication 3: at 22:20

## 2018-07-21 RX ADMIN — Medication 1: at 17:04

## 2018-07-21 RX ADMIN — Medication 100 GRAM(S): at 05:30

## 2018-07-21 RX ADMIN — PRAMIPEXOLE DIHYDROCHLORIDE 0.25 MILLIGRAM(S): 0.12 TABLET ORAL at 05:30

## 2018-07-21 RX ADMIN — Medication 2: at 08:10

## 2018-07-21 RX ADMIN — Medication 100 MILLIGRAM(S): at 05:30

## 2018-07-21 RX ADMIN — Medication 80 MILLIGRAM(S): at 17:04

## 2018-07-21 RX ADMIN — Medication 112 MICROGRAM(S): at 05:30

## 2018-07-21 RX ADMIN — Medication 80 MILLIGRAM(S): at 05:31

## 2018-07-21 RX ADMIN — QUETIAPINE FUMARATE 50 MILLIGRAM(S): 200 TABLET, FILM COATED ORAL at 21:54

## 2018-07-21 RX ADMIN — SIMVASTATIN 5 MILLIGRAM(S): 20 TABLET, FILM COATED ORAL at 21:54

## 2018-07-21 RX ADMIN — ATENOLOL 50 MILLIGRAM(S): 25 TABLET ORAL at 05:30

## 2018-07-21 RX ADMIN — DULOXETINE HYDROCHLORIDE 30 MILLIGRAM(S): 30 CAPSULE, DELAYED RELEASE ORAL at 11:26

## 2018-07-21 RX ADMIN — Medication 3: at 12:51

## 2018-07-21 RX ADMIN — Medication 40 MILLIEQUIVALENT(S): at 05:30

## 2018-07-21 RX ADMIN — PRAMIPEXOLE DIHYDROCHLORIDE 0.25 MILLIGRAM(S): 0.12 TABLET ORAL at 21:54

## 2018-07-21 RX ADMIN — SENNA PLUS 2 TABLET(S): 8.6 TABLET ORAL at 21:54

## 2018-07-21 RX ADMIN — PRAMIPEXOLE DIHYDROCHLORIDE 0.25 MILLIGRAM(S): 0.12 TABLET ORAL at 13:40

## 2018-07-21 NOTE — PROVIDER CONTACT NOTE (OTHER) - ACTION/TREATMENT ORDERED:
NP aware, continue to monitor tele, no ekg
NP aware. EKG ordered and completed. Will continue to monitor.
PA aware. EKG ordered. Will continue to monitor pt.
provider ordered: EKG, continue monitoring

## 2018-07-21 NOTE — PROGRESS NOTE ADULT - SUBJECTIVE AND OBJECTIVE BOX
Curt Bird MD  Division of Hospital Medicine  Pager [phone]      LUH CRAWFORD  89y  Male      Patient is a 89y old  Male who presents with a chief complaint of SOB (20 Jul 2018 13:36)      INTERVAL HPI/OVERNIGHT EVENTS:  Seen at bedside. Remains pleasant. COnfused. Denies any complaints. Wants to know when he can go home      REVIEW OF SYSTEMS: 14 point ROS negative unless listed above    T(C): 36.5 (07-21-18 @ 11:26), Max: 36.8 (07-20-18 @ 21:04)  HR: 60 (07-21-18 @ 11:26) (58 - 74)  BP: 128/63 (07-21-18 @ 11:26) (128/63 - 177/73)  RR: 18 (07-21-18 @ 11:26) (18 - 18)  SpO2: 100% (07-21-18 @ 11:26) (94% - 100%)  Wt(kg): --Vital Signs Last 24 Hrs  T(C): 36.5 (21 Jul 2018 11:26), Max: 36.8 (20 Jul 2018 21:04)  T(F): 97.7 (21 Jul 2018 11:26), Max: 98.2 (20 Jul 2018 21:04)  HR: 60 (21 Jul 2018 11:26) (58 - 74)  BP: 128/63 (21 Jul 2018 11:26) (128/63 - 177/73)  BP(mean): --  RR: 18 (21 Jul 2018 11:26) (18 - 18)  SpO2: 100% (21 Jul 2018 11:26) (94% - 100%)    PHYSICAL EXAM:  GENERAL: NAD, well-groomed, well-developed  HEAD:  Atraumatic, Normocephalic  NECK: Supple, No JVD  CHEST/LUNG: CTA B/L  HEART: Regular rate and rhythm; No murmurs, rubs, or gallops  ABDOMEN: Soft, Nontender, Nondistended; Bowel sounds present.    EXTREMITIES:  2+ Peripheral Pulses, No clubbing, cyanosis, edema  NERVOUS SYSTEM: Motor Strength 5/5 B/L upper and lower extremities.  Sensory intact      Consultant(s) Notes Reviewed:  [x ] YES  [ ] NO  Care Discussed with Consultants/Other Providers [ x] YES  [ ] NO    LABS:                        11.3   9.14  )-----------( 255      ( 21 Jul 2018 08:56 )             34.9     07-21    139  |  90<L>  |  58<H>  ----------------------------<  257<H>  3.3<L>   |  31  |  1.99<H>    Ca    8.6      21 Jul 2018 02:32  Phos  5.5     07-21  Mg     1.8     07-21          CAPILLARY BLOOD GLUCOSE      POCT Blood Glucose.: 276 mg/dL (21 Jul 2018 12:14)  POCT Blood Glucose.: 223 mg/dL (21 Jul 2018 08:08)  POCT Blood Glucose.: 228 mg/dL (20 Jul 2018 21:43)  POCT Blood Glucose.: 305 mg/dL (20 Jul 2018 19:44)  POCT Blood Glucose.: 319 mg/dL (20 Jul 2018 16:53)            RADIOLOGY & ADDITIONAL TESTS:    Imaging Personally Reviewed:  [x ] YES  [ ] NO

## 2018-07-21 NOTE — PROGRESS NOTE ADULT - PROBLEM SELECTOR PLAN 1
Pt presented with RR 26, O2 sat 75% RA on presentation was treated with IV lasix and BIPAP with improvement of symptoms  - On lasix 80mg IV BID. Nearing euvolemia. Diuretics as per cardiology  - Remains on NC 2L. Titrate off oxygen as tolerated

## 2018-07-21 NOTE — PROGRESS NOTE ADULT - PROBLEM SELECTOR PLAN 2
- will c/w Lasix 80 iv bid.   - Monitor lytes  - tele monitor  - strict I&O's with daily rate  - not on ACE-I or ARB due to CKD  - Echo with diastolic disfunction, mild apical hypokinesis  - LE dopplers negative

## 2018-07-21 NOTE — PROGRESS NOTE ADULT - SUBJECTIVE AND OBJECTIVE BOX
SUBJECTIVE:  pt seen and examined, no complaints on exam.   pt agitated at times.   no apparent distress on exam     amLODIPine   Tablet 10 milliGRAM(s) Oral daily  aspirin enteric coated 81 milliGRAM(s) Oral daily  ATENolol  Tablet 50 milliGRAM(s) Oral daily  dextrose 40% Gel 15 Gram(s) Oral once PRN  dextrose 5%. 1000 milliLiter(s) IV Continuous <Continuous>  dextrose 50% Injectable 12.5 Gram(s) IV Push once  dextrose 50% Injectable 25 Gram(s) IV Push once  dextrose 50% Injectable 25 Gram(s) IV Push once  docusate sodium 100 milliGRAM(s) Oral three times a day  donepezil 10 milliGRAM(s) Oral at bedtime  DULoxetine 30 milliGRAM(s) Oral daily  furosemide   Injectable 80 milliGRAM(s) IV Push two times a day  glucagon  Injectable 1 milliGRAM(s) IntraMuscular once PRN  insulin lispro (HumaLOG) corrective regimen sliding scale   SubCutaneous three times a day before meals  insulin lispro (HumaLOG) corrective regimen sliding scale   SubCutaneous at bedtime  levothyroxine 112 MICROGram(s) Oral daily  pramipexole 0.25 milliGRAM(s) Oral three times a day  QUEtiapine 50 milliGRAM(s) Oral at bedtime  senna 2 Tablet(s) Oral at bedtime  simvastatin 5 milliGRAM(s) Oral at bedtime                            11.4   9.65  )-----------( 244      ( 20 Jul 2018 08:12 )             33.7       Hemoglobin: 11.4 g/dL (07-20 @ 08:12)  Hemoglobin: 10.4 g/dL (07-19 @ 08:17)  Hemoglobin: 11.1 g/dL (07-17 @ 16:36)      07-21    139  |  90<L>  |  58<H>  ----------------------------<  257<H>  3.3<L>   |  31  |  1.99<H>    Ca    8.6      21 Jul 2018 02:32  Phos  5.5     07-21  Mg     1.8     07-21      Creatinine Trend: 1.99<--, 1.99<--, 1.91<--, 1.95<--, 1.70<--    COAGS:     CARDIAC MARKERS ( 21 Jul 2018 02:32 )  x     / x     / 231 U/L / x     / 5.4 ng/mL  CARDIAC MARKERS ( 20 Jul 2018 06:09 )  x     / x     / 354 U/L / x     / 6.4 ng/mL  CARDIAC MARKERS ( 19 Jul 2018 14:12 )  x     / x     / 551 U/L / x     / 7.8 ng/mL        T(C): 36.3 (07-21-18 @ 05:06), Max: 36.8 (07-20-18 @ 21:04)  HR: 58 (07-21-18 @ 05:06) (56 - 74)  BP: 158/68 (07-21-18 @ 05:06) (131/65 - 177/73)  RR: 18 (07-21-18 @ 05:06) (18 - 18)  SpO2: 100% (07-21-18 @ 05:06) (93% - 100%)  Wt(kg): --    I&O's Summary    20 Jul 2018 07:01  -  21 Jul 2018 07:00  --------------------------------------------------------  IN: 1390 mL / OUT: 1990 mL / NET: -600 mL         Cardiovascular: Normal S1S2, No JVD, 1/6 KHADAR, Peripheral pulses palpable 2+ B/L  Respiratory: diminished at bases normal effort  Gastrointestinal: Abdomen soft, ND, NT, +BS  extremities no c/c/e      DIAGNOSTIC DATA    RADIOLOGY:   < from: Xray Chest 1 View AP/PA (07.17.18 @ 16:13) >  The mediastinal cardiac silhouette is unremarkable.    Bilateral reticular opacities in a perihilar distribution. The   differential includes edema versus infection. Correlate clinically and   follow to resolution.    No acute osseous finding.       < from: Transthoracic Echocardiogram (02.17.18 @ 07:15) >  OBSERVATIONS:  Mitral Valve: Normal mitral valve. Trace mitral  regurgitation.  Aortic Root: Aortic Root: 3.3 cm.    Aortic Valve: Normal trileaflet aortic valve. Trace aortic  regurgitation.  Left Ventricle: Normal Left Ventricular Systolic Function,  (EF = 55 to 60%) Normal left ventricular internal  dimensions and wall thicknesses. Grade II diastolic  dysfunction.  Right Heart: Normal right atrium. Normal right ventricular  size and function. There is trace tricuspidregurgitation.  There is trace pulmonic regurgitation.  Pericardium/PleuraNormal pericardium with no pericardial  effusion.        ASSESSMENT AND PLAN:    88 yo M with history of HTN, DM, CKD, dementia, HFpEF who is being seen for heart failure and PAF.  The pt. was admitted with orthopnea and LE edema and was found to have pulmonary edema on cxr.  He was briefly placed on BIPAP  and was diuresed with improvement in symptoms.  The patient was transferred to telemetry where he was found to have new onset AF which self converted to SR.  Cardiology consulted to further evaluate.  The patient denies chest pain, palpitations, syncope.  He reports improvement in dyspnea since admission.  Historically he has normal LV function with no prior ischemic evaluation.     - Acute on chronic diastolic CHF , supp o2      - diuresing well     - c/w IV Lasix q 12   - repeat TTE pending  - given elevated chads-vasc score, would recommend lifelong ac if no contraindications      -can start hep gtt if no contraindications and decide on PO AC when echo resulted   -further workup pending above  D/W Dr Armstrong

## 2018-07-22 LAB
ANION GAP SERPL CALC-SCNC: 15 MMOL/L — SIGNIFICANT CHANGE UP (ref 5–17)
BUN SERPL-MCNC: 58 MG/DL — HIGH (ref 7–23)
CALCIUM SERPL-MCNC: 8.6 MG/DL — SIGNIFICANT CHANGE UP (ref 8.4–10.5)
CHLORIDE SERPL-SCNC: 94 MMOL/L — LOW (ref 96–108)
CO2 SERPL-SCNC: 33 MMOL/L — HIGH (ref 22–31)
CREAT SERPL-MCNC: 2.02 MG/DL — HIGH (ref 0.5–1.3)
GLUCOSE BLDC GLUCOMTR-MCNC: 224 MG/DL — HIGH (ref 70–99)
GLUCOSE BLDC GLUCOMTR-MCNC: 243 MG/DL — HIGH (ref 70–99)
GLUCOSE BLDC GLUCOMTR-MCNC: 260 MG/DL — HIGH (ref 70–99)
GLUCOSE BLDC GLUCOMTR-MCNC: 266 MG/DL — HIGH (ref 70–99)
GLUCOSE BLDC GLUCOMTR-MCNC: 294 MG/DL — HIGH (ref 70–99)
GLUCOSE SERPL-MCNC: 214 MG/DL — HIGH (ref 70–99)
HCT VFR BLD CALC: 31.8 % — LOW (ref 39–50)
HGB BLD-MCNC: 10.3 G/DL — LOW (ref 13–17)
MCHC RBC-ENTMCNC: 30.2 PG — SIGNIFICANT CHANGE UP (ref 27–34)
MCHC RBC-ENTMCNC: 32.4 GM/DL — SIGNIFICANT CHANGE UP (ref 32–36)
MCV RBC AUTO: 93.3 FL — SIGNIFICANT CHANGE UP (ref 80–100)
PLATELET # BLD AUTO: 242 K/UL — SIGNIFICANT CHANGE UP (ref 150–400)
POTASSIUM SERPL-MCNC: 3.2 MMOL/L — LOW (ref 3.5–5.3)
POTASSIUM SERPL-SCNC: 3.2 MMOL/L — LOW (ref 3.5–5.3)
RBC # BLD: 3.41 M/UL — LOW (ref 4.2–5.8)
RBC # FLD: 14.5 % — SIGNIFICANT CHANGE UP (ref 10.3–14.5)
SODIUM SERPL-SCNC: 142 MMOL/L — SIGNIFICANT CHANGE UP (ref 135–145)
WBC # BLD: 8.75 K/UL — SIGNIFICANT CHANGE UP (ref 3.8–10.5)
WBC # FLD AUTO: 8.75 K/UL — SIGNIFICANT CHANGE UP (ref 3.8–10.5)

## 2018-07-22 PROCEDURE — 99233 SBSQ HOSP IP/OBS HIGH 50: CPT

## 2018-07-22 RX ORDER — POTASSIUM CHLORIDE 20 MEQ
20 PACKET (EA) ORAL ONCE
Qty: 0 | Refills: 0 | Status: COMPLETED | OUTPATIENT
Start: 2018-07-22 | End: 2018-07-22

## 2018-07-22 RX ORDER — FUROSEMIDE 40 MG
40 TABLET ORAL
Qty: 0 | Refills: 0 | Status: DISCONTINUED | OUTPATIENT
Start: 2018-07-22 | End: 2018-07-23

## 2018-07-22 RX ORDER — HEPARIN SODIUM 5000 [USP'U]/ML
5000 INJECTION INTRAVENOUS; SUBCUTANEOUS EVERY 8 HOURS
Qty: 0 | Refills: 0 | Status: DISCONTINUED | OUTPATIENT
Start: 2018-07-22 | End: 2018-07-23

## 2018-07-22 RX ADMIN — DULOXETINE HYDROCHLORIDE 30 MILLIGRAM(S): 30 CAPSULE, DELAYED RELEASE ORAL at 11:47

## 2018-07-22 RX ADMIN — Medication 40 MILLIGRAM(S): at 17:30

## 2018-07-22 RX ADMIN — Medication 100 MILLIGRAM(S): at 05:35

## 2018-07-22 RX ADMIN — Medication 2: at 08:00

## 2018-07-22 RX ADMIN — DONEPEZIL HYDROCHLORIDE 10 MILLIGRAM(S): 10 TABLET, FILM COATED ORAL at 21:46

## 2018-07-22 RX ADMIN — Medication 3: at 11:46

## 2018-07-22 RX ADMIN — Medication 100 MILLIGRAM(S): at 21:46

## 2018-07-22 RX ADMIN — SENNA PLUS 2 TABLET(S): 8.6 TABLET ORAL at 21:47

## 2018-07-22 RX ADMIN — PRAMIPEXOLE DIHYDROCHLORIDE 0.25 MILLIGRAM(S): 0.12 TABLET ORAL at 13:10

## 2018-07-22 RX ADMIN — Medication 80 MILLIGRAM(S): at 05:35

## 2018-07-22 RX ADMIN — ATENOLOL 50 MILLIGRAM(S): 25 TABLET ORAL at 05:35

## 2018-07-22 RX ADMIN — AMLODIPINE BESYLATE 10 MILLIGRAM(S): 2.5 TABLET ORAL at 05:36

## 2018-07-22 RX ADMIN — PRAMIPEXOLE DIHYDROCHLORIDE 0.25 MILLIGRAM(S): 0.12 TABLET ORAL at 21:46

## 2018-07-22 RX ADMIN — PRAMIPEXOLE DIHYDROCHLORIDE 0.25 MILLIGRAM(S): 0.12 TABLET ORAL at 05:35

## 2018-07-22 RX ADMIN — Medication 20 MILLIEQUIVALENT(S): at 09:05

## 2018-07-22 RX ADMIN — QUETIAPINE FUMARATE 50 MILLIGRAM(S): 200 TABLET, FILM COATED ORAL at 21:46

## 2018-07-22 RX ADMIN — Medication 3: at 17:29

## 2018-07-22 RX ADMIN — Medication 1: at 21:48

## 2018-07-22 RX ADMIN — HEPARIN SODIUM 5000 UNIT(S): 5000 INJECTION INTRAVENOUS; SUBCUTANEOUS at 21:47

## 2018-07-22 RX ADMIN — Medication 100 MILLIGRAM(S): at 13:10

## 2018-07-22 RX ADMIN — Medication 112 MICROGRAM(S): at 05:35

## 2018-07-22 RX ADMIN — Medication 81 MILLIGRAM(S): at 11:47

## 2018-07-22 RX ADMIN — SIMVASTATIN 5 MILLIGRAM(S): 20 TABLET, FILM COATED ORAL at 21:46

## 2018-07-22 RX ADMIN — HEPARIN SODIUM 5000 UNIT(S): 5000 INJECTION INTRAVENOUS; SUBCUTANEOUS at 15:44

## 2018-07-22 NOTE — PROGRESS NOTE ADULT - PROBLEM SELECTOR PLAN 1
- Diuresing well. Appears euvolemic. Will change Lasix 80 iv bid to 40mg PO BID today.   - Monitor lytes  - tele monitor  - strict I&O's with daily rate  - not on ACE-I or ARB due to CKD  - Echo with diastolic disfunction, mild apical hypokinesis  - LE dopplers negative

## 2018-07-22 NOTE — PROGRESS NOTE ADULT - PROBLEM SELECTOR PLAN 2
Pt presented with RR 26, O2 sat 75% RA on presentation was treated with IV lasix and BIPAP with improvement of symptoms  - On lasix 80mg IV BID. Appears euvolemic. Diuretics as above  - Remains on NC 2L. Titrate off oxygen as tolerated

## 2018-07-22 NOTE — PROGRESS NOTE ADULT - SUBJECTIVE AND OBJECTIVE BOX
SUBJECTIVE:  pt seen and examined, no complaints on exam.   .   no apparent distress on exam     amLODIPine   Tablet 10 milliGRAM(s) Oral daily  aspirin enteric coated 81 milliGRAM(s) Oral daily  ATENolol  Tablet 50 milliGRAM(s) Oral daily  dextrose 40% Gel 15 Gram(s) Oral once PRN  dextrose 5%. 1000 milliLiter(s) IV Continuous <Continuous>  dextrose 50% Injectable 12.5 Gram(s) IV Push once  dextrose 50% Injectable 25 Gram(s) IV Push once  dextrose 50% Injectable 25 Gram(s) IV Push once  docusate sodium 100 milliGRAM(s) Oral three times a day  donepezil 10 milliGRAM(s) Oral at bedtime  DULoxetine 30 milliGRAM(s) Oral daily  furosemide   Injectable 80 milliGRAM(s) IV Push two times a day  glucagon  Injectable 1 milliGRAM(s) IntraMuscular once PRN  insulin lispro (HumaLOG) corrective regimen sliding scale   SubCutaneous three times a day before meals  insulin lispro (HumaLOG) corrective regimen sliding scale   SubCutaneous at bedtime  levothyroxine 112 MICROGram(s) Oral daily  pramipexole 0.25 milliGRAM(s) Oral three times a day  QUEtiapine 50 milliGRAM(s) Oral at bedtime  senna 2 Tablet(s) Oral at bedtime  simvastatin 5 milliGRAM(s) Oral at bedtime                            11.3   9.14  )-----------( 255      ( 21 Jul 2018 08:56 )             34.9       Hemoglobin: 11.3 g/dL (07-21 @ 08:56)  Hemoglobin: 11.4 g/dL (07-20 @ 08:12)  Hemoglobin: 10.4 g/dL (07-19 @ 08:17)  Hemoglobin: 11.1 g/dL (07-17 @ 16:36)      07-21    139  |  90<L>  |  58<H>  ----------------------------<  257<H>  3.3<L>   |  31  |  1.99<H>    Ca    8.6      21 Jul 2018 02:32  Phos  5.5     07-21  Mg     1.8     07-21      Creatinine Trend: 1.99<--, 1.99<--, 1.91<--, 1.95<--, 1.70<--    COAGS:     CARDIAC MARKERS ( 21 Jul 2018 02:32 )  x     / x     / 231 U/L / x     / 5.4 ng/mL  CARDIAC MARKERS ( 20 Jul 2018 06:09 )  x     / x     / 354 U/L / x     / 6.4 ng/mL  CARDIAC MARKERS ( 19 Jul 2018 14:12 )  x     / x     / 551 U/L / x     / 7.8 ng/mL        T(C): 36.4 (07-22-18 @ 04:12), Max: 36.6 (07-21-18 @ 21:00)  HR: 66 (07-22-18 @ 04:12) (58 - 66)  BP: 168/70 (07-22-18 @ 04:12) (115/58 - 175/64)  RR: 18 (07-22-18 @ 04:12) (18 - 18)  SpO2: 99% (07-22-18 @ 04:12) (99% - 100%)  Wt(kg): --    I&O's Summary    20 Jul 2018 07:01  -  21 Jul 2018 07:00  --------------------------------------------------------  IN: 1390 mL / OUT: 1990 mL / NET: -600 mL    21 Jul 2018 07:01  -  22 Jul 2018 06:21  --------------------------------------------------------  IN: 720 mL / OUT: 1950 mL / NET: -1230 mL      Cardiovascular: Normal S1S2, No JVD, 1/6 KHADAR, Peripheral pulses palpable 2+ B/L  Respiratory: diminished at bases normal effort  Gastrointestinal: Abdomen soft, ND, NT, +BS  extremities no c/c/e      DIAGNOSTIC DATA    RADIOLOGY:   < from: Xray Chest 1 View AP/PA (07.17.18 @ 16:13) >  The mediastinal cardiac silhouette is unremarkable.    Bilateral reticular opacities in a perihilar distribution. The   differential includes edema versus infection. Correlate clinically and   follow to resolution.    No acute osseous finding.       < from: Transthoracic Echocardiogram (02.17.18 @ 07:15) >  OBSERVATIONS:  Mitral Valve: Normal mitral valve. Trace mitral  regurgitation.  Aortic Root: Aortic Root: 3.3 cm.    Aortic Valve: Normal trileaflet aortic valve. Trace aortic  regurgitation.  Left Ventricle: Normal Left Ventricular Systolic Function,  (EF = 55 to 60%) Normal left ventricular internal  dimensions and wall thicknesses. Grade II diastolic  dysfunction.  Right Heart: Normal right atrium. Normal right ventricular  size and function. There is trace tricuspidregurgitation.  There is trace pulmonic regurgitation.  Pericardium/PleuraNormal pericardium with no pericardial  effusion.        ASSESSMENT AND PLAN:    89 yo M with history of HTN, DM, CKD, dementia, HFpEF who is being seen for heart failure and PAF.  The pt. was admitted with orthopnea and LE edema and was found to have pulmonary edema on cxr.  He was briefly placed on BIPAP  and was diuresed with improvement in symptoms.  The patient was transferred to telemetry where he was found to have new onset AF which self converted to SR.  Cardiology consulted to further evaluate.  The patient denies chest pain, palpitations, syncope.  He reports improvement in dyspnea since admission.  Historically he has normal LV function with no prior ischemic evaluation.     cont ASA, statin ,   keep net neg with IV lasix       - diuresing well  - Echo: mild apical hypokinesis   - given elevated chads-vasc score, would recommend lifelong ac if no contraindications, held due to high fall risk.  - no further inpatient Cardiac work up needed   D/W Dr Armstrong SUBJECTIVE:  pt seen and examined, no complaints on exam.   .   no apparent distress on exam     amLODIPine   Tablet 10 milliGRAM(s) Oral daily  aspirin enteric coated 81 milliGRAM(s) Oral daily  ATENolol  Tablet 50 milliGRAM(s) Oral daily  dextrose 40% Gel 15 Gram(s) Oral once PRN  dextrose 5%. 1000 milliLiter(s) IV Continuous <Continuous>  dextrose 50% Injectable 12.5 Gram(s) IV Push once  dextrose 50% Injectable 25 Gram(s) IV Push once  dextrose 50% Injectable 25 Gram(s) IV Push once  docusate sodium 100 milliGRAM(s) Oral three times a day  donepezil 10 milliGRAM(s) Oral at bedtime  DULoxetine 30 milliGRAM(s) Oral daily  furosemide   Injectable 80 milliGRAM(s) IV Push two times a day  glucagon  Injectable 1 milliGRAM(s) IntraMuscular once PRN  insulin lispro (HumaLOG) corrective regimen sliding scale   SubCutaneous three times a day before meals  insulin lispro (HumaLOG) corrective regimen sliding scale   SubCutaneous at bedtime  levothyroxine 112 MICROGram(s) Oral daily  pramipexole 0.25 milliGRAM(s) Oral three times a day  QUEtiapine 50 milliGRAM(s) Oral at bedtime  senna 2 Tablet(s) Oral at bedtime  simvastatin 5 milliGRAM(s) Oral at bedtime                            11.3   9.14  )-----------( 255      ( 21 Jul 2018 08:56 )             34.9       Hemoglobin: 11.3 g/dL (07-21 @ 08:56)  Hemoglobin: 11.4 g/dL (07-20 @ 08:12)  Hemoglobin: 10.4 g/dL (07-19 @ 08:17)  Hemoglobin: 11.1 g/dL (07-17 @ 16:36)      07-21    139  |  90<L>  |  58<H>  ----------------------------<  257<H>  3.3<L>   |  31  |  1.99<H>    Ca    8.6      21 Jul 2018 02:32  Phos  5.5     07-21  Mg     1.8     07-21      Creatinine Trend: 1.99<--, 1.99<--, 1.91<--, 1.95<--, 1.70<--    COAGS:     CARDIAC MARKERS ( 21 Jul 2018 02:32 )  x     / x     / 231 U/L / x     / 5.4 ng/mL  CARDIAC MARKERS ( 20 Jul 2018 06:09 )  x     / x     / 354 U/L / x     / 6.4 ng/mL  CARDIAC MARKERS ( 19 Jul 2018 14:12 )  x     / x     / 551 U/L / x     / 7.8 ng/mL        T(C): 36.4 (07-22-18 @ 04:12), Max: 36.6 (07-21-18 @ 21:00)  HR: 66 (07-22-18 @ 04:12) (58 - 66)  BP: 168/70 (07-22-18 @ 04:12) (115/58 - 175/64)  RR: 18 (07-22-18 @ 04:12) (18 - 18)  SpO2: 99% (07-22-18 @ 04:12) (99% - 100%)  Wt(kg): --    I&O's Summary    20 Jul 2018 07:01  -  21 Jul 2018 07:00  --------------------------------------------------------  IN: 1390 mL / OUT: 1990 mL / NET: -600 mL    21 Jul 2018 07:01  -  22 Jul 2018 06:21  --------------------------------------------------------  IN: 720 mL / OUT: 1950 mL / NET: -1230 mL      Cardiovascular: Normal S1S2, No JVD, 1/6 KHADAR, Peripheral pulses palpable 2+ B/L  Respiratory: diminished at bases normal effort  Gastrointestinal: Abdomen soft, ND, NT, +BS  extremities no c/c/e      DIAGNOSTIC DATA    RADIOLOGY:   < from: Xray Chest 1 View AP/PA (07.17.18 @ 16:13) >  The mediastinal cardiac silhouette is unremarkable.    Bilateral reticular opacities in a perihilar distribution. The   differential includes edema versus infection. Correlate clinically and   follow to resolution.    No acute osseous finding.       < from: Transthoracic Echocardiogram (02.17.18 @ 07:15) >  OBSERVATIONS:  Mitral Valve: Normal mitral valve. Trace mitral  regurgitation.  Aortic Root: Aortic Root: 3.3 cm.    Aortic Valve: Normal trileaflet aortic valve. Trace aortic  regurgitation.  Left Ventricle: Normal Left Ventricular Systolic Function,  (EF = 55 to 60%) Normal left ventricular internal  dimensions and wall thicknesses. Grade II diastolic  dysfunction.  Right Heart: Normal right atrium. Normal right ventricular  size and function. There is trace tricuspidregurgitation.  There is trace pulmonic regurgitation.  Pericardium/PleuraNormal pericardium with no pericardial  effusion.        ASSESSMENT AND PLAN:    89 yo M with history of HTN, DM, CKD, dementia, HFpEF who is being seen for heart failure and PAF.  The pt. was admitted with orthopnea and LE edema and was found to have pulmonary edema on cxr.  He was briefly placed on BIPAP  and was diuresed with improvement in symptoms.  The patient was transferred to telemetry where he was found to have new onset AF which self converted to SR.  Cardiology consulted to further evaluate.  The patient denies chest pain, palpitations, syncope.  He reports improvement in dyspnea since admission.  Historically he has normal LV function with no prior ischemic evaluation.     cont ASA, statin ,   keep net neg with IV lasix       - diuresing well  - Echo: mild apical hypokinesis   - given elevated chads-vasc score, would recommend lifelong ac if no contraindications, held due to high fall risk.  D/W Dr Armstrong

## 2018-07-22 NOTE — PROGRESS NOTE ADULT - SUBJECTIVE AND OBJECTIVE BOX
Curt Bird MD  Division of Hospital Medicine  Pager [phone]      LUH CRAWFORD  89y  Male      Patient is a 89y old  Male who presents with a chief complaint of SOB (20 Jul 2018 13:36)      INTERVAL HPI/OVERNIGHT EVENTS:  Seen at bedside. Is without complaints. Wants to know when he can go home      REVIEW OF SYSTEMS: 14 point ROS negative unless listed above    T(C): 36.4 (07-22-18 @ 11:13), Max: 36.6 (07-21-18 @ 21:00)  HR: 63 (07-22-18 @ 11:13) (58 - 66)  BP: 154/70 (07-22-18 @ 11:13) (115/58 - 175/64)  RR: 18 (07-22-18 @ 11:13) (18 - 18)  SpO2: 100% (07-22-18 @ 11:13) (99% - 100%)  Wt(kg): --Vital Signs Last 24 Hrs  T(C): 36.4 (22 Jul 2018 11:13), Max: 36.6 (21 Jul 2018 21:00)  T(F): 97.5 (22 Jul 2018 11:13), Max: 97.9 (21 Jul 2018 21:00)  HR: 63 (22 Jul 2018 11:13) (58 - 66)  BP: 154/70 (22 Jul 2018 11:13) (115/58 - 175/64)  BP(mean): --  RR: 18 (22 Jul 2018 11:13) (18 - 18)  SpO2: 100% (22 Jul 2018 11:13) (99% - 100%)    PHYSICAL EXAM:  GENERAL: NAD, well-groomed, well-developed  HEAD:  Atraumatic, Normocephalic  NECK: Supple, No JVD  CHEST/LUNG: CTA B/L  HEART: Regular rate and rhythm; No murmurs, rubs, or gallops  ABDOMEN: Soft, Nontender, Nondistended; Bowel sounds present.    EXTREMITIES:  2+ Peripheral Pulses, No clubbing, cyanosis, edema  NERVOUS SYSTEM: Motor Strength 5/5 B/L upper and lower extremities.  Sensory intact    Consultant(s) Notes Reviewed:  [x ] YES  [ ] NO  Care Discussed with Consultants/Other Providers [ x] YES  [ ] NO    LABS:                        10.3   8.75  )-----------( 242      ( 22 Jul 2018 07:59 )             31.8     07-22    142  |  94<L>  |  58<H>  ----------------------------<  214<H>  3.2<L>   |  33<H>  |  2.02<H>    Ca    8.6      22 Jul 2018 06:54  Phos  5.5     07-21  Mg     1.8     07-21          CAPILLARY BLOOD GLUCOSE      POCT Blood Glucose.: 266 mg/dL (22 Jul 2018 11:24)  POCT Blood Glucose.: 224 mg/dL (22 Jul 2018 07:35)  POCT Blood Glucose.: 392 mg/dL (21 Jul 2018 22:16)  POCT Blood Glucose.: 187 mg/dL (21 Jul 2018 16:38)  POCT Blood Glucose.: 276 mg/dL (21 Jul 2018 12:14)            RADIOLOGY & ADDITIONAL TESTS:    Imaging Personally Reviewed:  [x ] YES  [ ] NO

## 2018-07-23 VITALS
TEMPERATURE: 98 F | RESPIRATION RATE: 18 BRPM | OXYGEN SATURATION: 97 % | SYSTOLIC BLOOD PRESSURE: 154 MMHG | HEART RATE: 58 BPM | DIASTOLIC BLOOD PRESSURE: 64 MMHG

## 2018-07-23 DIAGNOSIS — G93.40 ENCEPHALOPATHY, UNSPECIFIED: ICD-10-CM

## 2018-07-23 LAB
ANION GAP SERPL CALC-SCNC: 15 MMOL/L — SIGNIFICANT CHANGE UP (ref 5–17)
BASOPHILS # BLD AUTO: 0.02 K/UL — SIGNIFICANT CHANGE UP (ref 0–0.2)
BASOPHILS NFR BLD AUTO: 0.2 % — SIGNIFICANT CHANGE UP (ref 0–2)
BUN SERPL-MCNC: 48 MG/DL — HIGH (ref 7–23)
CALCIUM SERPL-MCNC: 8.8 MG/DL — SIGNIFICANT CHANGE UP (ref 8.4–10.5)
CHLORIDE SERPL-SCNC: 90 MMOL/L — LOW (ref 96–108)
CO2 SERPL-SCNC: 33 MMOL/L — HIGH (ref 22–31)
CREAT SERPL-MCNC: 1.6 MG/DL — HIGH (ref 0.5–1.3)
EOSINOPHIL # BLD AUTO: 0.16 K/UL — SIGNIFICANT CHANGE UP (ref 0–0.5)
EOSINOPHIL NFR BLD AUTO: 1.8 % — SIGNIFICANT CHANGE UP (ref 0–6)
GLUCOSE BLDC GLUCOMTR-MCNC: 145 MG/DL — HIGH (ref 70–99)
GLUCOSE BLDC GLUCOMTR-MCNC: 256 MG/DL — HIGH (ref 70–99)
GLUCOSE BLDC GLUCOMTR-MCNC: 368 MG/DL — HIGH (ref 70–99)
GLUCOSE SERPL-MCNC: 239 MG/DL — HIGH (ref 70–99)
HCT VFR BLD CALC: 31.9 % — LOW (ref 39–50)
HGB BLD-MCNC: 10.5 G/DL — LOW (ref 13–17)
IMM GRANULOCYTES NFR BLD AUTO: 0.1 % — SIGNIFICANT CHANGE UP (ref 0–1.5)
LYMPHOCYTES # BLD AUTO: 1.57 K/UL — SIGNIFICANT CHANGE UP (ref 1–3.3)
LYMPHOCYTES # BLD AUTO: 17.8 % — SIGNIFICANT CHANGE UP (ref 13–44)
MCHC RBC-ENTMCNC: 30.6 PG — SIGNIFICANT CHANGE UP (ref 27–34)
MCHC RBC-ENTMCNC: 32.9 GM/DL — SIGNIFICANT CHANGE UP (ref 32–36)
MCV RBC AUTO: 93 FL — SIGNIFICANT CHANGE UP (ref 80–100)
MONOCYTES # BLD AUTO: 0.84 K/UL — SIGNIFICANT CHANGE UP (ref 0–0.9)
MONOCYTES NFR BLD AUTO: 9.5 % — SIGNIFICANT CHANGE UP (ref 2–14)
NEUTROPHILS # BLD AUTO: 6.23 K/UL — SIGNIFICANT CHANGE UP (ref 1.8–7.4)
NEUTROPHILS NFR BLD AUTO: 70.6 % — SIGNIFICANT CHANGE UP (ref 43–77)
PLATELET # BLD AUTO: 225 K/UL — SIGNIFICANT CHANGE UP (ref 150–400)
POTASSIUM SERPL-MCNC: 3.5 MMOL/L — SIGNIFICANT CHANGE UP (ref 3.5–5.3)
POTASSIUM SERPL-SCNC: 3.5 MMOL/L — SIGNIFICANT CHANGE UP (ref 3.5–5.3)
RBC # BLD: 3.43 M/UL — LOW (ref 4.2–5.8)
RBC # FLD: 14.4 % — SIGNIFICANT CHANGE UP (ref 10.3–14.5)
SODIUM SERPL-SCNC: 138 MMOL/L — SIGNIFICANT CHANGE UP (ref 135–145)
WBC # BLD: 8.83 K/UL — SIGNIFICANT CHANGE UP (ref 3.8–10.5)
WBC # FLD AUTO: 8.83 K/UL — SIGNIFICANT CHANGE UP (ref 3.8–10.5)

## 2018-07-23 PROCEDURE — 82550 ASSAY OF CK (CPK): CPT

## 2018-07-23 PROCEDURE — 85014 HEMATOCRIT: CPT

## 2018-07-23 PROCEDURE — 83036 HEMOGLOBIN GLYCOSYLATED A1C: CPT

## 2018-07-23 PROCEDURE — 84484 ASSAY OF TROPONIN QUANT: CPT

## 2018-07-23 PROCEDURE — 96375 TX/PRO/DX INJ NEW DRUG ADDON: CPT

## 2018-07-23 PROCEDURE — 82435 ASSAY OF BLOOD CHLORIDE: CPT

## 2018-07-23 PROCEDURE — 82962 GLUCOSE BLOOD TEST: CPT

## 2018-07-23 PROCEDURE — 97116 GAIT TRAINING THERAPY: CPT

## 2018-07-23 PROCEDURE — 85610 PROTHROMBIN TIME: CPT

## 2018-07-23 PROCEDURE — 85027 COMPLETE CBC AUTOMATED: CPT

## 2018-07-23 PROCEDURE — 83880 ASSAY OF NATRIURETIC PEPTIDE: CPT

## 2018-07-23 PROCEDURE — 93306 TTE W/DOPPLER COMPLETE: CPT

## 2018-07-23 PROCEDURE — 97162 PT EVAL MOD COMPLEX 30 MIN: CPT

## 2018-07-23 PROCEDURE — 84100 ASSAY OF PHOSPHORUS: CPT

## 2018-07-23 PROCEDURE — 93005 ELECTROCARDIOGRAM TRACING: CPT

## 2018-07-23 PROCEDURE — 93970 EXTREMITY STUDY: CPT

## 2018-07-23 PROCEDURE — 96374 THER/PROPH/DIAG INJ IV PUSH: CPT

## 2018-07-23 PROCEDURE — 82947 ASSAY GLUCOSE BLOOD QUANT: CPT

## 2018-07-23 PROCEDURE — 83605 ASSAY OF LACTIC ACID: CPT

## 2018-07-23 PROCEDURE — 83735 ASSAY OF MAGNESIUM: CPT

## 2018-07-23 PROCEDURE — 80048 BASIC METABOLIC PNL TOTAL CA: CPT

## 2018-07-23 PROCEDURE — 82330 ASSAY OF CALCIUM: CPT

## 2018-07-23 PROCEDURE — 85730 THROMBOPLASTIN TIME PARTIAL: CPT

## 2018-07-23 PROCEDURE — 84132 ASSAY OF SERUM POTASSIUM: CPT

## 2018-07-23 PROCEDURE — 84295 ASSAY OF SERUM SODIUM: CPT

## 2018-07-23 PROCEDURE — 93308 TTE F-UP OR LMTD: CPT

## 2018-07-23 PROCEDURE — 80053 COMPREHEN METABOLIC PANEL: CPT

## 2018-07-23 PROCEDURE — 82553 CREATINE MB FRACTION: CPT

## 2018-07-23 PROCEDURE — 82803 BLOOD GASES ANY COMBINATION: CPT

## 2018-07-23 PROCEDURE — 99285 EMERGENCY DEPT VISIT HI MDM: CPT | Mod: 25

## 2018-07-23 PROCEDURE — 71045 X-RAY EXAM CHEST 1 VIEW: CPT

## 2018-07-23 PROCEDURE — 94660 CPAP INITIATION&MGMT: CPT

## 2018-07-23 PROCEDURE — 99239 HOSP IP/OBS DSCHRG MGMT >30: CPT

## 2018-07-23 RX ORDER — MEMANTINE HYDROCHLORIDE 10 MG/1
1 TABLET ORAL
Qty: 0 | Refills: 0 | COMMUNITY

## 2018-07-23 RX ORDER — DOCUSATE SODIUM 100 MG
1 CAPSULE ORAL
Qty: 0 | Refills: 0 | DISCHARGE
Start: 2018-07-23

## 2018-07-23 RX ORDER — SENNA PLUS 8.6 MG/1
2 TABLET ORAL
Qty: 0 | Refills: 0 | DISCHARGE
Start: 2018-07-23

## 2018-07-23 RX ORDER — FUROSEMIDE 40 MG
1 TABLET ORAL
Qty: 0 | Refills: 0 | DISCHARGE
Start: 2018-07-23

## 2018-07-23 RX ADMIN — HEPARIN SODIUM 5000 UNIT(S): 5000 INJECTION INTRAVENOUS; SUBCUTANEOUS at 13:50

## 2018-07-23 RX ADMIN — HEPARIN SODIUM 5000 UNIT(S): 5000 INJECTION INTRAVENOUS; SUBCUTANEOUS at 05:25

## 2018-07-23 RX ADMIN — DULOXETINE HYDROCHLORIDE 30 MILLIGRAM(S): 30 CAPSULE, DELAYED RELEASE ORAL at 11:58

## 2018-07-23 RX ADMIN — PRAMIPEXOLE DIHYDROCHLORIDE 0.25 MILLIGRAM(S): 0.12 TABLET ORAL at 13:50

## 2018-07-23 RX ADMIN — Medication 81 MILLIGRAM(S): at 11:58

## 2018-07-23 RX ADMIN — Medication 112 MICROGRAM(S): at 05:24

## 2018-07-23 RX ADMIN — Medication 5: at 11:57

## 2018-07-23 RX ADMIN — AMLODIPINE BESYLATE 10 MILLIGRAM(S): 2.5 TABLET ORAL at 05:24

## 2018-07-23 RX ADMIN — Medication 40 MILLIGRAM(S): at 05:25

## 2018-07-23 RX ADMIN — Medication 3: at 08:01

## 2018-07-23 RX ADMIN — ATENOLOL 50 MILLIGRAM(S): 25 TABLET ORAL at 05:24

## 2018-07-23 RX ADMIN — PRAMIPEXOLE DIHYDROCHLORIDE 0.25 MILLIGRAM(S): 0.12 TABLET ORAL at 05:24

## 2018-07-23 RX ADMIN — Medication 100 MILLIGRAM(S): at 05:24

## 2018-07-23 NOTE — PROGRESS NOTE ADULT - PROBLEM SELECTOR PLAN 2
No agitations overnight. not requiring any meds.  Currently AAOx3 responsive. Understands he is going to rehab.

## 2018-07-23 NOTE — PROGRESS NOTE ADULT - PROBLEM SELECTOR PLAN 1
- Clinically much improved. Lasix changed to 40mg BID yesterday.   Over 9 liters net negative during hosp stay.   - not on ACE-I or ARB due to CKD  - Echo with diastolic disfunction, mild apical hypokinesis  - LE dopplers negative

## 2018-07-23 NOTE — PROGRESS NOTE ADULT - PROBLEM SELECTOR PLAN 3
Converted to a fib, now in NSR  - c/w Atenolol for rate control.   - CHADSvasc 5. risk/benefit ratio of anticoagulation discussed. Wife states that pt tends to fall frequently and is worried about risk of bleeding if AC is initiated. Would like to refrain from initiating at this time.

## 2018-07-23 NOTE — PROGRESS NOTE ADULT - SUBJECTIVE AND OBJECTIVE BOX
SUBJECTIVE:  pt seen and examined, no complaints on exam.     ROS - . on exam    amLODIPine   Tablet 10 milliGRAM(s) Oral daily  aspirin enteric coated 81 milliGRAM(s) Oral daily  ATENolol  Tablet 50 milliGRAM(s) Oral daily  dextrose 40% Gel 15 Gram(s) Oral once PRN  dextrose 5%. 1000 milliLiter(s) IV Continuous <Continuous>  dextrose 50% Injectable 12.5 Gram(s) IV Push once  dextrose 50% Injectable 25 Gram(s) IV Push once  dextrose 50% Injectable 25 Gram(s) IV Push once  docusate sodium 100 milliGRAM(s) Oral three times a day  donepezil 10 milliGRAM(s) Oral at bedtime  DULoxetine 30 milliGRAM(s) Oral daily  furosemide    Tablet 40 milliGRAM(s) Oral two times a day  glucagon  Injectable 1 milliGRAM(s) IntraMuscular once PRN  heparin  Injectable 5000 Unit(s) SubCutaneous every 8 hours  insulin lispro (HumaLOG) corrective regimen sliding scale   SubCutaneous three times a day before meals  insulin lispro (HumaLOG) corrective regimen sliding scale   SubCutaneous at bedtime  levothyroxine 112 MICROGram(s) Oral daily  pramipexole 0.25 milliGRAM(s) Oral three times a day  QUEtiapine 50 milliGRAM(s) Oral at bedtime  senna 2 Tablet(s) Oral at bedtime  simvastatin 5 milliGRAM(s) Oral at bedtime                            10.3   8.75  )-----------( 242      ( 22 Jul 2018 07:59 )             31.8       Hemoglobin: 10.3 g/dL (07-22 @ 07:59)  Hemoglobin: 11.3 g/dL (07-21 @ 08:56)  Hemoglobin: 11.4 g/dL (07-20 @ 08:12)  Hemoglobin: 10.4 g/dL (07-19 @ 08:17)      07-22    142  |  94<L>  |  58<H>  ----------------------------<  214<H>  3.2<L>   |  33<H>  |  2.02<H>    Ca    8.6      22 Jul 2018 06:54      Creatinine Trend: 2.02<--, 1.99<--, 1.99<--, 1.91<--, 1.95<--, 1.70<--    COAGS:     CARDIAC MARKERS ( 21 Jul 2018 02:32 )  x     / x     / 231 U/L / x     / 5.4 ng/mL        T(C): 36.7 (07-23-18 @ 04:17), Max: 36.7 (07-23-18 @ 04:17)  HR: 64 (07-23-18 @ 05:24) (62 - 71)  BP: 138/63 (07-23-18 @ 05:24) (138/63 - 174/68)  RR: 18 (07-23-18 @ 04:17) (18 - 18)  SpO2: 100% (07-23-18 @ 04:17) (99% - 100%)  Wt(kg): --    I&O's Summary    21 Jul 2018 07:01  -  22 Jul 2018 07:00  --------------------------------------------------------  IN: 720 mL / OUT: 2550 mL / NET: -1830 mL    22 Jul 2018 07:01  -  23 Jul 2018 06:39  --------------------------------------------------------  IN: 760 mL / OUT: 1800 mL / NET: -1040 mL        Cardiovascular: Normal S1S2, No JVD, 1/6 KHADAR, Peripheral pulses palpable 2+ B/L  Respiratory: diminished at bases normal effort  Gastrointestinal: Abdomen soft, ND, NT, +BS  extremities no c/c/e      DIAGNOSTIC DATA    RADIOLOGY:   < from: Xray Chest 1 View AP/PA (07.17.18 @ 16:13) >  The mediastinal cardiac silhouette is unremarkable.    Bilateral reticular opacities in a perihilar distribution. The   differential includes edema versus infection. Correlate clinically and   follow to resolution.    No acute osseous finding.       < from: Transthoracic Echocardiogram (02.17.18 @ 07:15) >  OBSERVATIONS:  Mitral Valve: Normal mitral valve. Trace mitral  regurgitation.  Aortic Root: Aortic Root: 3.3 cm.    Aortic Valve: Normal trileaflet aortic valve. Trace aortic  regurgitation.  Left Ventricle: Normal Left Ventricular Systolic Function,  (EF = 55 to 60%) Normal left ventricular internal  dimensions and wall thicknesses. Grade II diastolic  dysfunction.  Right Heart: Normal right atrium. Normal right ventricular  size and function. There is trace tricuspidregurgitation.  There is trace pulmonic regurgitation.  Pericardium/PleuraNormal pericardium with no pericardial  effusion.        ASSESSMENT AND PLAN:    88 yo M with history of HTN, DM, CKD, dementia, HFpEF who is being seen for heart failure and PAF.  The pt. was admitted with orthopnea and LE edema and was found to have pulmonary edema on cxr.  He was briefly placed on BIPAP  and was diuresed with improvement in symptoms.  The patient was transferred to telemetry where he was found to have new onset AF which self converted to SR.  Cardiology consulted to further evaluate.  The patient denies chest pain, palpitations, syncope.  He reports improvement in dyspnea since admission.  Historically he has normal LV function with no prior ischemic evaluation.     cont ASA, statin ,   keep net neg with IV lasix       - diuresing well  - Echo: mild apical hypokinesis   - given elevated chads-vasc score, would recommend lifelong ac if no contraindications, held due to high fall risk.  - [facility] discussion ongoing   D/W Dr Armstrong

## 2018-07-23 NOTE — PROGRESS NOTE ADULT - PROBLEM SELECTOR PLAN 4
- c/w Norvasc 10, Atenolol 50.   - If BP remains elevated would transition from Atenolol to Coreg 12.5mg for better BP control

## 2018-07-23 NOTE — PROGRESS NOTE ADULT - SUBJECTIVE AND OBJECTIVE BOX
Patient is a 89y old  Male who presents with a chief complaint of SOB (20 Jul 2018 13:36)        SUBJECTIVE / OVERNIGHT EVENTS:  Feels well. wants to go home. Denies any SOB or chest pain.   No acute overnight issues or agitation.     MEDICATIONS  (STANDING):  amLODIPine   Tablet 10 milliGRAM(s) Oral daily  aspirin enteric coated 81 milliGRAM(s) Oral daily  ATENolol  Tablet 50 milliGRAM(s) Oral daily  dextrose 5%. 1000 milliLiter(s) (50 mL/Hr) IV Continuous <Continuous>  dextrose 50% Injectable 12.5 Gram(s) IV Push once  dextrose 50% Injectable 25 Gram(s) IV Push once  dextrose 50% Injectable 25 Gram(s) IV Push once  docusate sodium 100 milliGRAM(s) Oral three times a day  donepezil 10 milliGRAM(s) Oral at bedtime  DULoxetine 30 milliGRAM(s) Oral daily  furosemide    Tablet 40 milliGRAM(s) Oral two times a day  heparin  Injectable 5000 Unit(s) SubCutaneous every 8 hours  insulin lispro (HumaLOG) corrective regimen sliding scale   SubCutaneous three times a day before meals  insulin lispro (HumaLOG) corrective regimen sliding scale   SubCutaneous at bedtime  levothyroxine 112 MICROGram(s) Oral daily  pramipexole 0.25 milliGRAM(s) Oral three times a day  QUEtiapine 50 milliGRAM(s) Oral at bedtime  senna 2 Tablet(s) Oral at bedtime  simvastatin 5 milliGRAM(s) Oral at bedtime    MEDICATIONS  (PRN):  dextrose 40% Gel 15 Gram(s) Oral once PRN Blood Glucose LESS THAN 70 milliGRAM(s)/deciliter  glucagon  Injectable 1 milliGRAM(s) IntraMuscular once PRN Glucose LESS THAN 70 milligrams/deciliter      Vital Signs Last 24 Hrs  T(C): 36.4 (23 Jul 2018 11:07), Max: 36.7 (23 Jul 2018 04:17)  T(F): 97.6 (23 Jul 2018 11:07), Max: 98.1 (23 Jul 2018 04:17)  HR: 58 (23 Jul 2018 11:07) (58 - 71)  BP: 154/64 (23 Jul 2018 11:07) (138/63 - 174/68)  BP(mean): --  RR: 18 (23 Jul 2018 11:07) (16 - 20)  SpO2: 97% (23 Jul 2018 11:07) (92% - 100%)  CAPILLARY BLOOD GLUCOSE      POCT Blood Glucose.: 256 mg/dL (23 Jul 2018 07:47)  POCT Blood Glucose.: 243 mg/dL (22 Jul 2018 22:15)  POCT Blood Glucose.: 294 mg/dL (22 Jul 2018 21:15)  POCT Blood Glucose.: 260 mg/dL (22 Jul 2018 16:33)    I&O's Summary    22 Jul 2018 07:01  -  23 Jul 2018 07:00  --------------------------------------------------------  IN: 880 mL / OUT: 1800 mL / NET: -920 mL    23 Jul 2018 07:01  -  23 Jul 2018 11:43  --------------------------------------------------------  IN: 240 mL / OUT: 400 mL / NET: -160 mL          PHYSICAL EXAM  GENERAL: NAD, well-developed  HEAD:  Atraumatic, Normocephalic  EYES: EOMI, conjunctiva and sclera clear  NECK: Supple, No JVD  CHEST/LUNG: Clear to auscultation bilaterally; No wheeze  HEART: Regular rate and rhythm; No murmurs, rubs, or gallops  ABDOMEN: Soft, Nontender, Nondistended; Bowel sounds present  EXTREMITIES:  No clubbing, cyanosis, or edema  PSYCH: AAOx3  SKIN: No rashes or lesions    LABS:                        10.5   8.83  )-----------( 225      ( 23 Jul 2018 07:40 )             31.9     07-23    138  |  90<L>  |  48<H>  ----------------------------<  239<H>  3.5   |  33<H>  |  1.60<H>    Ca    8.8      23 Jul 2018 06:52                  RADIOLOGY & ADDITIONAL TESTS:    Imaging Personally Reviewed:  Consultant(s) Notes Reviewed:  card  Care Discussed with Consultants/Other Providers:

## 2018-07-23 NOTE — PROGRESS NOTE ADULT - ATTENDING COMMENTS
Patient seen and examined, agree with above assessment and plan as transcribed above.    - f/u viky Florentino MD, FACC
Patient seen and examined.  Agree with above.   -TTE with new mild apical hypokinesis  -I had a long discussion with the patient and the patient's family/wife who want conservative care.  They do not wish to pursue invasive cardiac procedures at this time given his GOC, which is not unreasonable given his age, poor functional status, and dementia.   -Will therefore provide medical management of mild cardiomyopathy  -AC ideally recommended for PAF if no contraindications    Johnny Armstrong MD
Patient seen and examined.  Agree with above.   -check tte    Johnny Armstrong MD
Patient seen and examined.  Agree with above.   -tte with mild apical hypokinesis  -obtain prior ischemic eval  -lifelong ac if no contraindications and within goc  -obtain goc    Johnny Armstrong MD
Patient seen and examined.  Agree with above.   -echo with mild segmental lv dysfunction  -follow up goc discussions prior to further cardiac workup    Johnny Armstrong MD
D/c planning to rehab today .  D/c time 40 mins.

## 2018-08-16 ENCOUNTER — APPOINTMENT (OUTPATIENT)
Dept: GERIATRICS | Facility: CLINIC | Age: 83
End: 2018-08-16
Payer: MEDICARE

## 2018-08-16 VITALS
SYSTOLIC BLOOD PRESSURE: 152 MMHG | OXYGEN SATURATION: 96 % | BODY MASS INDEX: 22.5 KG/M2 | DIASTOLIC BLOOD PRESSURE: 60 MMHG | HEART RATE: 64 BPM | TEMPERATURE: 97.8 F | WEIGHT: 148 LBS

## 2018-08-16 VITALS — DIASTOLIC BLOOD PRESSURE: 60 MMHG | SYSTOLIC BLOOD PRESSURE: 130 MMHG
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
## 2018-08-16 DIAGNOSIS — E53.8 DEFICIENCY OF OTHER SPECIFIED B GROUP VITAMINS: ICD-10-CM

## 2018-08-16 DIAGNOSIS — E78.5 HYPERLIPIDEMIA, UNSPECIFIED: ICD-10-CM

## 2018-08-16 DIAGNOSIS — Z80.2 FAMILY HISTORY OF MALIGNANT NEOPLASM OF OTHER RESPIRATORY AND INTRATHORACIC ORGANS: ICD-10-CM

## 2018-08-16 DIAGNOSIS — Z82.3 FAMILY HISTORY OF STROKE: ICD-10-CM

## 2018-08-16 DIAGNOSIS — Z87.891 PERSONAL HISTORY OF NICOTINE DEPENDENCE: ICD-10-CM

## 2018-08-16 DIAGNOSIS — Z87.898 PERSONAL HISTORY OF OTHER SPECIFIED CONDITIONS: ICD-10-CM

## 2018-08-16 PROCEDURE — 99214 OFFICE O/P EST MOD 30 MIN: CPT

## 2018-08-16 RX ORDER — POTASSIUM CHLORIDE 1500 MG/1
20 TABLET, EXTENDED RELEASE ORAL
Qty: 90 | Refills: 0 | Status: DISCONTINUED | COMMUNITY
Start: 2017-10-31 | End: 2018-08-16

## 2018-08-16 RX ORDER — ALBUTEROL SULFATE 90 UG/1
108 (90 BASE) AEROSOL, METERED RESPIRATORY (INHALATION)
Qty: 1 | Refills: 5 | Status: DISCONTINUED | COMMUNITY
Start: 2018-06-04 | End: 2018-08-16

## 2018-08-16 RX ORDER — ACETAMINOPHEN/DIPHENHYDRAMINE 500MG-25MG
1000 TABLET ORAL
Qty: 90 | Refills: 3 | Status: DISCONTINUED | COMMUNITY
Start: 2018-06-04 | End: 2018-08-16

## 2018-08-16 RX ORDER — DULOXETINE HYDROCHLORIDE 30 MG/1
30 CAPSULE, DELAYED RELEASE PELLETS ORAL
Refills: 0 | Status: ACTIVE | COMMUNITY
Start: 2017-08-16

## 2018-08-17 PROBLEM — E53.8 VITAMIN B12 DEFICIENCY: Status: ACTIVE | Noted: 2018-06-04

## 2018-08-17 PROBLEM — E78.5 HLD (HYPERLIPIDEMIA): Status: ACTIVE | Noted: 2018-06-04

## 2018-08-17 PROBLEM — Z80.2 FAMILY HISTORY OF MALIGNANT NEOPLASM OF LARYNX: Status: ACTIVE | Noted: 2018-06-07

## 2018-08-17 PROBLEM — Z87.898 HISTORY OF MULTIPLE PULMONARY NODULES: Status: RESOLVED | Noted: 2018-02-15 | Resolved: 2018-08-17
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
## 2018-08-17 PROBLEM — Z87.891 FORMER SMOKER: Status: RESOLVED | Noted: 2018-02-15 | Resolved: 2018-08-17

## 2018-08-17 PROBLEM — Z82.3 FAMILY HISTORY OF CEREBROVASCULAR ACCIDENT (CVA): Status: ACTIVE | Noted: 2018-06-07

## 2018-08-17 LAB
25(OH)D3 SERPL-MCNC: 33.7 NG/ML
ANION GAP SERPL CALC-SCNC: 18 MMOL/L
BUN SERPL-MCNC: 32 MG/DL
CALCIUM SERPL-MCNC: 9.4 MG/DL
CHLORIDE SERPL-SCNC: 99 MMOL/L
CO2 SERPL-SCNC: 28 MMOL/L
CREAT SERPL-MCNC: 1.55 MG/DL
GLUCOSE SERPL-MCNC: 232 MG/DL
HBA1C MFR BLD HPLC: 7.6 %
NT-PROBNP SERPL-MCNC: 2972 PG/ML
POTASSIUM SERPL-SCNC: 3.6 MMOL/L
SODIUM SERPL-SCNC: 145 MMOL/L

## 2018-08-30 ENCOUNTER — NON-APPOINTMENT (OUTPATIENT)
Age: 83
End: 2018-08-30

## 2018-08-30 ENCOUNTER — APPOINTMENT (OUTPATIENT)
Dept: CARDIOLOGY | Facility: CLINIC | Age: 83
End: 2018-08-30
Payer: MEDICARE

## 2018-08-30 VITALS
HEART RATE: 61 BPM | BODY MASS INDEX: 22.5 KG/M2 | OXYGEN SATURATION: 95 % | SYSTOLIC BLOOD PRESSURE: 119 MMHG | DIASTOLIC BLOOD PRESSURE: 60 MMHG | WEIGHT: 148 LBS

## 2018-08-30 PROCEDURE — 93000 ELECTROCARDIOGRAM COMPLETE: CPT

## 2018-08-30 PROCEDURE — 99214 OFFICE O/P EST MOD 30 MIN: CPT

## 2018-09-12 ENCOUNTER — RX RENEWAL (OUTPATIENT)
Age: 83
End: 2018-09-12

## 2018-09-12 RX ORDER — AMLODIPINE BESYLATE 10 MG/1
10 TABLET ORAL DAILY
Qty: 90 | Refills: 3 | Status: ACTIVE | COMMUNITY
Start: 2017-06-28 | End: 1900-01-01

## 2018-09-13 ENCOUNTER — APPOINTMENT (OUTPATIENT)
Dept: GERIATRICS | Facility: CLINIC | Age: 83
End: 2018-09-13

## 2018-09-27 ENCOUNTER — APPOINTMENT (OUTPATIENT)
Dept: GERIATRICS | Facility: CLINIC | Age: 83
End: 2018-09-27
Payer: MEDICARE

## 2018-09-27 DIAGNOSIS — Z23 ENCOUNTER FOR IMMUNIZATION: ICD-10-CM

## 2018-09-27 PROCEDURE — G0008: CPT

## 2018-09-27 PROCEDURE — 90686 IIV4 VACC NO PRSV 0.5 ML IM: CPT

## 2018-10-10 ENCOUNTER — APPOINTMENT (OUTPATIENT)
Dept: CARDIOLOGY | Facility: CLINIC | Age: 83
End: 2018-10-10

## 2018-11-15 ENCOUNTER — APPOINTMENT (OUTPATIENT)
Dept: GERIATRICS | Facility: CLINIC | Age: 83
End: 2018-11-15
Payer: MEDICARE

## 2018-11-15 VITALS
HEART RATE: 57 BPM | HEIGHT: 68 IN | RESPIRATION RATE: 15 BRPM | OXYGEN SATURATION: 96 % | DIASTOLIC BLOOD PRESSURE: 60 MMHG | SYSTOLIC BLOOD PRESSURE: 110 MMHG | TEMPERATURE: 96.7 F

## 2018-11-15 DIAGNOSIS — G25.81 RESTLESS LEGS SYNDROME: ICD-10-CM

## 2018-11-15 DIAGNOSIS — D64.9 ANEMIA, UNSPECIFIED: ICD-10-CM

## 2018-11-15 PROCEDURE — 99214 OFFICE O/P EST MOD 30 MIN: CPT | Mod: GC

## 2018-11-15 RX ORDER — LOSARTAN POTASSIUM 100 MG/1
100 TABLET, FILM COATED ORAL
Qty: 90 | Refills: 0 | Status: DISCONTINUED | COMMUNITY
Start: 2017-09-26 | End: 2018-11-15

## 2018-11-15 RX ORDER — GLIPIZIDE 5 MG/1
5 TABLET ORAL DAILY
Refills: 0 | Status: DISCONTINUED | COMMUNITY
Start: 2017-08-10 | End: 2018-11-15

## 2018-11-15 RX ORDER — BLOOD-GLUCOSE METER
W/DEVICE EACH MISCELLANEOUS
Qty: 1 | Refills: 0 | Status: ACTIVE | COMMUNITY
Start: 2018-06-04

## 2018-11-15 RX ORDER — AMILORIDE HYDROCHLORIDE AND HYDROCHLOROTHIAZIDE 5; 50 MG/1; MG/1
5-50 TABLET ORAL
Qty: 90 | Refills: 0 | Status: DISCONTINUED | COMMUNITY
Start: 2017-12-19 | End: 2018-11-15

## 2018-11-19 LAB
ALBUMIN SERPL ELPH-MCNC: 4.2 G/DL
ALP BLD-CCNC: 104 U/L
ALT SERPL-CCNC: 15 U/L
ANION GAP SERPL CALC-SCNC: 15 MMOL/L
AST SERPL-CCNC: 22 U/L
BILIRUB SERPL-MCNC: 0.3 MG/DL
BUN SERPL-MCNC: 21 MG/DL
CALCIUM SERPL-MCNC: 9.2 MG/DL
CHLORIDE SERPL-SCNC: 101 MMOL/L
CHOLEST SERPL-MCNC: 131 MG/DL
CHOLEST/HDLC SERPL: 3.7 RATIO
CO2 SERPL-SCNC: 29 MMOL/L
CREAT SERPL-MCNC: 1.48 MG/DL
GLUCOSE SERPL-MCNC: 157 MG/DL
HBA1C MFR BLD HPLC: 8.3 %
HDLC SERPL-MCNC: 35 MG/DL
LDLC SERPL CALC-MCNC: 45 MG/DL
POTASSIUM SERPL-SCNC: 4.2 MMOL/L
PROT SERPL-MCNC: 6.9 G/DL
SODIUM SERPL-SCNC: 145 MMOL/L
TRIGL SERPL-MCNC: 253 MG/DL
TSH SERPL-ACNC: 3.06 UIU/ML

## 2018-12-11 ENCOUNTER — RX RENEWAL (OUTPATIENT)
Age: 83
End: 2018-12-11

## 2018-12-18 NOTE — ED PROCEDURE NOTE - PROCEDURE ADDITIONAL DETAILS
Emergency Department Focused Ultrasound performed at patient's bedside.  The complete report can be found in PACS. I have personally evaluated and examined the patient. The Attending was available to me as a supervising provider if needed.

## 2019-01-23 ENCOUNTER — NON-APPOINTMENT (OUTPATIENT)
Age: 84
End: 2019-01-23

## 2019-01-23 ENCOUNTER — APPOINTMENT (OUTPATIENT)
Dept: CARDIOLOGY | Facility: CLINIC | Age: 84
End: 2019-01-23
Payer: MEDICARE

## 2019-01-23 VITALS
OXYGEN SATURATION: 97 % | DIASTOLIC BLOOD PRESSURE: 63 MMHG | BODY MASS INDEX: 21.82 KG/M2 | HEART RATE: 61 BPM | HEIGHT: 68 IN | SYSTOLIC BLOOD PRESSURE: 163 MMHG | WEIGHT: 144 LBS

## 2019-01-23 VITALS — SYSTOLIC BLOOD PRESSURE: 144 MMHG | DIASTOLIC BLOOD PRESSURE: 60 MMHG

## 2019-01-23 PROCEDURE — 93000 ELECTROCARDIOGRAM COMPLETE: CPT

## 2019-01-23 PROCEDURE — 99214 OFFICE O/P EST MOD 30 MIN: CPT

## 2019-01-23 RX ORDER — MEMANTINE HYDROCHLORIDE 10 MG/1
10 TABLET, FILM COATED ORAL
Qty: 180 | Refills: 0 | Status: ACTIVE | COMMUNITY
Start: 2018-12-26

## 2019-01-23 NOTE — REVIEW OF SYSTEMS
[Lower Ext Edema] : lower extremity edema [see HPI] : see HPI [Wheezing] : wheezing [Negative] : Heme/Lymph [Shortness Of Breath] : no shortness of breath [Chest Pain] : no chest pain [Palpitations] : no palpitations

## 2019-01-23 NOTE — PHYSICAL EXAM
[General Appearance - Well Developed] : well developed [Normal Appearance] : normal appearance [Well Groomed] : well groomed [General Appearance - Well Nourished] : well nourished [No Deformities] : no deformities [General Appearance - In No Acute Distress] : no acute distress [Normal Conjunctiva] : the conjunctiva exhibited no abnormalities [Eyelids - No Xanthelasma] : the eyelids demonstrated no xanthelasmas [Normal Oral Mucosa] : normal oral mucosa [No Oral Pallor] : no oral pallor [No Oral Cyanosis] : no oral cyanosis [Normal Jugular Venous A Waves Present] : normal jugular venous A waves present [Normal Jugular Venous V Waves Present] : normal jugular venous V waves present [No Jugular Venous Bello A Waves] : no jugular venous bello A waves [Respiration, Rhythm And Depth] : normal respiratory rhythm and effort [Exaggerated Use Of Accessory Muscles For Inspiration] : no accessory muscle use [Auscultation Breath Sounds / Voice Sounds] : lungs were clear to auscultation bilaterally [Heart Rate And Rhythm] : heart rate and rhythm were normal [Heart Sounds] : normal S1 and S2 [Murmurs] : no murmurs present [Abdomen Soft] : soft [Abdomen Tenderness] : non-tender [Abdomen Mass (___ Cm)] : no abdominal mass palpated [Abnormal Walk] : normal gait [Gait - Sufficient For Exercise Testing] : the gait was sufficient for exercise testing [Nail Clubbing] : no clubbing of the fingernails [Cyanosis, Localized] : no localized cyanosis [Petechial Hemorrhages (___cm)] : no petechial hemorrhages [Skin Color & Pigmentation] : normal skin color and pigmentation [] : no rash [No Venous Stasis] : no venous stasis [Skin Lesions] : no skin lesions [No Skin Ulcers] : no skin ulcer [No Xanthoma] : no  xanthoma was observed [Oriented To Time, Place, And Person] : oriented to person, place, and time [Affect] : the affect was normal [Mood] : the mood was normal [No Anxiety] : not feeling anxious

## 2019-01-23 NOTE — REASON FOR VISIT
[FreeTextEntry1] : The  patient comes in for followup of his congestive heart failure, petal edema, hypertension, having been on 40 mg of furosemide daily. He denies any chest pains,  or palpitations. His legs are less  swollen. There is trace to 1+ edema .

## 2019-01-23 NOTE — DISCUSSION/SUMMARY
[FreeTextEntry1] : The patient was examined. His blood pressure was 144/60, and his pulse was 61. His lung exam was clear to auscultation.The cardiac exam was negative for murmurs rubs or gallops. The remainder of his physical exam revealed decreased hearing bilaterally and dementia. His EKG showed normal sinus rhythm, first-degree heart block,,poor R-wave progression, nonspecific ST and T wave changes. No acute changes were seen. The patient will stay on furosemide  40 mg once a day. If his legs swell up or he becomes more short of breath he may take a second furosemide on that day. All other medications remain the same. He will return in 4 months, or earlier if needed.

## 2019-03-02 ENCOUNTER — RX RENEWAL (OUTPATIENT)
Age: 84
End: 2019-03-02

## 2019-03-02 RX ORDER — QUETIAPINE FUMARATE 25 MG/1
25 TABLET ORAL
Qty: 180 | Refills: 0 | Status: ACTIVE | COMMUNITY
Start: 2018-02-06 | End: 1900-01-01

## 2019-03-21 ENCOUNTER — APPOINTMENT (OUTPATIENT)
Dept: GERIATRICS | Facility: CLINIC | Age: 84
End: 2019-03-21
Payer: MEDICARE

## 2019-03-21 VITALS
SYSTOLIC BLOOD PRESSURE: 148 MMHG | HEART RATE: 64 BPM | HEIGHT: 69.6 IN | DIASTOLIC BLOOD PRESSURE: 60 MMHG | OXYGEN SATURATION: 95 % | RESPIRATION RATE: 16 BRPM | WEIGHT: 146.6 LBS | BODY MASS INDEX: 21.23 KG/M2

## 2019-03-21 DIAGNOSIS — R26.81 UNSTEADINESS ON FEET: ICD-10-CM

## 2019-03-21 DIAGNOSIS — E03.9 HYPOTHYROIDISM, UNSPECIFIED: ICD-10-CM

## 2019-03-21 DIAGNOSIS — E87.6 HYPOKALEMIA: ICD-10-CM

## 2019-03-21 DIAGNOSIS — F02.81 ALZHEIMER'S DISEASE, UNSPECIFIED: ICD-10-CM

## 2019-03-21 DIAGNOSIS — G30.9 ALZHEIMER'S DISEASE, UNSPECIFIED: ICD-10-CM

## 2019-03-21 DIAGNOSIS — E11.49 TYPE 2 DIABETES MELLITUS WITH OTHER DIABETIC NEUROLOGICAL COMPLICATION: ICD-10-CM

## 2019-03-21 DIAGNOSIS — Z79.4 TYPE 2 DIABETES MELLITUS WITH OTHER DIABETIC NEUROLOGICAL COMPLICATION: ICD-10-CM

## 2019-03-21 PROCEDURE — 99214 OFFICE O/P EST MOD 30 MIN: CPT

## 2019-03-21 RX ORDER — INSULIN GLARGINE 300 U/ML
300 INJECTION, SOLUTION SUBCUTANEOUS
Refills: 0 | Status: ACTIVE | COMMUNITY
Start: 2017-08-10

## 2019-03-21 RX ORDER — INSULIN LISPRO 100 [IU]/ML
100 INJECTION, SOLUTION INTRAVENOUS; SUBCUTANEOUS
Refills: 0 | Status: ACTIVE | COMMUNITY
Start: 2017-08-10

## 2019-03-21 NOTE — PHYSICAL EXAM
[General Appearance - Alert] : alert [General Appearance - In No Acute Distress] : in no acute distress [Sclera] : the sclera and conjunctiva were normal [PERRL With Normal Accommodation] : pupils were equal in size, round, and reactive to light [Extraocular Movements] : extraocular movements were intact [Normal Oral Mucosa] : normal oral mucosa [No Oral Pallor] : no oral pallor [No Oral Cyanosis] : no oral cyanosis [Outer Ear] : the ears and nose were normal in appearance [Oropharynx] : The oropharynx was normal [Neck Appearance] : the appearance of the neck was normal [Neck Cervical Mass (___cm)] : no neck mass was observed [Jugular Venous Distention Increased] : there was no jugular-venous distention [Thyroid Diffuse Enlargement] : the thyroid was not enlarged [Thyroid Nodule] : there were no palpable thyroid nodules [Auscultation Breath Sounds / Voice Sounds] : lungs were clear to auscultation bilaterally [Heart Sounds] : normal S1 and S2 [Edema] : there was no peripheral edema [Abdomen Soft] : soft [Abdomen Tenderness] : non-tender [FreeTextEntry1] : distended  [Cervical Lymph Nodes Enlarged Posterior Bilaterally] : posterior cervical [Cervical Lymph Nodes Enlarged Anterior Bilaterally] : anterior cervical [Supraclavicular Lymph Nodes Enlarged Bilaterally] : supraclavicular [Axillary Lymph Nodes Enlarged Bilaterally] : axillary [No CVA Tenderness] : no ~M costovertebral angle tenderness [No Spinal Tenderness] : no spinal tenderness [Nail Clubbing] : no clubbing  or cyanosis of the fingernails [Involuntary Movements] : no involuntary movements were seen [] : no rash [No Focal Deficits] : no focal deficits [Affect] : the affect was normal [Mood] : the mood was normal

## 2019-03-21 NOTE — REVIEW OF SYSTEMS
[Loss Of Hearing] : hearing loss [As Noted in HPI] : as noted in HPI [Negative] : Heme/Lymph [FreeTextEntry4] : refuses to wear hearing aids

## 2019-03-21 NOTE — HISTORY OF PRESENT ILLNESS
[FreeTextEntry1] : Mr. Willie Estrella is an 89-year-old gentleman with multiple medical problems presenting for followup. Mrs. Estrella is present and provides most of the history. She states that physically he's been doing very well. He has not experienced any shortness of breath and she has not noticed any leg edema. He is quite active during the day and fingers around the house. He does develop a bit more confusion during the latter part of the day. Overall his agitation and behaviors have improved, occurring with significantly less frequency. He has a home health aide between 9 AM and 7 PM. He usually sleeps between 12 and 4 to 5 AM, but naps during the day. He has not exhibited any aggressive behavior towards his caregivers or his wife.

## 2019-03-22 PROBLEM — E87.6 HYPOKALEMIA: Status: ACTIVE | Noted: 2019-03-22

## 2019-03-22 LAB
ANION GAP SERPL CALC-SCNC: 18 MMOL/L
BUN SERPL-MCNC: 44 MG/DL
CALCIUM SERPL-MCNC: 9 MG/DL
CHLORIDE SERPL-SCNC: 96 MMOL/L
CO2 SERPL-SCNC: 24 MMOL/L
CREAT SERPL-MCNC: 1.65 MG/DL
GLUCOSE SERPL-MCNC: 301 MG/DL
HBA1C MFR BLD HPLC: 8.1 %
POTASSIUM SERPL-SCNC: 3.2 MMOL/L
SODIUM SERPL-SCNC: 138 MMOL/L

## 2019-03-22 RX ORDER — POTASSIUM CHLORIDE 1500 MG/1
20 TABLET, FILM COATED, EXTENDED RELEASE ORAL DAILY
Qty: 90 | Refills: 1 | Status: ACTIVE | COMMUNITY
Start: 2019-03-22 | End: 1900-01-01

## 2019-04-08 LAB
ANION GAP SERPL CALC-SCNC: 13 MMOL/L
BUN SERPL-MCNC: 43 MG/DL
CALCIUM SERPL-MCNC: 9.1 MG/DL
CHLORIDE SERPL-SCNC: 97 MMOL/L
CO2 SERPL-SCNC: 27 MMOL/L
CREAT SERPL-MCNC: 1.51 MG/DL
GLUCOSE SERPL-MCNC: 321 MG/DL
POTASSIUM SERPL-SCNC: 3.8 MMOL/L
SODIUM SERPL-SCNC: 137 MMOL/L

## 2019-04-23 ENCOUNTER — TRANSCRIPTION ENCOUNTER (OUTPATIENT)
Age: 84
End: 2019-04-23

## 2019-05-08 RX ORDER — FUROSEMIDE 40 MG/1
40 TABLET ORAL DAILY
Qty: 90 | Refills: 3 | Status: ACTIVE | COMMUNITY
Start: 2018-02-26 | End: 1900-01-01

## 2019-05-20 ENCOUNTER — OTHER (OUTPATIENT)
Age: 84
End: 2019-05-20

## 2019-05-28 ENCOUNTER — APPOINTMENT (OUTPATIENT)
Dept: CARDIOLOGY | Facility: CLINIC | Age: 84
End: 2019-05-28
Payer: MEDICARE

## 2019-05-28 ENCOUNTER — NON-APPOINTMENT (OUTPATIENT)
Age: 84
End: 2019-05-28

## 2019-05-28 VITALS
OXYGEN SATURATION: 96 % | HEART RATE: 70 BPM | HEIGHT: 69 IN | DIASTOLIC BLOOD PRESSURE: 70 MMHG | BODY MASS INDEX: 21.03 KG/M2 | WEIGHT: 142 LBS | SYSTOLIC BLOOD PRESSURE: 146 MMHG

## 2019-05-28 DIAGNOSIS — R60.9 EDEMA, UNSPECIFIED: ICD-10-CM

## 2019-05-28 DIAGNOSIS — I10 ESSENTIAL (PRIMARY) HYPERTENSION: ICD-10-CM

## 2019-05-28 DIAGNOSIS — I50.30 UNSPECIFIED DIASTOLIC (CONGESTIVE) HEART FAILURE: ICD-10-CM

## 2019-05-28 PROCEDURE — 99214 OFFICE O/P EST MOD 30 MIN: CPT

## 2019-05-28 PROCEDURE — 93000 ELECTROCARDIOGRAM COMPLETE: CPT

## 2019-05-28 NOTE — REVIEW OF SYSTEMS
[see HPI] : see HPI [Wheezing] : wheezing [Negative] : Heme/Lymph [Chest Pain] : no chest pain [Shortness Of Breath] : no shortness of breath [Palpitations] : no palpitations [Lower Ext Edema] : no extremity edema

## 2019-05-28 NOTE — DISCUSSION/SUMMARY
[FreeTextEntry1] : The patient was examined. His blood pressure was 146/70, and his pulse was 70. His lung exam was clear to auscultation.The cardiac exam was negative for murmurs rubs or gallops. The remainder of his physical exam revealed decreased hearing bilaterally and dementia. His EKG showed normal sinus rhythm, poor R-wave progression, nonspecific ST and T wave changes. No acute changes were seen. The patient will stay on furosemide  40 mg once a day. If his legs swell up or he becomes more short of breath he may take a second furosemide on that day. All other medications remain the same. He will return in 4 months, or earlier if needed.

## 2019-05-28 NOTE — REASON FOR VISIT
[FreeTextEntry1] : The  patient comes in for followup of his congestive heart failure, petal edema, hypertension, having been on 40 mg of furosemide daily. He denies any chest pains,  or palpitations. His legs are less  swollen. There is trace to 1+ edema . \par May 28, 2019: The patient has no new complaints. His wife passed away one week ago. He denies any chest pains, shortness of breath, or palpitations. He states that his legs are not swollen.\par

## 2019-05-28 NOTE — PHYSICAL EXAM
[General Appearance - Well Developed] : well developed [Normal Appearance] : normal appearance [Well Groomed] : well groomed [General Appearance - Well Nourished] : well nourished [No Deformities] : no deformities [General Appearance - In No Acute Distress] : no acute distress [Normal Conjunctiva] : the conjunctiva exhibited no abnormalities [Eyelids - No Xanthelasma] : the eyelids demonstrated no xanthelasmas [Normal Oral Mucosa] : normal oral mucosa [No Oral Pallor] : no oral pallor [No Oral Cyanosis] : no oral cyanosis [Normal Jugular Venous A Waves Present] : normal jugular venous A waves present [Normal Jugular Venous V Waves Present] : normal jugular venous V waves present [No Jugular Venous Bello A Waves] : no jugular venous bello A waves [Respiration, Rhythm And Depth] : normal respiratory rhythm and effort [Exaggerated Use Of Accessory Muscles For Inspiration] : no accessory muscle use [Auscultation Breath Sounds / Voice Sounds] : lungs were clear to auscultation bilaterally [Heart Rate And Rhythm] : heart rate and rhythm were normal [Murmurs] : no murmurs present [Heart Sounds] : normal S1 and S2 [Abdomen Soft] : soft [Abdomen Tenderness] : non-tender [Abdomen Mass (___ Cm)] : no abdominal mass palpated [Abnormal Walk] : normal gait [Gait - Sufficient For Exercise Testing] : the gait was sufficient for exercise testing [Nail Clubbing] : no clubbing of the fingernails [Cyanosis, Localized] : no localized cyanosis [Petechial Hemorrhages (___cm)] : no petechial hemorrhages [] : no rash [Skin Color & Pigmentation] : normal skin color and pigmentation [Skin Lesions] : no skin lesions [No Skin Ulcers] : no skin ulcer [No Venous Stasis] : no venous stasis [Oriented To Time, Place, And Person] : oriented to person, place, and time [Affect] : the affect was normal [No Xanthoma] : no  xanthoma was observed [No Anxiety] : not feeling anxious [Mood] : the mood was normal

## 2019-05-29 ENCOUNTER — INPATIENT (INPATIENT)
Facility: HOSPITAL | Age: 84
LOS: 5 days | Discharge: NURSING, REHAB OR LONG-TERM CARE | End: 2019-06-04
Attending: HOSPITALIST | Admitting: HOSPITALIST
Payer: MEDICARE

## 2019-05-29 VITALS
SYSTOLIC BLOOD PRESSURE: 167 MMHG | HEART RATE: 60 BPM | DIASTOLIC BLOOD PRESSURE: 69 MMHG | OXYGEN SATURATION: 97 % | TEMPERATURE: 98 F | RESPIRATION RATE: 16 BRPM

## 2019-05-29 DIAGNOSIS — Z98.890 OTHER SPECIFIED POSTPROCEDURAL STATES: Chronic | ICD-10-CM

## 2019-05-29 LAB
APPEARANCE UR: CLEAR — SIGNIFICANT CHANGE UP
BASOPHILS # BLD AUTO: 0.04 K/UL — SIGNIFICANT CHANGE UP (ref 0–0.2)
BASOPHILS NFR BLD AUTO: 0.4 % — SIGNIFICANT CHANGE UP (ref 0–2)
BILIRUB UR-MCNC: NEGATIVE — SIGNIFICANT CHANGE UP
BLOOD UR QL VISUAL: NEGATIVE — SIGNIFICANT CHANGE UP
COLOR SPEC: SIGNIFICANT CHANGE UP
EOSINOPHIL # BLD AUTO: 0.19 K/UL — SIGNIFICANT CHANGE UP (ref 0–0.5)
EOSINOPHIL NFR BLD AUTO: 1.9 % — SIGNIFICANT CHANGE UP (ref 0–6)
GLUCOSE UR-MCNC: NEGATIVE — SIGNIFICANT CHANGE UP
HCT VFR BLD CALC: 35.3 % — LOW (ref 39–50)
HGB BLD-MCNC: 11.5 G/DL — LOW (ref 13–17)
IMM GRANULOCYTES NFR BLD AUTO: 0.3 % — SIGNIFICANT CHANGE UP (ref 0–1.5)
KETONES UR-MCNC: NEGATIVE — SIGNIFICANT CHANGE UP
LEUKOCYTE ESTERASE UR-ACNC: NEGATIVE — SIGNIFICANT CHANGE UP
LYMPHOCYTES # BLD AUTO: 2.25 K/UL — SIGNIFICANT CHANGE UP (ref 1–3.3)
LYMPHOCYTES # BLD AUTO: 22.2 % — SIGNIFICANT CHANGE UP (ref 13–44)
MCHC RBC-ENTMCNC: 31.2 PG — SIGNIFICANT CHANGE UP (ref 27–34)
MCHC RBC-ENTMCNC: 32.6 % — SIGNIFICANT CHANGE UP (ref 32–36)
MCV RBC AUTO: 95.7 FL — SIGNIFICANT CHANGE UP (ref 80–100)
MONOCYTES # BLD AUTO: 0.88 K/UL — SIGNIFICANT CHANGE UP (ref 0–0.9)
MONOCYTES NFR BLD AUTO: 8.7 % — SIGNIFICANT CHANGE UP (ref 2–14)
NEUTROPHILS # BLD AUTO: 6.74 K/UL — SIGNIFICANT CHANGE UP (ref 1.8–7.4)
NEUTROPHILS NFR BLD AUTO: 66.5 % — SIGNIFICANT CHANGE UP (ref 43–77)
NITRITE UR-MCNC: NEGATIVE — SIGNIFICANT CHANGE UP
NRBC # FLD: 0 K/UL — SIGNIFICANT CHANGE UP (ref 0–0)
PH UR: 6 — SIGNIFICANT CHANGE UP (ref 5–8)
PLATELET # BLD AUTO: 187 K/UL — SIGNIFICANT CHANGE UP (ref 150–400)
PMV BLD: 11.5 FL — SIGNIFICANT CHANGE UP (ref 7–13)
PROT UR-MCNC: 100 — HIGH
RBC # BLD: 3.69 M/UL — LOW (ref 4.2–5.8)
RBC # FLD: 13.8 % — SIGNIFICANT CHANGE UP (ref 10.3–14.5)
SP GR SPEC: 1.02 — SIGNIFICANT CHANGE UP (ref 1–1.04)
UROBILINOGEN FLD QL: NORMAL — SIGNIFICANT CHANGE UP
WBC # BLD: 10.13 K/UL — SIGNIFICANT CHANGE UP (ref 3.8–10.5)
WBC # FLD AUTO: 10.13 K/UL — SIGNIFICANT CHANGE UP (ref 3.8–10.5)

## 2019-05-29 PROCEDURE — 71045 X-RAY EXAM CHEST 1 VIEW: CPT | Mod: 26

## 2019-05-29 NOTE — ED ADULT NURSE NOTE - NSIMPLEMENTINTERV_GEN_ALL_ED
Implemented All Fall with Harm Risk Interventions:  [place] to call system. Call bell, personal items and telephone within reach. Instruct patient to call for assistance. Room bathroom lighting operational. Non-slip footwear when patient is off stretcher. Physically safe environment: no spills, clutter or unnecessary equipment. Stretcher in lowest position, wheels locked, appropriate side rails in place. Provide visual cue, wrist band, yellow gown, etc. Monitor gait and stability. Monitor for mental status changes and reorient to person, place, and time. Review medications for side effects contributing to fall risk. Reinforce activity limits and safety measures with patient and family. Provide visual clues: red socks.

## 2019-05-29 NOTE — ED PROVIDER NOTE - CLINICAL SUMMARY MEDICAL DECISION MAKING FREE TEXT BOX
89yo M with dementia no longer able to take care of himself with LoC likely 2/2 to klonopin. Will obtain labwork, imaging and likely admit for nursing home placement.

## 2019-05-29 NOTE — ED ADULT NURSE NOTE - OBJECTIVE STATEMENT
Ambulatory BIBEMS from home, son at bedside visiting from [place] acting as primary caregiver to father s/p the death of his mother last week. Patient is calm and cooperative at present, however at approx 1pm this afternoon he had an explosive violent outburst and was given 0.25 of Klonipin for behaviour control. Patient slept for approx 5 hours, woke up and had a big dinner, and as per son states that he was in his recliner at home and was unresponsive for approximately 15 minutes. Son called 911, placed the patient on his side, and as soon as EMS arrive patient was wide awake and at his baseline. SANA, , MD Resident at bedside. Safety maintained, needs attended, will continue to monitor.

## 2019-05-29 NOTE — ED ADULT TRIAGE NOTE - CHIEF COMPLAINT QUOTE
pt arrives for aggressive behavior and unresponsiveness since wife passing away a week ago. pt Son states as per PMD was able to give pt Klonopin 0.25mg earlier than his scheduled time due to his behavior. Son states after giving that dose the pt became unresponsive for about 15 mins. FSx in field [id number]. pt presently awake and alert.

## 2019-05-29 NOTE — ED PROVIDER NOTE - ATTENDING CONTRIBUTION TO CARE
89 yo male with PMH demenatia prseents to ED for evaluation of episode of syncope. Patient's wife  2 weeks ago, patient has been depressed and became agitated today. Family (who does not live with patient) reports they were recommended by PCP to administer Klonopin, shortly after patient lost consciousness and was minimally responsive. Denies injruies, falls, trauma. Family reports that patient lives alone and needs NH placement because wife used to take care of patient.     Gen: no acute distress  Cardiac: regular rate and rhythm, +S1S2  Pulm: Clear to auscultation bilaterally  Abd: soft, nontender, nondistended, no guarding    MDM  Elderly male with depression and agitation, syncope after benzodiazepine use, lives alone and unable to care for self- Will check labs, EKG, imaging, medicate, admit

## 2019-05-29 NOTE — ED PROVIDER NOTE - OBJECTIVE STATEMENT
89yo M with dementia p/w LoC earlier today. Pt's wife  2 weeks ago and he has been depressed and agitated. His PCP recommended an extra dose of clonazepam today at roughly noon. He later became minimally responsive and required EMS personnel to arrive. He woke up immediately with the personnel and had no falls/trauma. He denies any recent illnesses, fever, chills, chest pain, abdominal pain, diarrhea or other changes. Son endorses that there is nobody left to help take care of him and is looking for a nursing home.

## 2019-05-30 DIAGNOSIS — I10 ESSENTIAL (PRIMARY) HYPERTENSION: ICD-10-CM

## 2019-05-30 DIAGNOSIS — R63.8 OTHER SYMPTOMS AND SIGNS CONCERNING FOOD AND FLUID INTAKE: ICD-10-CM

## 2019-05-30 DIAGNOSIS — F03.90 UNSPECIFIED DEMENTIA WITHOUT BEHAVIORAL DISTURBANCE: ICD-10-CM

## 2019-05-30 DIAGNOSIS — G93.40 ENCEPHALOPATHY, UNSPECIFIED: ICD-10-CM

## 2019-05-30 DIAGNOSIS — Z29.9 ENCOUNTER FOR PROPHYLACTIC MEASURES, UNSPECIFIED: ICD-10-CM

## 2019-05-30 DIAGNOSIS — I50.32 CHRONIC DIASTOLIC (CONGESTIVE) HEART FAILURE: ICD-10-CM

## 2019-05-30 DIAGNOSIS — E11.9 TYPE 2 DIABETES MELLITUS WITHOUT COMPLICATIONS: ICD-10-CM

## 2019-05-30 DIAGNOSIS — Z79.899 OTHER LONG TERM (CURRENT) DRUG THERAPY: ICD-10-CM

## 2019-05-30 DIAGNOSIS — N18.9 CHRONIC KIDNEY DISEASE, UNSPECIFIED: ICD-10-CM

## 2019-05-30 LAB
ALBUMIN SERPL ELPH-MCNC: 4 G/DL — SIGNIFICANT CHANGE UP (ref 3.3–5)
ALP SERPL-CCNC: 97 U/L — SIGNIFICANT CHANGE UP (ref 40–120)
ALT FLD-CCNC: 12 U/L — SIGNIFICANT CHANGE UP (ref 4–41)
ANION GAP SERPL CALC-SCNC: 18 MMO/L — HIGH (ref 7–14)
AST SERPL-CCNC: 20 U/L — SIGNIFICANT CHANGE UP (ref 4–40)
BACTERIA # UR AUTO: NEGATIVE — SIGNIFICANT CHANGE UP
BILIRUB SERPL-MCNC: 0.2 MG/DL — SIGNIFICANT CHANGE UP (ref 0.2–1.2)
BUN SERPL-MCNC: 34 MG/DL — HIGH (ref 7–23)
CALCIUM SERPL-MCNC: 8.7 MG/DL — SIGNIFICANT CHANGE UP (ref 8.4–10.5)
CHLORIDE SERPL-SCNC: 102 MMOL/L — SIGNIFICANT CHANGE UP (ref 98–107)
CO2 SERPL-SCNC: 25 MMOL/L — SIGNIFICANT CHANGE UP (ref 22–31)
CREAT SERPL-MCNC: 1.57 MG/DL — HIGH (ref 0.5–1.3)
GLUCOSE SERPL-MCNC: 187 MG/DL — HIGH (ref 70–99)
HYALINE CASTS # UR AUTO: NEGATIVE — SIGNIFICANT CHANGE UP
MAGNESIUM SERPL-MCNC: 1.8 MG/DL — SIGNIFICANT CHANGE UP (ref 1.6–2.6)
PHOSPHATE SERPL-MCNC: 4.1 MG/DL — SIGNIFICANT CHANGE UP (ref 2.5–4.5)
POTASSIUM SERPL-MCNC: 3.6 MMOL/L — SIGNIFICANT CHANGE UP (ref 3.5–5.3)
POTASSIUM SERPL-SCNC: 3.6 MMOL/L — SIGNIFICANT CHANGE UP (ref 3.5–5.3)
PROT SERPL-MCNC: 6.8 G/DL — SIGNIFICANT CHANGE UP (ref 6–8.3)
RBC CASTS # UR COMP ASSIST: SIGNIFICANT CHANGE UP (ref 0–?)
SODIUM SERPL-SCNC: 145 MMOL/L — SIGNIFICANT CHANGE UP (ref 135–145)
SQUAMOUS # UR AUTO: SIGNIFICANT CHANGE UP
WBC UR QL: SIGNIFICANT CHANGE UP (ref 0–?)

## 2019-05-30 PROCEDURE — 99223 1ST HOSP IP/OBS HIGH 75: CPT

## 2019-05-30 PROCEDURE — 70450 CT HEAD/BRAIN W/O DYE: CPT | Mod: 26

## 2019-05-30 PROCEDURE — 71250 CT THORAX DX C-: CPT | Mod: 26

## 2019-05-30 RX ORDER — SIMVASTATIN 20 MG/1
5 TABLET, FILM COATED ORAL AT BEDTIME
Refills: 0 | Status: DISCONTINUED | OUTPATIENT
Start: 2019-05-30 | End: 2019-06-04

## 2019-05-30 RX ORDER — HEPARIN SODIUM 5000 [USP'U]/ML
5000 INJECTION INTRAVENOUS; SUBCUTANEOUS EVERY 12 HOURS
Refills: 0 | Status: DISCONTINUED | OUTPATIENT
Start: 2019-05-30 | End: 2019-06-04

## 2019-05-30 RX ORDER — INSULIN GLARGINE 100 [IU]/ML
8 INJECTION, SOLUTION SUBCUTANEOUS
Qty: 0 | Refills: 0 | DISCHARGE

## 2019-05-30 RX ORDER — DEXTROSE 50 % IN WATER 50 %
12.5 SYRINGE (ML) INTRAVENOUS ONCE
Refills: 0 | Status: DISCONTINUED | OUTPATIENT
Start: 2019-05-30 | End: 2019-06-04

## 2019-05-30 RX ORDER — INSULIN LISPRO 100/ML
VIAL (ML) SUBCUTANEOUS AT BEDTIME
Refills: 0 | Status: DISCONTINUED | OUTPATIENT
Start: 2019-05-30 | End: 2019-06-04

## 2019-05-30 RX ORDER — GLUCAGON INJECTION, SOLUTION 0.5 MG/.1ML
1 INJECTION, SOLUTION SUBCUTANEOUS ONCE
Refills: 0 | Status: DISCONTINUED | OUTPATIENT
Start: 2019-05-30 | End: 2019-06-04

## 2019-05-30 RX ORDER — INSULIN LISPRO 100/ML
8 VIAL (ML) SUBCUTANEOUS
Qty: 0 | Refills: 0 | DISCHARGE

## 2019-05-30 RX ORDER — ATENOLOL 25 MG/1
50 TABLET ORAL DAILY
Refills: 0 | Status: DISCONTINUED | OUTPATIENT
Start: 2019-05-30 | End: 2019-06-04

## 2019-05-30 RX ORDER — PRAMIPEXOLE DIHYDROCHLORIDE 0.12 MG/1
1 TABLET ORAL
Qty: 0 | Refills: 0 | DISCHARGE

## 2019-05-30 RX ORDER — DULOXETINE HYDROCHLORIDE 30 MG/1
30 CAPSULE, DELAYED RELEASE ORAL AT BEDTIME
Refills: 0 | Status: DISCONTINUED | OUTPATIENT
Start: 2019-05-30 | End: 2019-06-04

## 2019-05-30 RX ORDER — DEXTROSE 50 % IN WATER 50 %
15 SYRINGE (ML) INTRAVENOUS ONCE
Refills: 0 | Status: DISCONTINUED | OUTPATIENT
Start: 2019-05-30 | End: 2019-06-04

## 2019-05-30 RX ORDER — ASPIRIN/CALCIUM CARB/MAGNESIUM 324 MG
81 TABLET ORAL DAILY
Refills: 0 | Status: DISCONTINUED | OUTPATIENT
Start: 2019-05-30 | End: 2019-06-04

## 2019-05-30 RX ORDER — AMLODIPINE BESYLATE 2.5 MG/1
10 TABLET ORAL DAILY
Refills: 0 | Status: DISCONTINUED | OUTPATIENT
Start: 2019-05-30 | End: 2019-06-04

## 2019-05-30 RX ORDER — FUROSEMIDE 40 MG
40 TABLET ORAL DAILY
Refills: 0 | Status: DISCONTINUED | OUTPATIENT
Start: 2019-05-30 | End: 2019-06-04

## 2019-05-30 RX ORDER — INSULIN LISPRO 100/ML
5 VIAL (ML) SUBCUTANEOUS
Qty: 0 | Refills: 0 | DISCHARGE

## 2019-05-30 RX ORDER — PRAMIPEXOLE DIHYDROCHLORIDE 0.12 MG/1
0.75 TABLET ORAL AT BEDTIME
Refills: 0 | Status: DISCONTINUED | OUTPATIENT
Start: 2019-05-30 | End: 2019-06-04

## 2019-05-30 RX ORDER — DONEPEZIL HYDROCHLORIDE 10 MG/1
10 TABLET, FILM COATED ORAL AT BEDTIME
Refills: 0 | Status: DISCONTINUED | OUTPATIENT
Start: 2019-05-30 | End: 2019-06-03

## 2019-05-30 RX ORDER — LEVOTHYROXINE SODIUM 125 MCG
112 TABLET ORAL DAILY
Refills: 0 | Status: DISCONTINUED | OUTPATIENT
Start: 2019-05-30 | End: 2019-06-04

## 2019-05-30 RX ORDER — INSULIN LISPRO 100/ML
VIAL (ML) SUBCUTANEOUS
Refills: 0 | Status: DISCONTINUED | OUTPATIENT
Start: 2019-05-30 | End: 2019-06-04

## 2019-05-30 RX ORDER — SODIUM CHLORIDE 9 MG/ML
1000 INJECTION, SOLUTION INTRAVENOUS
Refills: 0 | Status: DISCONTINUED | OUTPATIENT
Start: 2019-05-30 | End: 2019-06-04

## 2019-05-30 RX ORDER — QUETIAPINE FUMARATE 200 MG/1
50 TABLET, FILM COATED ORAL AT BEDTIME
Refills: 0 | Status: DISCONTINUED | OUTPATIENT
Start: 2019-05-30 | End: 2019-06-04

## 2019-05-30 RX ORDER — DEXTROSE 50 % IN WATER 50 %
25 SYRINGE (ML) INTRAVENOUS ONCE
Refills: 0 | Status: DISCONTINUED | OUTPATIENT
Start: 2019-05-30 | End: 2019-06-04

## 2019-05-30 RX ORDER — MEMANTINE HYDROCHLORIDE 10 MG/1
10 TABLET ORAL
Refills: 0 | Status: DISCONTINUED | OUTPATIENT
Start: 2019-05-30 | End: 2019-06-03

## 2019-05-30 RX ORDER — QUETIAPINE FUMARATE 200 MG/1
50 TABLET, FILM COATED ORAL ONCE
Refills: 0 | Status: COMPLETED | OUTPATIENT
Start: 2019-05-30 | End: 2019-05-30

## 2019-05-30 RX ADMIN — Medication 2: at 10:38

## 2019-05-30 RX ADMIN — Medication 1: at 22:05

## 2019-05-30 RX ADMIN — AMLODIPINE BESYLATE 10 MILLIGRAM(S): 2.5 TABLET ORAL at 03:58

## 2019-05-30 RX ADMIN — MEMANTINE HYDROCHLORIDE 10 MILLIGRAM(S): 10 TABLET ORAL at 17:28

## 2019-05-30 RX ADMIN — Medication 112 MICROGRAM(S): at 13:58

## 2019-05-30 RX ADMIN — Medication 3: at 17:28

## 2019-05-30 RX ADMIN — Medication 1: at 13:58

## 2019-05-30 RX ADMIN — ATENOLOL 50 MILLIGRAM(S): 25 TABLET ORAL at 03:58

## 2019-05-30 RX ADMIN — Medication 81 MILLIGRAM(S): at 03:59

## 2019-05-30 RX ADMIN — PRAMIPEXOLE DIHYDROCHLORIDE 0.75 MILLIGRAM(S): 0.12 TABLET ORAL at 21:18

## 2019-05-30 RX ADMIN — QUETIAPINE FUMARATE 50 MILLIGRAM(S): 200 TABLET, FILM COATED ORAL at 21:17

## 2019-05-30 RX ADMIN — SIMVASTATIN 5 MILLIGRAM(S): 20 TABLET, FILM COATED ORAL at 21:17

## 2019-05-30 RX ADMIN — DULOXETINE HYDROCHLORIDE 30 MILLIGRAM(S): 30 CAPSULE, DELAYED RELEASE ORAL at 21:17

## 2019-05-30 RX ADMIN — Medication 40 MILLIGRAM(S): at 13:58

## 2019-05-30 RX ADMIN — DONEPEZIL HYDROCHLORIDE 10 MILLIGRAM(S): 10 TABLET, FILM COATED ORAL at 21:17

## 2019-05-30 RX ADMIN — QUETIAPINE FUMARATE 50 MILLIGRAM(S): 200 TABLET, FILM COATED ORAL at 03:58

## 2019-05-30 NOTE — H&P ADULT - PROBLEM SELECTOR PLAN 7
IMPROVE VTE Individual Risk Assessment, Score = 2, will give HSQ for DVT ppx           RISK                                                          Points  [  ] Previous VTE                                               3  [  ] Thrombophilia                                            2  [  ] Lower limb paresis/paralysis                    2    [  ] Active Cancer (in last 6 months)              2   [ x ] Immobilization > 24 hrs                             1  [  ] ICU/CCU stay > 24 hours                            1  [ x ] Age > 60                                                        1                                            Total Score ____2_____ Diet: Dash, consistent carbohydrate

## 2019-05-30 NOTE — H&P ADULT - NSHPLABSRESULTS_GEN_ALL_CORE
CBC Full  -  ( 29 May 2019 23:42 )  WBC Count : 10.13 K/uL  RBC Count : 3.69 M/uL  Hemoglobin : 11.5 g/dL  Hematocrit : 35.3 %  Platelet Count - Automated : 187 K/uL  Mean Cell Volume : 95.7 fL  Mean Cell Hemoglobin : 31.2 pg  Mean Cell Hemoglobin Concentration : 32.6 %  Auto Neutrophil # : 6.74 K/uL  Auto Lymphocyte # : 2.25 K/uL  Auto Monocyte # : 0.88 K/uL  Auto Eosinophil # : 0.19 K/uL  Auto Basophil # : 0.04 K/uL  Auto Neutrophil % : 66.5 %  Auto Lymphocyte % : 22.2 %  Auto Monocyte % : 8.7 %  Auto Eosinophil % : 1.9 %  Auto Basophil % : 0.4 %    05-29    145  |  102  |  34<H>  ----------------------------<  187<H>  3.6   |  25  |  1.57<H>    Ca    8.7      29 May 2019 23:42  Phos  4.1     05-29  Mg     1.8     05-29    TPro  6.8  /  Alb  4.0  /  TBili  0.2  /  DBili  x   /  AST  20  /  ALT  12  /  AlkPhos  97  05-29    Urinalysis (05.29.19 @ 23:42)    Color: LIGHT YELLOW    Urine Appearance: CLEAR    Glucose: NEGATIVE    Bilirubin: NEGATIVE    Ketone - Urine: NEGATIVE    Specific Gravity: 1.019    Blood: NEGATIVE    pH - Urine: 6.0    Protein, Urine: 100    Urobilinogen: NORMAL    Nitrite: NEGATIVE    Leukocyte Esterase Concentration: NEGATIVE    Red Blood Cell - Urine: 0-2    White Blood Cell - Urine: 0-2    Hyaline Casts: NEGATIVE    Bacteria: NEGATIVE    Squamous Epithelial: OCC    < from: CT Head No Cont (05.30.19 @ 04:28) >  IMPRESSION: No intracranial hemorrhage or mass effect.  < end of copied text >    < from: Xray Chest 1 View- PORTABLE-Urgent (05.29.19 @ 23:35)   IMPRESSION: Patchy opacities in the bilateral lower lobes which may   represent atelectasis versus infection. No pneumothorax. A 1.0 cm   radiopaque density within the right midlung field may represent nipple   shadow versus pulmonary nodule. Noncontrast chest CT would be helpful to   further assess.  < end of copied text >    < from: 12 Lead ECG (05.29.19 @ 21:31) >  Sinus  QTC Calculation(Bezet) 457 ms  < end of copied text >

## 2019-05-30 NOTE — H&P ADULT - ASSESSMENT
89M hx of T2DM, HTN, HLD, Dementia, being admitted for encephalopathy 89M hx of T2DM, HTN, HLD, Dementia, CHF (echo from July 2018 shows EF 50%, mild diastolic dysfunction) and hypothyroidism being admitted for loss of consciousness after taking an extra Klonopin dose as recommended by PCP. Family states unsafe for patient to return home as he lives by himself (wife recently passed away) and has been having increased episodes of agitation.

## 2019-05-30 NOTE — H&P ADULT - PROBLEM SELECTOR PLAN 4
Patient appears to have CKD given SCr of 1.5 or greater on previous hospitalizations, unclear baseline currently 1.57. Will continue to monitor SCr daily avoid nephrotoxins where possible.

## 2019-05-30 NOTE — H&P ADULT - PROBLEM SELECTOR PLAN 6
Diet: Dash, consistent carbohydrate Obtain SW consult.  Family concerned about patient returning home by self, asking for placement.  Wife passed away 2 weeks ago.  - Note: family says it is unclear whether patient was born [birth year] or [birth year] - patient says [birth year] however on our records and some family members believe his bday is [birth year]

## 2019-05-30 NOTE — H&P ADULT - NSHPREVIEWOFSYSTEMS_GEN_ALL_CORE
REVIEW OF SYSTEMS:    CONSTITUTIONAL: No weakness, fevers or chills  EYES/ENT: No visual changes;  No vertigo or throat pain   NECK: No pain or stiffness  RESPIRATORY: No cough, wheezing, hemoptysis; No shortness of breath  CARDIOVASCULAR: No chest pain or palpitations  GASTROINTESTINAL: No abdominal or epigastric pain. No nausea, vomiting, or hematemesis; No diarrhea or constipation. No melena or hematochezia.  GENITOURINARY: No dysuria, frequency or hematuria  NEUROLOGICAL: No numbness or weakness  SKIN: No itching, burning, rashes, or lesions   All other review of systems is negative unless indicated above. REVIEW OF SYSTEMS:    CONSTITUTIONAL: No weakness, fevers or chills  EYES/ENT: No visual changes;  No vertigo or throat pain   NECK: No pain or stiffness  RESPIRATORY: No cough, wheezing, hemoptysis; No shortness of breath  CARDIOVASCULAR: No chest pain or palpitations  GASTROINTESTINAL: No abdominal or epigastric pain. No nausea, vomiting, or hematemesis; No diarrhea.  Unsure when last bowel movement was. No melena or hematochezia.  GENITOURINARY: No dysuria, frequency or hematuria  NEUROLOGICAL: No numbness or weakness  SKIN: No itching, burning, rashes, or lesions   All other review of systems is negative unless indicated above.

## 2019-05-30 NOTE — H&P ADULT - PROBLEM SELECTOR PLAN 1
Differential diagnosis - infectious versus CVA versus metabolic Differential diagnosis - infectious versus CVA versus metabolic encephalopathy.  CT head without acute findings.  Will obtain non-contrast CT chest for possible aspiration PNA as patient found drooling and unresponsive at home.  Hold off on Antibiotics for now.  Patient without fever, elevated WBC or cough.

## 2019-05-30 NOTE — H&P ADULT - NSICDXPASTMEDICALHX_GEN_ALL_CORE_FT
PAST MEDICAL HISTORY:  Dementia     Diabetes     HTN (hypertension) PAST MEDICAL HISTORY:  CHF NYHA class I, unspecified failure chronicity, diastolic     Dementia     Diabetes     HTN (hypertension)     Hypothyroid

## 2019-05-30 NOTE — H&P ADULT - PROBLEM SELECTOR PLAN 9
Patient's med reconciliation performed. Med rec pharmacist notes some medications still need to be verified with pharmacy.

## 2019-05-30 NOTE — H&P ADULT - PROBLEM SELECTOR PLAN 5
Obtain SW consult.  Family concerned about patient returning home by self, asking for placement.  Wife passed away 2 weeks ago. Not in acute chf right now. c/w Lasix as previously prescribed

## 2019-05-30 NOTE — H&P ADULT - HISTORY OF PRESENT ILLNESS
89M hx of T2DM, HTN, HLD, Dementia, 89M hx of T2DM, HTN, HLD, Dementia, CHF (echo from July 2018 shows EF 50%, mild diastolic dysfunction) and hypothyroidism presents with loss of consciousness after taking an extra Klonopin dose as recommended by PCP.  Patient with increased agitation as per son, wife recently passed away about 2 weeks ago. Brought in by EMS, he woke up immediately with the personnel and had no falls/trauma. He denies any recent illnesses, fever, chills, chest pain, abdominal pain, diarrhea or other changes. Son endorses that there is nobody left to help take care of him and is looking for a nursing home.

## 2019-05-30 NOTE — H&P ADULT - PROBLEM SELECTOR PLAN 2
Patient on basal-bolus insulin therapy at home. Currently FS elevated. Will continue basal-bolus therapy, check FS qid and correctional scale. Will adjust throughout hospitalization

## 2019-05-30 NOTE — H&P ADULT - ATTENDING COMMENTS
Agree with NP Note Above, edits made where appropriate, case discussed with NP    Patient seen and examined. This is a 89M being admitted for nursing home placement s/p agitation and additional klonopin administration at home. Patient with later event of difficulty to arouse and observed drooling. Son initially thought patient had a CVA. Patient later woke up when EMS arrived.   VS and PE above  Labs and Imaging Reviewed   - Encephalopathy - likely medication induced. However given drooling observed by EMS with decreased consciousness, and questionable infiltrate on CXR, would check CT Chest no contrast - observe off abx for now as non-toxic appearing. c/w home psychiatric medications for now  - Dementia - SW referral for NH placement  Rest of plan above     Josef Mireles DO  Division of Hospital Medicine  Pager #: [phone]

## 2019-05-30 NOTE — H&P ADULT - PROBLEM SELECTOR PLAN 8
IMPROVE VTE Individual Risk Assessment, Score = 2, will give HSQ for DVT ppx           RISK                                                          Points  [  ] Previous VTE                                               3  [  ] Thrombophilia                                            2  [  ] Lower limb paresis/paralysis                    2    [  ] Active Cancer (in last 6 months)              2   [ x ] Immobilization > 24 hrs                             1  [  ] ICU/CCU stay > 24 hours                            1  [ x ] Age > 60                                                        1                                            Total Score ____2_____

## 2019-05-30 NOTE — H&P ADULT - NSHPPHYSICALEXAM_GEN_ALL_CORE
Vital Signs Last 24 Hrs  T(C): 36.3 (30 May 2019 10:20), Max: 36.7 (30 May 2019 03:23)  T(F): 97.3 (30 May 2019 10:20), Max: 98 (30 May 2019 03:23)  HR: 60 (30 May 2019 10:20) (59 - 66)  BP: 151/55 (30 May 2019 10:20) (150/43 - 188/58)  BP(mean): --  RR: 16 (30 May 2019 10:20) (16 - 17)  SpO2: 100% (30 May 2019 10:20) (97% - 100%)    General: NAD, non-toxic appearing, speaking in full sentences   HEENT: EOMI, PERRLA, no conjunctival pallor, MMM, no JVD, no thyromegaly, neck supple, trachea midline  CV: S1S2 RRR no MRG  Lungs: CTA BL  Abdomen: soft NTND +BS   Extremities: No CCE +WWP  Skin/MSK: No rashes, preserved ROM on active & passive movement  Neuro: AAOx3, no focal deficits (5/5 strength all extremities), no sensory deficits Vital Signs Last 24 Hrs  T(C): 36.3 (30 May 2019 10:20), Max: 36.7 (30 May 2019 03:23)  T(F): 97.3 (30 May 2019 10:20), Max: 98 (30 May 2019 03:23)  HR: 60 (30 May 2019 10:20) (59 - 66)  BP: 151/55 (30 May 2019 10:20) (150/43 - 188/58)  BP(mean): --  RR: 16 (30 May 2019 10:20) (16 - 17)  SpO2: 100% (30 May 2019 10:20) (97% - 100%)    General: NAD, non-toxic appearing, speaking in full sentences   HEENT: EOMI, PERRLA, no conjunctival pallor, MMM, no JVD, no thyromegaly, neck supple, trachea midline  CV: S1S2 RRR no MRG  Lungs: CTA BL  Abdomen: soft NTND +BS   Extremities: No CCE +WWP  Skin/MSK: No rashes, preserved ROM on active & passive movement  Neuro: AAOx2-3, no focal deficits (5/5 strength all extremities), no sensory deficits

## 2019-05-31 LAB
24R-OH-CALCIDIOL SERPL-MCNC: 29 NG/ML — LOW (ref 30–80)
ANION GAP SERPL CALC-SCNC: 13 MMO/L — SIGNIFICANT CHANGE UP (ref 7–14)
BASOPHILS # BLD AUTO: 0.04 K/UL — SIGNIFICANT CHANGE UP (ref 0–0.2)
BASOPHILS NFR BLD AUTO: 0.4 % — SIGNIFICANT CHANGE UP (ref 0–2)
BUN SERPL-MCNC: 36 MG/DL — HIGH (ref 7–23)
CALCIUM SERPL-MCNC: 9.5 MG/DL — SIGNIFICANT CHANGE UP (ref 8.4–10.5)
CHLORIDE SERPL-SCNC: 101 MMOL/L — SIGNIFICANT CHANGE UP (ref 98–107)
CO2 SERPL-SCNC: 29 MMOL/L — SIGNIFICANT CHANGE UP (ref 22–31)
CREAT SERPL-MCNC: 1.52 MG/DL — HIGH (ref 0.5–1.3)
EOSINOPHIL # BLD AUTO: 0.22 K/UL — SIGNIFICANT CHANGE UP (ref 0–0.5)
EOSINOPHIL NFR BLD AUTO: 2.3 % — SIGNIFICANT CHANGE UP (ref 0–6)
GLUCOSE BLDC GLUCOMTR-MCNC: 219 MG/DL — HIGH (ref 70–99)
GLUCOSE BLDC GLUCOMTR-MCNC: 231 MG/DL — HIGH (ref 70–99)
GLUCOSE BLDC GLUCOMTR-MCNC: 231 MG/DL — HIGH (ref 70–99)
GLUCOSE SERPL-MCNC: 224 MG/DL — HIGH (ref 70–99)
HBA1C BLD-MCNC: 8.3 % — HIGH (ref 4–5.6)
HCT VFR BLD CALC: 37 % — LOW (ref 39–50)
HGB BLD-MCNC: 11.9 G/DL — LOW (ref 13–17)
IMM GRANULOCYTES NFR BLD AUTO: 0.3 % — SIGNIFICANT CHANGE UP (ref 0–1.5)
LYMPHOCYTES # BLD AUTO: 2.3 K/UL — SIGNIFICANT CHANGE UP (ref 1–3.3)
LYMPHOCYTES # BLD AUTO: 23.6 % — SIGNIFICANT CHANGE UP (ref 13–44)
MAGNESIUM SERPL-MCNC: 1.9 MG/DL — SIGNIFICANT CHANGE UP (ref 1.6–2.6)
MCHC RBC-ENTMCNC: 31.2 PG — SIGNIFICANT CHANGE UP (ref 27–34)
MCHC RBC-ENTMCNC: 32.2 % — SIGNIFICANT CHANGE UP (ref 32–36)
MCV RBC AUTO: 96.9 FL — SIGNIFICANT CHANGE UP (ref 80–100)
MONOCYTES # BLD AUTO: 0.95 K/UL — HIGH (ref 0–0.9)
MONOCYTES NFR BLD AUTO: 9.8 % — SIGNIFICANT CHANGE UP (ref 2–14)
NEUTROPHILS # BLD AUTO: 6.19 K/UL — SIGNIFICANT CHANGE UP (ref 1.8–7.4)
NEUTROPHILS NFR BLD AUTO: 63.6 % — SIGNIFICANT CHANGE UP (ref 43–77)
NRBC # FLD: 0 K/UL — SIGNIFICANT CHANGE UP (ref 0–0)
PHOSPHATE SERPL-MCNC: 3.8 MG/DL — SIGNIFICANT CHANGE UP (ref 2.5–4.5)
PLATELET # BLD AUTO: 184 K/UL — SIGNIFICANT CHANGE UP (ref 150–400)
PMV BLD: 12.2 FL — SIGNIFICANT CHANGE UP (ref 7–13)
POTASSIUM SERPL-MCNC: 3.6 MMOL/L — SIGNIFICANT CHANGE UP (ref 3.5–5.3)
POTASSIUM SERPL-SCNC: 3.6 MMOL/L — SIGNIFICANT CHANGE UP (ref 3.5–5.3)
RBC # BLD: 3.82 M/UL — LOW (ref 4.2–5.8)
RBC # FLD: 13.9 % — SIGNIFICANT CHANGE UP (ref 10.3–14.5)
SODIUM SERPL-SCNC: 143 MMOL/L — SIGNIFICANT CHANGE UP (ref 135–145)
VIT B12 SERPL-MCNC: 451 PG/ML — SIGNIFICANT CHANGE UP (ref 200–900)
WBC # BLD: 9.73 K/UL — SIGNIFICANT CHANGE UP (ref 3.8–10.5)
WBC # FLD AUTO: 9.73 K/UL — SIGNIFICANT CHANGE UP (ref 3.8–10.5)

## 2019-05-31 PROCEDURE — 99232 SBSQ HOSP IP/OBS MODERATE 35: CPT

## 2019-05-31 RX ADMIN — MEMANTINE HYDROCHLORIDE 10 MILLIGRAM(S): 10 TABLET ORAL at 05:02

## 2019-05-31 RX ADMIN — MEMANTINE HYDROCHLORIDE 10 MILLIGRAM(S): 10 TABLET ORAL at 17:22

## 2019-05-31 RX ADMIN — SIMVASTATIN 5 MILLIGRAM(S): 20 TABLET, FILM COATED ORAL at 21:04

## 2019-05-31 RX ADMIN — Medication 2: at 08:39

## 2019-05-31 RX ADMIN — DULOXETINE HYDROCHLORIDE 30 MILLIGRAM(S): 30 CAPSULE, DELAYED RELEASE ORAL at 21:04

## 2019-05-31 RX ADMIN — Medication 81 MILLIGRAM(S): at 12:19

## 2019-05-31 RX ADMIN — Medication 2: at 17:22

## 2019-05-31 RX ADMIN — Medication 112 MICROGRAM(S): at 05:02

## 2019-05-31 RX ADMIN — ATENOLOL 50 MILLIGRAM(S): 25 TABLET ORAL at 05:02

## 2019-05-31 RX ADMIN — Medication 40 MILLIGRAM(S): at 05:02

## 2019-05-31 RX ADMIN — HEPARIN SODIUM 5000 UNIT(S): 5000 INJECTION INTRAVENOUS; SUBCUTANEOUS at 17:23

## 2019-05-31 RX ADMIN — PRAMIPEXOLE DIHYDROCHLORIDE 0.75 MILLIGRAM(S): 0.12 TABLET ORAL at 21:04

## 2019-05-31 RX ADMIN — HEPARIN SODIUM 5000 UNIT(S): 5000 INJECTION INTRAVENOUS; SUBCUTANEOUS at 05:02

## 2019-05-31 RX ADMIN — AMLODIPINE BESYLATE 10 MILLIGRAM(S): 2.5 TABLET ORAL at 05:02

## 2019-05-31 RX ADMIN — Medication 2: at 12:19

## 2019-05-31 RX ADMIN — QUETIAPINE FUMARATE 50 MILLIGRAM(S): 200 TABLET, FILM COATED ORAL at 21:04

## 2019-05-31 RX ADMIN — DONEPEZIL HYDROCHLORIDE 10 MILLIGRAM(S): 10 TABLET, FILM COATED ORAL at 21:04

## 2019-05-31 NOTE — PROGRESS NOTE ADULT - PROBLEM SELECTOR PLAN 6
Obtain SW consult.  Family concerned about patient returning home by self, asking for placement.  Wife passed away 2 weeks ago.  - Note: family says it is unclear whether patient was born [birth year] or [birth year] - patient says [birth year] however on our records and some family members believe his bday is [birth year] Will discuss with pt's PMD regarding discontinuing donezepil and memantine because likely of little effect and would like to reduce risk of polypharmacy  - will reach out to pt's PMD on Monday, Dr. Alon Schrader [phone]  - will continue memantine and donezepil for now  - if pt with behavorial disturbances while inpatient, will consider psych consult for further management

## 2019-05-31 NOTE — PHYSICAL THERAPY INITIAL EVALUATION ADULT - GENERAL OBSERVATIONS, REHAB EVAL
Consult received, chart reviewed. Patient received seated in chair, NAD, son present. Patient agreed to Evaluation from Physical Therapist.

## 2019-05-31 NOTE — PROGRESS NOTE ADULT - ASSESSMENT
89M hx of T2DM, HTN, HLD, Dementia, CHF (echo from July 2018 shows EF 50%, mild diastolic dysfunction) and hypothyroidism being admitted for loss of consciousness after taking an extra Klonopin dose as recommended by PCP. Family states unsafe for patient to return home as he lives by himself (wife recently passed away) and has been having increased episodes of agitation. 89M hx of T2DM, HTN, HLD, Dementia, CHF (echo from July 2018 shows EF 50%, mild diastolic dysfunction) and hypothyroidism being admitted for acute toxic encephalopathy due to taking an extra Klonopin dose as recommended by PCP. Pt currently back at baseline mentation. However, family states unsafe for patient to return home as he lives by himself (wife recently passed away) and has been having increased episodes of agitation.

## 2019-05-31 NOTE — PHYSICAL THERAPY INITIAL EVALUATION ADULT - CRITERIA FOR SKILLED THERAPEUTIC INTERVENTIONS
anticipated discharge recommendation/impairments found/therapy frequency/predicted duration of therapy intervention/risk reduction/prevention/rehab potential

## 2019-05-31 NOTE — PHYSICAL THERAPY INITIAL EVALUATION ADULT - ADDITIONAL COMMENTS
Pt. owns DME of straight cane, rolling walker. Pt. has HHA for 7 hours/day.     Pt. was left seated in chair, NAD, son present. HAMILTON Armendariz made aware of pt. status and participation in PT.

## 2019-05-31 NOTE — PROGRESS NOTE ADULT - PROBLEM SELECTOR PLAN 1
Suspect medication induced from benzo use, causing toxic encephalopathy. Low concern for infectious process at this time despite CT findings since pt afebrile and without leukocytosis or pulmonary symptoms. CVA or acute cerebral pathology r/o by negative CT head. Suspect medication induced from benzo use, causing toxic encephalopathy. Low concern for infectious process at this time despite CT findings (which pt's son states is chronic) since pt afebrile and without leukocytosis or pulmonary symptoms. CVA or acute cerebral pathology r/o by negative CT head.  - avoid benzos and other sedating meds

## 2019-05-31 NOTE — PHYSICAL THERAPY INITIAL EVALUATION ADULT - PERTINENT HX OF CURRENT PROBLEM, REHAB EVAL
Pt. admitted for loss of consciousness. Encephalopathy. Per documentation, CVA or acute cerebral pathology r/o by negative CT head. PMH of T2DM, HTN, HLD, Dementia, CHF, and hypothyroidism

## 2019-05-31 NOTE — PROGRESS NOTE ADULT - PROBLEM SELECTOR PLAN 9
Patient's med reconciliation performed. Med rec pharmacist notes some medications still need to be verified with pharmacy. Patient's med reconciliation performed. Med rec pharmacist notes some medications still need to be verified with pharmacy.  - will contact pt's PMD, Dr. Alon Schrader [phone] for med list.    DISPO: Patient evaluated by PT and deemed candidate for subacute rehab. Will f/u with SW. Pt is currently medically stable with no active issues.

## 2019-05-31 NOTE — PROGRESS NOTE ADULT - SUBJECTIVE AND OBJECTIVE BOX
Patient is a 89y old  Male who presents with a chief complaint of Loss of Consciousness (30 May 2019 11:19)    SUBJECTIVE / OVERNIGHT EVENTS:      MEDICATIONS  (STANDING):  amLODIPine   Tablet 10 milliGRAM(s) Oral daily  aspirin  chewable 81 milliGRAM(s) Oral daily  ATENolol  Tablet 50 milliGRAM(s) Oral daily  dextrose 5%. 1000 milliLiter(s) (50 mL/Hr) IV Continuous <Continuous>  dextrose 50% Injectable 12.5 Gram(s) IV Push once  dextrose 50% Injectable 25 Gram(s) IV Push once  dextrose 50% Injectable 25 Gram(s) IV Push once  donepezil 10 milliGRAM(s) Oral at bedtime  DULoxetine 30 milliGRAM(s) Oral at bedtime  furosemide    Tablet 40 milliGRAM(s) Oral daily  heparin  Injectable 5000 Unit(s) SubCutaneous every 12 hours  insulin lispro (HumaLOG) corrective regimen sliding scale   SubCutaneous three times a day before meals  insulin lispro (HumaLOG) corrective regimen sliding scale   SubCutaneous at bedtime  levothyroxine 112 MICROGram(s) Oral daily  memantine 10 milliGRAM(s) Oral two times a day  pramipexole 0.75 milliGRAM(s) Oral at bedtime  QUEtiapine 50 milliGRAM(s) Oral at bedtime  simvastatin 5 milliGRAM(s) Oral at bedtime    MEDICATIONS  (PRN):  dextrose 40% Gel 15 Gram(s) Oral once PRN Blood Glucose LESS THAN 70 milliGRAM(s)/deciLiter  glucagon  Injectable 1 milliGRAM(s) IntraMuscular once PRN Glucose <70 milliGRAM(s)/deciLiter      Vital Signs Last 24 Hrs  T(C): 36.4 (31 May 2019 04:59), Max: 36.7 (30 May 2019 21:07)  T(F): 97.5 (31 May 2019 04:59), Max: 98 (30 May 2019 21:07)  HR: 60 (31 May 2019 04:59) (57 - 100)  BP: 166/63 (31 May 2019 04:59) (149/55 - 176/78)  BP(mean): --  RR: 19 (31 May 2019 04:59) (16 - 19)  SpO2: 98% (31 May 2019 04:59) (98% - 100%)      CAPILLARY BLOOD GLUCOSE  POCT Blood Glucose.: 245 mg/dL (31 May 2019 08:25)  POCT Blood Glucose.: 287 mg/dL (30 May 2019 21:42)  POCT Blood Glucose.: 287 mg/dL (30 May 2019 17:11)  POCT Blood Glucose.: 162 mg/dL (30 May 2019 13:08)        PHYSICAL EXAM  GENERAL: NAD, well-developed  HEAD:  Atraumatic, Normocephalic  EYES: EOMI, PERRLA, conjunctiva and sclera clear  NECK: Supple, No JVD  CHEST/LUNG: Clear to auscultation bilaterally; No wheeze  HEART: Regular rate and rhythm; No murmurs, rubs, or gallops  ABDOMEN: Soft, Nontender, Nondistended; Bowel sounds present  EXTREMITIES:  2+ Peripheral Pulses, No clubbing, cyanosis, or edema  PSYCH: AAOx3  SKIN: No rashes or lesions    LABS:                        11.9   9.73  )-----------( 184      ( 31 May 2019 06:26 )             37.0     -    143  |  101  |  36<H>  ----------------------------<  224<H>  3.6   |  29  |  1.52<H>    Ca    9.5      31 May 2019 06:26  Phos  3.8       Mg     1.9         TPro  6.8  /  Alb  4.0  /  TBili  0.2  /  DBili  x   /  AST  20  /  ALT  12  /  AlkPhos  97            Urinalysis Basic - ( 29 May 2019 23:42 )    Color: LIGHT YELLOW / Appearance: CLEAR / S.019 / pH: 6.0  Gluc: NEGATIVE / Ketone: NEGATIVE  / Bili: NEGATIVE / Urobili: NORMAL   Blood: NEGATIVE / Protein: 100 / Nitrite: NEGATIVE   Leuk Esterase: NEGATIVE / RBC: 0-2 / WBC 0-2   Sq Epi: OCC / Non Sq Epi: x / Bacteria: NEGATIVE        RADIOLOGY & ADDITIONAL TESTS:    Imaging Personally Reviewed:  Consultant(s) Notes Reviewed:    Care Discussed with Consultants/Other Providers: Patient is a 89y old  Male who presents with a chief complaint of Loss of Consciousness (30 May 2019 11:19)    SUBJECTIVE / OVERNIGHT EVENTS:  Patient seen and examined. Patient son, Deuce, present at bedside. Patient has no complaints and his son reports patient appears at baseline mentation and health. Patient suffers from sundowning usually and can become agitated when he does not get his way. Otherwise, patient is mainly cooperative. He does have hx of gait instability and falls. Patient's wife recently  2 weeks ago, patient reports he is coping okay.     MEDICATIONS  (STANDING):  amLODIPine   Tablet 10 milliGRAM(s) Oral daily  aspirin  chewable 81 milliGRAM(s) Oral daily  ATENolol  Tablet 50 milliGRAM(s) Oral daily  dextrose 5%. 1000 milliLiter(s) (50 mL/Hr) IV Continuous <Continuous>  dextrose 50% Injectable 12.5 Gram(s) IV Push once  dextrose 50% Injectable 25 Gram(s) IV Push once  dextrose 50% Injectable 25 Gram(s) IV Push once  donepezil 10 milliGRAM(s) Oral at bedtime  DULoxetine 30 milliGRAM(s) Oral at bedtime  furosemide    Tablet 40 milliGRAM(s) Oral daily  heparin  Injectable 5000 Unit(s) SubCutaneous every 12 hours  insulin lispro (HumaLOG) corrective regimen sliding scale   SubCutaneous three times a day before meals  insulin lispro (HumaLOG) corrective regimen sliding scale   SubCutaneous at bedtime  levothyroxine 112 MICROGram(s) Oral daily  memantine 10 milliGRAM(s) Oral two times a day  pramipexole 0.75 milliGRAM(s) Oral at bedtime  QUEtiapine 50 milliGRAM(s) Oral at bedtime  simvastatin 5 milliGRAM(s) Oral at bedtime    MEDICATIONS  (PRN):  dextrose 40% Gel 15 Gram(s) Oral once PRN Blood Glucose LESS THAN 70 milliGRAM(s)/deciLiter  glucagon  Injectable 1 milliGRAM(s) IntraMuscular once PRN Glucose <70 milliGRAM(s)/deciLiter      Vital Signs Last 24 Hrs  T(C): 36.4 (31 May 2019 04:59), Max: 36.7 (30 May 2019 21:07)  T(F): 97.5 (31 May 2019 04:59), Max: 98 (30 May 2019 21:07)  HR: 60 (31 May 2019 04:59) (57 - 100)  BP: 166/63 (31 May 2019 04:59) (149/55 - 176/78)  BP(mean): --  RR: 19 (31 May 2019 04:59) (16 - 19)  SpO2: 98% (31 May 2019 04:59) (98% - 100%)      CAPILLARY BLOOD GLUCOSE  POCT Blood Glucose.: 245 mg/dL (31 May 2019 08:25)  POCT Blood Glucose.: 287 mg/dL (30 May 2019 21:42)  POCT Blood Glucose.: 287 mg/dL (30 May 2019 17:11)  POCT Blood Glucose.: 162 mg/dL (30 May 2019 13:08)        PHYSICAL EXAM  GENERAL: NAD, well appearing  CHEST/LUNG: Clear to auscultation bilaterally; No wheeze  HEART: Regular rate and rhythm; No murmurs, rubs, or gallops  ABDOMEN: Soft, Nontender, Nondistended; Bowel sounds present  EXTREMITIES:  2+ Peripheral Pulses, No clubbing, cyanosis, or edema  PSYCH: Alert, conversant, cooperative, follows simple commands, recognizes familiar faces and items      LABS:                        11.9   9.73  )-----------( 184      ( 31 May 2019 06:26 )             37.0     05-31    143  |  101  |  36<H>  ----------------------------<  224<H>  3.6   |  29  |  1.52<H>    Ca    9.5      31 May 2019 06:26  Phos  3.8     05-31  Mg     1.9     05-31      RADIOLOGY & ADDITIONAL TESTS:      EXAM:  CT CHEST        PROCEDURE DATE:  May 30 2019         INTERPRETATION:  CLINICAL INFORMATION: Unresponsive.    COMPARISON: Chest radiograph 2019.    PROCEDURE:   CT of the Chest was performed without intravenous contrast.  Sagittal and coronal reformats were performed.      FINDINGS:    LUNGS AND AIRWAYS: Patent central airways. Small clusters of nodules   noted throughout both lungs particularly in the upper lobes some of which   are tree-in-bud configuration. Additional areas of mucoid impacted   bronchi particularly within the right lower and right middle lobes.   Findings may reflect infectious bronchiolitis or aspiration.    PLEURA: No pleural effusion.    MEDIASTINUM AND KOFI: No lymphadenopathy. Subcentimeter mediastinal lymph   nodes.    VESSELS: Aortic calcifications.    HEART: Heart size is normal. No pericardial effusion. Coronary artery   calcifications.    CHEST WALL AND LOWER NECK: Within normal limits.    VISUALIZED UPPER ABDOMEN: Large right renal cyst with internal septations   and calcifications. Left renal cyst. No hydronephrosis. The other   visualized portions of the upper abdomen are within normal limits.    BONES: Chronic healed right rib fractures. Mild degenerative changes.    IMPRESSION:     Small clusters of nodules noted throughout both lungs particularly in the   upper lobes some of which are tree-in-bud configuration. Additional areas   of mucoid impacted bronchi particularly within the right lower and right   middle lobes. Findings may reflect infectious bronchiolitis or aspiration.      Imaging Personally Reviewed:  Consultant(s) Notes Reviewed:    Care Discussed with Consultants/Other Providers:

## 2019-06-01 LAB
ANION GAP SERPL CALC-SCNC: 13 MMO/L — SIGNIFICANT CHANGE UP (ref 7–14)
BASOPHILS # BLD AUTO: 0.02 K/UL — SIGNIFICANT CHANGE UP (ref 0–0.2)
BASOPHILS NFR BLD AUTO: 0.2 % — SIGNIFICANT CHANGE UP (ref 0–2)
BUN SERPL-MCNC: 36 MG/DL — HIGH (ref 7–23)
CALCIUM SERPL-MCNC: 8.3 MG/DL — LOW (ref 8.4–10.5)
CHLORIDE SERPL-SCNC: 100 MMOL/L — SIGNIFICANT CHANGE UP (ref 98–107)
CO2 SERPL-SCNC: 26 MMOL/L — SIGNIFICANT CHANGE UP (ref 22–31)
CREAT SERPL-MCNC: 1.47 MG/DL — HIGH (ref 0.5–1.3)
EOSINOPHIL # BLD AUTO: 0.17 K/UL — SIGNIFICANT CHANGE UP (ref 0–0.5)
EOSINOPHIL NFR BLD AUTO: 2 % — SIGNIFICANT CHANGE UP (ref 0–6)
GLUCOSE BLDC GLUCOMTR-MCNC: 199 MG/DL — HIGH (ref 70–99)
GLUCOSE BLDC GLUCOMTR-MCNC: 230 MG/DL — HIGH (ref 70–99)
GLUCOSE BLDC GLUCOMTR-MCNC: 232 MG/DL — HIGH (ref 70–99)
GLUCOSE BLDC GLUCOMTR-MCNC: 286 MG/DL — HIGH (ref 70–99)
GLUCOSE SERPL-MCNC: 224 MG/DL — HIGH (ref 70–99)
HCT VFR BLD CALC: 33.5 % — LOW (ref 39–50)
HGB BLD-MCNC: 11 G/DL — LOW (ref 13–17)
IMM GRANULOCYTES NFR BLD AUTO: 0.4 % — SIGNIFICANT CHANGE UP (ref 0–1.5)
LYMPHOCYTES # BLD AUTO: 1.66 K/UL — SIGNIFICANT CHANGE UP (ref 1–3.3)
LYMPHOCYTES # BLD AUTO: 19.7 % — SIGNIFICANT CHANGE UP (ref 13–44)
MAGNESIUM SERPL-MCNC: 1.9 MG/DL — SIGNIFICANT CHANGE UP (ref 1.6–2.6)
MCHC RBC-ENTMCNC: 31.3 PG — SIGNIFICANT CHANGE UP (ref 27–34)
MCHC RBC-ENTMCNC: 32.8 % — SIGNIFICANT CHANGE UP (ref 32–36)
MCV RBC AUTO: 95.2 FL — SIGNIFICANT CHANGE UP (ref 80–100)
MONOCYTES # BLD AUTO: 0.74 K/UL — SIGNIFICANT CHANGE UP (ref 0–0.9)
MONOCYTES NFR BLD AUTO: 8.8 % — SIGNIFICANT CHANGE UP (ref 2–14)
NEUTROPHILS # BLD AUTO: 5.81 K/UL — SIGNIFICANT CHANGE UP (ref 1.8–7.4)
NEUTROPHILS NFR BLD AUTO: 68.9 % — SIGNIFICANT CHANGE UP (ref 43–77)
NRBC # FLD: 0 K/UL — SIGNIFICANT CHANGE UP (ref 0–0)
PHOSPHATE SERPL-MCNC: 3.8 MG/DL — SIGNIFICANT CHANGE UP (ref 2.5–4.5)
PLATELET # BLD AUTO: 165 K/UL — SIGNIFICANT CHANGE UP (ref 150–400)
PMV BLD: 11.9 FL — SIGNIFICANT CHANGE UP (ref 7–13)
POTASSIUM SERPL-MCNC: 3.3 MMOL/L — LOW (ref 3.5–5.3)
POTASSIUM SERPL-SCNC: 3.3 MMOL/L — LOW (ref 3.5–5.3)
RBC # BLD: 3.52 M/UL — LOW (ref 4.2–5.8)
RBC # FLD: 13.7 % — SIGNIFICANT CHANGE UP (ref 10.3–14.5)
SODIUM SERPL-SCNC: 139 MMOL/L — SIGNIFICANT CHANGE UP (ref 135–145)
WBC # BLD: 8.43 K/UL — SIGNIFICANT CHANGE UP (ref 3.8–10.5)
WBC # FLD AUTO: 8.43 K/UL — SIGNIFICANT CHANGE UP (ref 3.8–10.5)

## 2019-06-01 PROCEDURE — 99232 SBSQ HOSP IP/OBS MODERATE 35: CPT

## 2019-06-01 RX ORDER — INSULIN GLARGINE 100 [IU]/ML
10 INJECTION, SOLUTION SUBCUTANEOUS AT BEDTIME
Refills: 0 | Status: DISCONTINUED | OUTPATIENT
Start: 2019-06-01 | End: 2019-06-03

## 2019-06-01 RX ORDER — POTASSIUM CHLORIDE 20 MEQ
40 PACKET (EA) ORAL ONCE
Refills: 0 | Status: COMPLETED | OUTPATIENT
Start: 2019-06-01 | End: 2019-06-01

## 2019-06-01 RX ADMIN — SIMVASTATIN 5 MILLIGRAM(S): 20 TABLET, FILM COATED ORAL at 21:28

## 2019-06-01 RX ADMIN — HEPARIN SODIUM 5000 UNIT(S): 5000 INJECTION INTRAVENOUS; SUBCUTANEOUS at 17:37

## 2019-06-01 RX ADMIN — PRAMIPEXOLE DIHYDROCHLORIDE 0.75 MILLIGRAM(S): 0.12 TABLET ORAL at 21:28

## 2019-06-01 RX ADMIN — Medication 81 MILLIGRAM(S): at 12:23

## 2019-06-01 RX ADMIN — ATENOLOL 50 MILLIGRAM(S): 25 TABLET ORAL at 05:52

## 2019-06-01 RX ADMIN — DULOXETINE HYDROCHLORIDE 30 MILLIGRAM(S): 30 CAPSULE, DELAYED RELEASE ORAL at 21:28

## 2019-06-01 RX ADMIN — QUETIAPINE FUMARATE 50 MILLIGRAM(S): 200 TABLET, FILM COATED ORAL at 21:27

## 2019-06-01 RX ADMIN — Medication 40 MILLIEQUIVALENT(S): at 17:36

## 2019-06-01 RX ADMIN — INSULIN GLARGINE 10 UNIT(S): 100 INJECTION, SOLUTION SUBCUTANEOUS at 22:23

## 2019-06-01 RX ADMIN — MEMANTINE HYDROCHLORIDE 10 MILLIGRAM(S): 10 TABLET ORAL at 17:37

## 2019-06-01 RX ADMIN — Medication 40 MILLIGRAM(S): at 05:53

## 2019-06-01 RX ADMIN — Medication 3: at 12:23

## 2019-06-01 RX ADMIN — Medication 1: at 17:36

## 2019-06-01 RX ADMIN — MEMANTINE HYDROCHLORIDE 10 MILLIGRAM(S): 10 TABLET ORAL at 05:53

## 2019-06-01 RX ADMIN — HEPARIN SODIUM 5000 UNIT(S): 5000 INJECTION INTRAVENOUS; SUBCUTANEOUS at 05:52

## 2019-06-01 RX ADMIN — Medication 112 MICROGRAM(S): at 05:52

## 2019-06-01 RX ADMIN — DONEPEZIL HYDROCHLORIDE 10 MILLIGRAM(S): 10 TABLET, FILM COATED ORAL at 21:28

## 2019-06-01 RX ADMIN — AMLODIPINE BESYLATE 10 MILLIGRAM(S): 2.5 TABLET ORAL at 05:52

## 2019-06-01 RX ADMIN — Medication 2: at 08:39

## 2019-06-01 NOTE — PROGRESS NOTE ADULT - PROBLEM SELECTOR PLAN 6
Will discuss with pt's PMD regarding discontinuing donezepil and memantine because likely of little effect and would like to reduce risk of polypharmacy  - will reach out to pt's PMD on Monday, Dr. Alon Schrader [phone]  - will continue memantine and donezepil for now  - if pt with behavorial disturbances while inpatient, will consider psych consult for further management

## 2019-06-01 NOTE — PROGRESS NOTE ADULT - PROBLEM SELECTOR PLAN 4
Patient appears to have CKD given SCr of 1.5 or greater on previous hospitalizations, unclear baseline, but appears stable.  - avoid nephrotoxins where possible.

## 2019-06-01 NOTE — PROGRESS NOTE ADULT - PROBLEM SELECTOR PLAN 2
Patient on basal-bolus insulin therapy at home --> Toujeo 10 units at bedtime and Humalog 5 units at breakfast and 7 units at lunchtime. Currently FS elevated.   - start Lantus 10 units at bedtime  - check FS qid and correctional scale. Will adjust throughout hospitalization

## 2019-06-01 NOTE — PROGRESS NOTE ADULT - PROBLEM SELECTOR PLAN 9
Patient's med reconciliation performed. Med rec pharmacist notes some medications still need to be verified with pharmacy.  - will contact pt's PMD, Dr. Alon Schrader [phone] for med list.    DISPO: Patient evaluated by PT and deemed candidate for subacute rehab. Will f/u with SW. Pt is currently medically stable with no active issues. Anticipate d/c to rehab on Monday, 6/3

## 2019-06-01 NOTE — PROGRESS NOTE ADULT - ASSESSMENT
89M hx of T2DM, HTN, HLD, Dementia, CHF (echo from July 2018 shows EF 50%, mild diastolic dysfunction) and hypothyroidism admitted for acute toxic encephalopathy due to taking an extra Klonopin dose as recommended by PCP. Pt currently back at baseline mentation. However, family states unsafe for patient to return home as he lives by himself (wife recently passed away) and has been having increased episodes of agitation. Patient now awaiting disposition to subacute rehab facility.

## 2019-06-01 NOTE — PROGRESS NOTE ADULT - PROBLEM SELECTOR PLAN 1
Suspect medication induced from benzo use, causing toxic encephalopathy. Low concern for infectious process at this time despite CT findings (which pt's son states is chronic) since pt afebrile and without leukocytosis or pulmonary symptoms. CVA or acute cerebral pathology r/o by negative CT head.  - avoid benzos and other sedating meds

## 2019-06-01 NOTE — PROGRESS NOTE ADULT - SUBJECTIVE AND OBJECTIVE BOX
Patient is a 89y old  Male who presents with a chief complaint of Loss of Consciousness (31 May 2019 11:18)    SUBJECTIVE / OVERNIGHT EVENTS:  No acute events overnight. Patient has no complaints. No chest pain, SOB, n/v or abdominal pain. Patient reading newspaper in bed.    MEDICATIONS  (STANDING):  amLODIPine   Tablet 10 milliGRAM(s) Oral daily  aspirin  chewable 81 milliGRAM(s) Oral daily  ATENolol  Tablet 50 milliGRAM(s) Oral daily  dextrose 5%. 1000 milliLiter(s) (50 mL/Hr) IV Continuous <Continuous>  dextrose 50% Injectable 12.5 Gram(s) IV Push once  dextrose 50% Injectable 25 Gram(s) IV Push once  dextrose 50% Injectable 25 Gram(s) IV Push once  donepezil 10 milliGRAM(s) Oral at bedtime  DULoxetine 30 milliGRAM(s) Oral at bedtime  furosemide    Tablet 40 milliGRAM(s) Oral daily  heparin  Injectable 5000 Unit(s) SubCutaneous every 12 hours  insulin lispro (HumaLOG) corrective regimen sliding scale   SubCutaneous three times a day before meals  insulin lispro (HumaLOG) corrective regimen sliding scale   SubCutaneous at bedtime  levothyroxine 112 MICROGram(s) Oral daily  memantine 10 milliGRAM(s) Oral two times a day  pramipexole 0.75 milliGRAM(s) Oral at bedtime  QUEtiapine 50 milliGRAM(s) Oral at bedtime  simvastatin 5 milliGRAM(s) Oral at bedtime    MEDICATIONS  (PRN):  dextrose 40% Gel 15 Gram(s) Oral once PRN Blood Glucose LESS THAN 70 milliGRAM(s)/deciLiter  glucagon  Injectable 1 milliGRAM(s) IntraMuscular once PRN Glucose <70 milliGRAM(s)/deciLiter      Vital Signs Last 24 Hrs  T(C): 36.6 (01 Jun 2019 12:22), Max: 36.9 (31 May 2019 21:00)  T(F): 97.8 (01 Jun 2019 12:22), Max: 98.5 (31 May 2019 21:00)  HR: 52 (01 Jun 2019 12:22) (52 - 58)  BP: 138/47 (01 Jun 2019 12:22) (137/57 - 150/50)  BP(mean): --  RR: 16 (01 Jun 2019 12:22) (16 - 18)  SpO2: 100% (01 Jun 2019 12:22) (98% - 100%)  CAPILLARY BLOOD GLUCOSE      POCT Blood Glucose.: 199 mg/dL (01 Jun 2019 17:00)  POCT Blood Glucose.: 286 mg/dL (01 Jun 2019 11:58)  POCT Blood Glucose.: 232 mg/dL (01 Jun 2019 08:23)  POCT Blood Glucose.: 231 mg/dL (31 May 2019 21:27)          PHYSICAL EXAM  GENERAL: NAD, well appearing  CHEST/LUNG: Clear to auscultation bilaterally; No wheeze  HEART: Regular rate and rhythm; No murmurs, rubs, or gallops  ABDOMEN: Soft, Nontender, Nondistended; Bowel sounds present  EXTREMITIES:  2+ Peripheral Pulses, No clubbing, cyanosis, or edema  PSYCH: Alert, conversant, cooperative, follows simple commands, recognizes familiar faces and items      LABS:                        11.0   8.43  )-----------( 165      ( 01 Jun 2019 07:39 )             33.5     06-01    139  |  100  |  36<H>  ----------------------------<  224<H>  3.3<L>   |  26  |  1.47<H>    Ca    8.3<L>      01 Jun 2019 07:39  Phos  3.8     06-01  Mg     1.9     06-01

## 2019-06-02 LAB
GLUCOSE BLDC GLUCOMTR-MCNC: 109 MG/DL — HIGH (ref 70–99)
GLUCOSE BLDC GLUCOMTR-MCNC: 197 MG/DL — HIGH (ref 70–99)
GLUCOSE BLDC GLUCOMTR-MCNC: 235 MG/DL — HIGH (ref 70–99)
GLUCOSE BLDC GLUCOMTR-MCNC: 237 MG/DL — HIGH (ref 70–99)

## 2019-06-02 PROCEDURE — 99232 SBSQ HOSP IP/OBS MODERATE 35: CPT

## 2019-06-02 RX ORDER — HYDRALAZINE HCL 50 MG
25 TABLET ORAL ONCE
Refills: 0 | Status: COMPLETED | OUTPATIENT
Start: 2019-06-02 | End: 2019-06-02

## 2019-06-02 RX ADMIN — ATENOLOL 50 MILLIGRAM(S): 25 TABLET ORAL at 05:24

## 2019-06-02 RX ADMIN — Medication 25 MILLIGRAM(S): at 22:11

## 2019-06-02 RX ADMIN — Medication 81 MILLIGRAM(S): at 12:28

## 2019-06-02 RX ADMIN — Medication 40 MILLIGRAM(S): at 05:24

## 2019-06-02 RX ADMIN — QUETIAPINE FUMARATE 50 MILLIGRAM(S): 200 TABLET, FILM COATED ORAL at 21:45

## 2019-06-02 RX ADMIN — DONEPEZIL HYDROCHLORIDE 10 MILLIGRAM(S): 10 TABLET, FILM COATED ORAL at 21:45

## 2019-06-02 RX ADMIN — AMLODIPINE BESYLATE 10 MILLIGRAM(S): 2.5 TABLET ORAL at 05:24

## 2019-06-02 RX ADMIN — Medication 112 MICROGRAM(S): at 05:24

## 2019-06-02 RX ADMIN — INSULIN GLARGINE 10 UNIT(S): 100 INJECTION, SOLUTION SUBCUTANEOUS at 22:34

## 2019-06-02 RX ADMIN — Medication 1: at 12:28

## 2019-06-02 RX ADMIN — SIMVASTATIN 5 MILLIGRAM(S): 20 TABLET, FILM COATED ORAL at 21:45

## 2019-06-02 RX ADMIN — DULOXETINE HYDROCHLORIDE 30 MILLIGRAM(S): 30 CAPSULE, DELAYED RELEASE ORAL at 21:45

## 2019-06-02 RX ADMIN — MEMANTINE HYDROCHLORIDE 10 MILLIGRAM(S): 10 TABLET ORAL at 05:24

## 2019-06-02 RX ADMIN — HEPARIN SODIUM 5000 UNIT(S): 5000 INJECTION INTRAVENOUS; SUBCUTANEOUS at 17:24

## 2019-06-02 RX ADMIN — PRAMIPEXOLE DIHYDROCHLORIDE 0.75 MILLIGRAM(S): 0.12 TABLET ORAL at 21:45

## 2019-06-02 RX ADMIN — Medication 2: at 17:23

## 2019-06-02 RX ADMIN — HEPARIN SODIUM 5000 UNIT(S): 5000 INJECTION INTRAVENOUS; SUBCUTANEOUS at 05:23

## 2019-06-02 RX ADMIN — MEMANTINE HYDROCHLORIDE 10 MILLIGRAM(S): 10 TABLET ORAL at 17:24

## 2019-06-02 NOTE — PROGRESS NOTE ADULT - PROBLEM SELECTOR PLAN 6
Will discuss with pt's PMD regarding discontinuing donezepil and memantine because likely of little effect and would like to reduce risk of polypharmacy  - will reach out to pt's PMD on Monday, Dr. Alon Schrader [phone]  - will continue memantine and donezepil for now  - if pt with behavorial disturbances while inpatient, will consider psych consult for further management

## 2019-06-02 NOTE — PROGRESS NOTE ADULT - SUBJECTIVE AND OBJECTIVE BOX
Patient is a 89y old  Male who presents with a chief complaint of Medication-induced encephalopathy (01 Jun 2019 17:55)      SUBJECTIVE / OVERNIGHT EVENTS: patient seen and examined by bedside, no acute complains, denies headache, dizziness, SOB, CP, Palpitations , N/V/D, abdominal pain        MEDICATIONS  (STANDING):  amLODIPine   Tablet 10 milliGRAM(s) Oral daily  aspirin  chewable 81 milliGRAM(s) Oral daily  ATENolol  Tablet 50 milliGRAM(s) Oral daily  dextrose 5%. 1000 milliLiter(s) (50 mL/Hr) IV Continuous <Continuous>  dextrose 50% Injectable 12.5 Gram(s) IV Push once  dextrose 50% Injectable 25 Gram(s) IV Push once  dextrose 50% Injectable 25 Gram(s) IV Push once  donepezil 10 milliGRAM(s) Oral at bedtime  DULoxetine 30 milliGRAM(s) Oral at bedtime  furosemide    Tablet 40 milliGRAM(s) Oral daily  heparin  Injectable 5000 Unit(s) SubCutaneous every 12 hours  insulin glargine Injectable (LANTUS) 10 Unit(s) SubCutaneous at bedtime  insulin lispro (HumaLOG) corrective regimen sliding scale   SubCutaneous three times a day before meals  insulin lispro (HumaLOG) corrective regimen sliding scale   SubCutaneous at bedtime  levothyroxine 112 MICROGram(s) Oral daily  memantine 10 milliGRAM(s) Oral two times a day  pramipexole 0.75 milliGRAM(s) Oral at bedtime  QUEtiapine 50 milliGRAM(s) Oral at bedtime  simvastatin 5 milliGRAM(s) Oral at bedtime    MEDICATIONS  (PRN):  dextrose 40% Gel 15 Gram(s) Oral once PRN Blood Glucose LESS THAN 70 milliGRAM(s)/deciLiter  glucagon  Injectable 1 milliGRAM(s) IntraMuscular once PRN Glucose <70 milliGRAM(s)/deciLiter      Vital Signs Last 24 Hrs  T(C): 36.2 (02 Jun 2019 12:32), Max: 36.4 (01 Jun 2019 21:23)  T(F): 97.1 (02 Jun 2019 12:32), Max: 97.5 (01 Jun 2019 21:23)  HR: 53 (02 Jun 2019 12:32) (53 - 61)  BP: 142/68 (02 Jun 2019 12:32) (142/68 - 162/56)  BP(mean): --  RR: 17 (02 Jun 2019 12:32) (17 - 18)  SpO2: 100% (02 Jun 2019 12:32) (98% - 100%)  CAPILLARY BLOOD GLUCOSE      POCT Blood Glucose.: 235 mg/dL (02 Jun 2019 17:09)  POCT Blood Glucose.: 197 mg/dL (02 Jun 2019 12:19)  POCT Blood Glucose.: 109 mg/dL (02 Jun 2019 08:44)  POCT Blood Glucose.: 230 mg/dL (01 Jun 2019 22:12)    I&O's Summary    PHYSICAL EXAM  GENERAL: NAD, well appearing  CHEST/LUNG: Clear to auscultation bilaterally; No wheeze  HEART: Regular rate and rhythm; No murmurs, rubs, or gallops  ABDOMEN: Soft, Nontender, Nondistended; Bowel sounds present  EXTREMITIES:  2+ Peripheral Pulses, No clubbing, cyanosis, or edema  PSYCH: Alert, conversant, cooperative, follows simple commands, recognizes familiar faces and items      LABS:                        11.0   8.43  )-----------( 165      ( 01 Jun 2019 07:39 )             33.5     06-01    139  |  100  |  36<H>  ----------------------------<  224<H>  3.3<L>   |  26  |  1.47<H>    Ca    8.3<L>      01 Jun 2019 07:39  Phos  3.8     06-01  Mg     1.9     06-01                RADIOLOGY & ADDITIONAL TESTS:    Imaging Personally Reviewed:    Consultant(s) Notes Reviewed:      Care Discussed with Consultants/Other Providers:

## 2019-06-02 NOTE — PROGRESS NOTE ADULT - PROBLEM SELECTOR PLAN 9
Patient's med reconciliation performed. Med rec pharmacist notes some medications still need to be verified with pharmacy.  - will contact pt's PMD, Dr. Alon Schrader [phone] for med list.    DISPO: Patient evaluated by PT and deemed candidate for subacute rehab. Will f/u with SW. Pt is currently medically stable with no active issues. Anticipate d/c to rehab on Monday, 6/3

## 2019-06-03 LAB
ANION GAP SERPL CALC-SCNC: 15 MMO/L — HIGH (ref 7–14)
BASOPHILS # BLD AUTO: 0.03 K/UL — SIGNIFICANT CHANGE UP (ref 0–0.2)
BASOPHILS NFR BLD AUTO: 0.3 % — SIGNIFICANT CHANGE UP (ref 0–2)
BUN SERPL-MCNC: 36 MG/DL — HIGH (ref 7–23)
CALCIUM SERPL-MCNC: 9 MG/DL — SIGNIFICANT CHANGE UP (ref 8.4–10.5)
CHLORIDE SERPL-SCNC: 99 MMOL/L — SIGNIFICANT CHANGE UP (ref 98–107)
CO2 SERPL-SCNC: 25 MMOL/L — SIGNIFICANT CHANGE UP (ref 22–31)
CREAT SERPL-MCNC: 1.41 MG/DL — HIGH (ref 0.5–1.3)
EOSINOPHIL # BLD AUTO: 0.15 K/UL — SIGNIFICANT CHANGE UP (ref 0–0.5)
EOSINOPHIL NFR BLD AUTO: 1.4 % — SIGNIFICANT CHANGE UP (ref 0–6)
GLUCOSE BLDC GLUCOMTR-MCNC: 160 MG/DL — HIGH (ref 70–99)
GLUCOSE BLDC GLUCOMTR-MCNC: 165 MG/DL — HIGH (ref 70–99)
GLUCOSE BLDC GLUCOMTR-MCNC: 191 MG/DL — HIGH (ref 70–99)
GLUCOSE BLDC GLUCOMTR-MCNC: 207 MG/DL — HIGH (ref 70–99)
GLUCOSE SERPL-MCNC: 175 MG/DL — HIGH (ref 70–99)
HCT VFR BLD CALC: 33.8 % — LOW (ref 39–50)
HCT VFR BLD CALC: 35.3 % — LOW (ref 39–50)
HGB BLD-MCNC: 11.3 G/DL — LOW (ref 13–17)
HGB BLD-MCNC: 11.8 G/DL — LOW (ref 13–17)
IMM GRANULOCYTES NFR BLD AUTO: 0.4 % — SIGNIFICANT CHANGE UP (ref 0–1.5)
LYMPHOCYTES # BLD AUTO: 1.43 K/UL — SIGNIFICANT CHANGE UP (ref 1–3.3)
LYMPHOCYTES # BLD AUTO: 13.4 % — SIGNIFICANT CHANGE UP (ref 13–44)
MAGNESIUM SERPL-MCNC: 1.8 MG/DL — SIGNIFICANT CHANGE UP (ref 1.6–2.6)
MCHC RBC-ENTMCNC: 31.4 PG — SIGNIFICANT CHANGE UP (ref 27–34)
MCHC RBC-ENTMCNC: 31.5 PG — SIGNIFICANT CHANGE UP (ref 27–34)
MCHC RBC-ENTMCNC: 33.4 % — SIGNIFICANT CHANGE UP (ref 32–36)
MCHC RBC-ENTMCNC: 33.4 % — SIGNIFICANT CHANGE UP (ref 32–36)
MCV RBC AUTO: 93.9 FL — SIGNIFICANT CHANGE UP (ref 80–100)
MCV RBC AUTO: 94.1 FL — SIGNIFICANT CHANGE UP (ref 80–100)
MONOCYTES # BLD AUTO: 0.95 K/UL — HIGH (ref 0–0.9)
MONOCYTES NFR BLD AUTO: 8.9 % — SIGNIFICANT CHANGE UP (ref 2–14)
NEUTROPHILS # BLD AUTO: 8.11 K/UL — HIGH (ref 1.8–7.4)
NEUTROPHILS NFR BLD AUTO: 75.6 % — SIGNIFICANT CHANGE UP (ref 43–77)
NRBC # FLD: 0 K/UL — SIGNIFICANT CHANGE UP (ref 0–0)
NRBC # FLD: 0 K/UL — SIGNIFICANT CHANGE UP (ref 0–0)
PHOSPHATE SERPL-MCNC: 3.4 MG/DL — SIGNIFICANT CHANGE UP (ref 2.5–4.5)
PLATELET # BLD AUTO: 159 K/UL — SIGNIFICANT CHANGE UP (ref 150–400)
PLATELET # BLD AUTO: 165 K/UL — SIGNIFICANT CHANGE UP (ref 150–400)
PMV BLD: 11.7 FL — SIGNIFICANT CHANGE UP (ref 7–13)
PMV BLD: 11.8 FL — SIGNIFICANT CHANGE UP (ref 7–13)
POTASSIUM SERPL-MCNC: 3.3 MMOL/L — LOW (ref 3.5–5.3)
POTASSIUM SERPL-SCNC: 3.3 MMOL/L — LOW (ref 3.5–5.3)
RBC # BLD: 3.6 M/UL — LOW (ref 4.2–5.8)
RBC # BLD: 3.75 M/UL — LOW (ref 4.2–5.8)
RBC # FLD: 13.4 % — SIGNIFICANT CHANGE UP (ref 10.3–14.5)
RBC # FLD: 13.5 % — SIGNIFICANT CHANGE UP (ref 10.3–14.5)
SODIUM SERPL-SCNC: 139 MMOL/L — SIGNIFICANT CHANGE UP (ref 135–145)
WBC # BLD: 10.71 K/UL — HIGH (ref 3.8–10.5)
WBC # BLD: 9.39 K/UL — SIGNIFICANT CHANGE UP (ref 3.8–10.5)
WBC # FLD AUTO: 10.71 K/UL — HIGH (ref 3.8–10.5)
WBC # FLD AUTO: 9.39 K/UL — SIGNIFICANT CHANGE UP (ref 3.8–10.5)

## 2019-06-03 PROCEDURE — 99232 SBSQ HOSP IP/OBS MODERATE 35: CPT

## 2019-06-03 RX ORDER — INSULIN GLARGINE 100 [IU]/ML
12 INJECTION, SOLUTION SUBCUTANEOUS AT BEDTIME
Refills: 0 | Status: DISCONTINUED | OUTPATIENT
Start: 2019-06-03 | End: 2019-06-04

## 2019-06-03 RX ORDER — POTASSIUM CHLORIDE 20 MEQ
20 PACKET (EA) ORAL ONCE
Refills: 0 | Status: COMPLETED | OUTPATIENT
Start: 2019-06-03 | End: 2019-06-03

## 2019-06-03 RX ADMIN — AMLODIPINE BESYLATE 10 MILLIGRAM(S): 2.5 TABLET ORAL at 06:14

## 2019-06-03 RX ADMIN — HEPARIN SODIUM 5000 UNIT(S): 5000 INJECTION INTRAVENOUS; SUBCUTANEOUS at 06:14

## 2019-06-03 RX ADMIN — PRAMIPEXOLE DIHYDROCHLORIDE 0.75 MILLIGRAM(S): 0.12 TABLET ORAL at 22:55

## 2019-06-03 RX ADMIN — ATENOLOL 50 MILLIGRAM(S): 25 TABLET ORAL at 11:59

## 2019-06-03 RX ADMIN — Medication 40 MILLIGRAM(S): at 06:15

## 2019-06-03 RX ADMIN — SIMVASTATIN 5 MILLIGRAM(S): 20 TABLET, FILM COATED ORAL at 22:56

## 2019-06-03 RX ADMIN — QUETIAPINE FUMARATE 50 MILLIGRAM(S): 200 TABLET, FILM COATED ORAL at 22:56

## 2019-06-03 RX ADMIN — INSULIN GLARGINE 12 UNIT(S): 100 INJECTION, SOLUTION SUBCUTANEOUS at 22:55

## 2019-06-03 RX ADMIN — MEMANTINE HYDROCHLORIDE 10 MILLIGRAM(S): 10 TABLET ORAL at 06:14

## 2019-06-03 RX ADMIN — Medication 20 MILLIEQUIVALENT(S): at 13:54

## 2019-06-03 RX ADMIN — Medication 81 MILLIGRAM(S): at 11:59

## 2019-06-03 RX ADMIN — Medication 112 MICROGRAM(S): at 06:15

## 2019-06-03 RX ADMIN — HEPARIN SODIUM 5000 UNIT(S): 5000 INJECTION INTRAVENOUS; SUBCUTANEOUS at 17:43

## 2019-06-03 RX ADMIN — Medication 1: at 12:48

## 2019-06-03 RX ADMIN — Medication 1: at 17:43

## 2019-06-03 RX ADMIN — Medication 1: at 08:44

## 2019-06-03 RX ADMIN — DULOXETINE HYDROCHLORIDE 30 MILLIGRAM(S): 30 CAPSULE, DELAYED RELEASE ORAL at 22:56

## 2019-06-03 NOTE — PROGRESS NOTE ADULT - PROBLEM SELECTOR PLAN 1
Suspect medication induced from benzo use, causing toxic encephalopathy. Low concern for infectious process at this time despite CT findings (which pt's son states is chronic) since pt afebrile and without leukocytosis or pulmonary symptoms. CVA or acute cerebral pathology r/o by negative CT head.  - avoid benzos and other sedating meds Suspect medication induced from benzo use, causing toxic encephalopathy. Low concern for infectious process at this time despite CT findings (which pt's son states is chronic) since pt afebrile and without leukocytosis or pulmonary symptoms. CVA or acute cerebral pathology r/o by negative CT head.  - avoid benzos and other sedating meds  -pt with mild leucocytosis, no evidence of infection, will continue to monitor

## 2019-06-03 NOTE — PROGRESS NOTE ADULT - PROBLEM SELECTOR PLAN 2
Patient on basal-bolus insulin therapy at home --> Toujeo 10 units at bedtime and Humalog 5 units at breakfast and 7 units at lunchtime. Currently FS elevated.   - start Lantus 10 units at bedtime  - check FS qid and correctional scale. Will adjust throughout hospitalization Patient on basal-bolus insulin therapy at home --> Toujeo 10 units at bedtime and Humalog 5 units at breakfast and 7 units at lunchtime. Currently FS elevated.   - started  Lantus 10 units at bedtime, will increase to 12 units   - check FS qid and correctional scale. Will adjust throughout hospitalization

## 2019-06-03 NOTE — PROGRESS NOTE ADULT - ATTENDING COMMENTS
DC planning to [facility] pending auth DC planning to [facility] pending auth  plan of care d/w son at bedside   case also d/w PMD Dr Schrader

## 2019-06-03 NOTE — PROGRESS NOTE ADULT - SUBJECTIVE AND OBJECTIVE BOX
Patient is a 89y old  Male who presents with a chief complaint of Loss of Consciousness (02 Jun 2019 17:45)      SUBJECTIVE / OVERNIGHT EVENTS: patient seen and examined by bedside, no acute distress noted  as per son pt was sundowning at night       MEDICATIONS  (STANDING):  amLODIPine   Tablet 10 milliGRAM(s) Oral daily  aspirin  chewable 81 milliGRAM(s) Oral daily  ATENolol  Tablet 50 milliGRAM(s) Oral daily  dextrose 5%. 1000 milliLiter(s) (50 mL/Hr) IV Continuous <Continuous>  dextrose 50% Injectable 12.5 Gram(s) IV Push once  dextrose 50% Injectable 25 Gram(s) IV Push once  dextrose 50% Injectable 25 Gram(s) IV Push once  donepezil 10 milliGRAM(s) Oral at bedtime  DULoxetine 30 milliGRAM(s) Oral at bedtime  furosemide    Tablet 40 milliGRAM(s) Oral daily  heparin  Injectable 5000 Unit(s) SubCutaneous every 12 hours  insulin glargine Injectable (LANTUS) 10 Unit(s) SubCutaneous at bedtime  insulin lispro (HumaLOG) corrective regimen sliding scale   SubCutaneous three times a day before meals  insulin lispro (HumaLOG) corrective regimen sliding scale   SubCutaneous at bedtime  levothyroxine 112 MICROGram(s) Oral daily  memantine 10 milliGRAM(s) Oral two times a day  pramipexole 0.75 milliGRAM(s) Oral at bedtime  QUEtiapine 50 milliGRAM(s) Oral at bedtime  simvastatin 5 milliGRAM(s) Oral at bedtime    MEDICATIONS  (PRN):  dextrose 40% Gel 15 Gram(s) Oral once PRN Blood Glucose LESS THAN 70 milliGRAM(s)/deciLiter  glucagon  Injectable 1 milliGRAM(s) IntraMuscular once PRN Glucose <70 milliGRAM(s)/deciLiter      Vital Signs Last 24 Hrs  T(C): 36.8 (03 Jun 2019 11:57), Max: 36.8 (02 Jun 2019 19:49)  T(F): 98.2 (03 Jun 2019 11:57), Max: 98.3 (02 Jun 2019 19:49)  HR: 66 (03 Jun 2019 11:57) (61 - 66)  BP: 113/52 (03 Jun 2019 11:57) (113/52 - 185/57)  BP(mean): --  RR: 18 (03 Jun 2019 11:57) (18 - 18)  SpO2: 97% (03 Jun 2019 11:57) (96% - 99%)  CAPILLARY BLOOD GLUCOSE      POCT Blood Glucose.: 191 mg/dL (03 Jun 2019 12:18)  POCT Blood Glucose.: 165 mg/dL (03 Jun 2019 08:25)  POCT Blood Glucose.: 237 mg/dL (02 Jun 2019 22:16)  POCT Blood Glucose.: 235 mg/dL (02 Jun 2019 17:09)    I&O's Summary    PHYSICAL EXAM  GENERAL: NAD, well appearing  CHEST/LUNG: Clear to auscultation bilaterally; No wheeze  HEART: Regular rate and rhythm; No murmurs, rubs, or gallops  ABDOMEN: Soft, Nontender, Nondistended; Bowel sounds present  EXTREMITIES:  2+ Peripheral Pulses, No clubbing, cyanosis, or edema  PSYCH: Alert, conversant, cooperative, follows simple commands, recognizes familiar faces and items        LABS:                        11.3   10.71 )-----------( 165      ( 03 Jun 2019 06:55 )             33.8     06-03    139  |  99  |  36<H>  ----------------------------<  175<H>  3.3<L>   |  25  |  1.41<H>    Ca    9.0      03 Jun 2019 06:55  Phos  3.4     06-03  Mg     1.8     06-03                RADIOLOGY & ADDITIONAL TESTS:    Imaging Personally Reviewed:    Consultant(s) Notes Reviewed:      Care Discussed with Consultants/Other Providers:

## 2019-06-03 NOTE — PROGRESS NOTE ADULT - PROBLEM SELECTOR PLAN 4
Patient appears to have CKD given SCr of 1.5 or greater on previous hospitalizations, unclear baseline, but appears stable.  - avoid nephrotoxins where possible. Patient appears to have CKD given SCr of 1.5 or greater on previous hospitalizations, unclear baseline, but appears stable.  - avoid nephrotoxins where possible.  -hypokalemia: will supplement , monitor BMP

## 2019-06-03 NOTE — PROGRESS NOTE ADULT - PROBLEM SELECTOR PLAN 9
Patient's med reconciliation performed. Med rec pharmacist notes some medications still need to be verified with pharmacy.  - will contact pt's PMD, Dr. Alon Schrader [phone] for med list.    DISPO: Patient evaluated by PT and deemed candidate for subacute rehab. Will f/u with SW. Pt is currently medically stable with no active issues. Anticipate d/c to rehab on Monday, 6/3 Patient's med reconciliation performed. Med rec pharmacist notes some medications still need to be verified with pharmacy.  DISPO: Patient evaluated by PT and deemed candidate for subacute rehab. Will f/u with [facility]. Pt is currently medically stable with no active issues. Anticipate d/c to rehab on Monday, 6/3

## 2019-06-03 NOTE — PROGRESS NOTE ADULT - PROBLEM SELECTOR PLAN 6
Will discuss with pt's PMD regarding discontinuing donezepil and memantine because likely of little effect and would like to reduce risk of polypharmacy  - will reach out to pt's PMD on Monday, Dr. Alon Schrader [phone]  - will continue memantine and donezepil for now  - if pt with behavorial disturbances while inpatient, will consider psych consult for further management discussed  with pt's PMD regarding discontinuing donezepil and memantine because likely of little effect and would like to reduce risk of polypharmacy, PMD DR Schrader agree with stopping memantine and donezepil  - on  memantine and donezepil for now  - if pt with behavorial disturbances while inpatient, will consider psych consult for further management, c/w seroquel   As per PMD, pt has hx of sundowning

## 2019-06-04 ENCOUNTER — TRANSCRIPTION ENCOUNTER (OUTPATIENT)
Age: 84
End: 2019-06-04

## 2019-06-04 VITALS
OXYGEN SATURATION: 97 % | SYSTOLIC BLOOD PRESSURE: 152 MMHG | HEART RATE: 62 BPM | TEMPERATURE: 98 F | DIASTOLIC BLOOD PRESSURE: 68 MMHG | RESPIRATION RATE: 18 BRPM

## 2019-06-04 LAB
GLUCOSE BLDC GLUCOMTR-MCNC: 234 MG/DL — HIGH (ref 70–99)
GLUCOSE BLDC GLUCOMTR-MCNC: 88 MG/DL — SIGNIFICANT CHANGE UP (ref 70–99)

## 2019-06-04 PROCEDURE — 99239 HOSP IP/OBS DSCHRG MGMT >30: CPT

## 2019-06-04 RX ORDER — QUETIAPINE FUMARATE 200 MG/1
2 TABLET, FILM COATED ORAL
Qty: 0 | Refills: 0 | DISCHARGE

## 2019-06-04 RX ORDER — MEMANTINE HYDROCHLORIDE 10 MG/1
1 TABLET ORAL
Qty: 0 | Refills: 0 | DISCHARGE

## 2019-06-04 RX ORDER — QUETIAPINE FUMARATE 200 MG/1
1 TABLET, FILM COATED ORAL
Qty: 0 | Refills: 0 | DISCHARGE
Start: 2019-06-04

## 2019-06-04 RX ORDER — PRAMIPEXOLE DIHYDROCHLORIDE 0.12 MG/1
1 TABLET ORAL
Qty: 0 | Refills: 0 | DISCHARGE
Start: 2019-06-04

## 2019-06-04 RX ORDER — PRAMIPEXOLE DIHYDROCHLORIDE 0.12 MG/1
3 TABLET ORAL
Qty: 0 | Refills: 0 | DISCHARGE

## 2019-06-04 RX ORDER — INSULIN GLARGINE 100 [IU]/ML
10 INJECTION, SOLUTION SUBCUTANEOUS
Qty: 0 | Refills: 0 | DISCHARGE

## 2019-06-04 RX ORDER — DONEPEZIL HYDROCHLORIDE 10 MG/1
1 TABLET, FILM COATED ORAL
Qty: 0 | Refills: 0 | DISCHARGE

## 2019-06-04 RX ADMIN — AMLODIPINE BESYLATE 10 MILLIGRAM(S): 2.5 TABLET ORAL at 06:45

## 2019-06-04 RX ADMIN — Medication 2: at 12:38

## 2019-06-04 RX ADMIN — ATENOLOL 50 MILLIGRAM(S): 25 TABLET ORAL at 06:45

## 2019-06-04 RX ADMIN — HEPARIN SODIUM 5000 UNIT(S): 5000 INJECTION INTRAVENOUS; SUBCUTANEOUS at 06:45

## 2019-06-04 RX ADMIN — Medication 112 MICROGRAM(S): at 06:45

## 2019-06-04 RX ADMIN — Medication 81 MILLIGRAM(S): at 12:38

## 2019-06-04 RX ADMIN — Medication 40 MILLIGRAM(S): at 06:45

## 2019-06-04 NOTE — PROGRESS NOTE ADULT - ATTENDING COMMENTS
Going to [facility], appreciate CM/SW assistance. Patient in agreement with d/c plan.     Discharge time 35 minutes.

## 2019-06-04 NOTE — PROGRESS NOTE ADULT - PROBLEM SELECTOR PLAN 9
DISPO: Patient evaluated by PT and deemed candidate for subacute rehab. Pt is currently medically stable with no active issues.

## 2019-06-04 NOTE — PROGRESS NOTE ADULT - SUBJECTIVE AND OBJECTIVE BOX
[facility] Medicine  Pager [phone]    Patient is a 89y old  Male who presents with a chief complaint of Loss of Consciousness (04 Jun 2019 09:25)      INTERVAL HPI/OVERNIGHT EVENTS:    MEDICATIONS  (STANDING):  amLODIPine   Tablet 10 milliGRAM(s) Oral daily  aspirin  chewable 81 milliGRAM(s) Oral daily  ATENolol  Tablet 50 milliGRAM(s) Oral daily  dextrose 5%. 1000 milliLiter(s) (50 mL/Hr) IV Continuous <Continuous>  dextrose 50% Injectable 12.5 Gram(s) IV Push once  dextrose 50% Injectable 25 Gram(s) IV Push once  dextrose 50% Injectable 25 Gram(s) IV Push once  DULoxetine 30 milliGRAM(s) Oral at bedtime  furosemide    Tablet 40 milliGRAM(s) Oral daily  heparin  Injectable 5000 Unit(s) SubCutaneous every 12 hours  insulin glargine Injectable (LANTUS) 12 Unit(s) SubCutaneous at bedtime  insulin lispro (HumaLOG) corrective regimen sliding scale   SubCutaneous three times a day before meals  insulin lispro (HumaLOG) corrective regimen sliding scale   SubCutaneous at bedtime  levothyroxine 112 MICROGram(s) Oral daily  pramipexole 0.75 milliGRAM(s) Oral at bedtime  QUEtiapine 50 milliGRAM(s) Oral at bedtime  simvastatin 5 milliGRAM(s) Oral at bedtime    MEDICATIONS  (PRN):  dextrose 40% Gel 15 Gram(s) Oral once PRN Blood Glucose LESS THAN 70 milliGRAM(s)/deciLiter  glucagon  Injectable 1 milliGRAM(s) IntraMuscular once PRN Glucose <70 milliGRAM(s)/deciLiter      Allergies    No Known Allergies    Intolerances        REVIEW OF SYSTEMS:  Please see interval HPI:    Vital Signs Last 24 Hrs  T(C): 36.9 (04 Jun 2019 06:39), Max: 36.9 (04 Jun 2019 06:39)  T(F): 98.4 (04 Jun 2019 06:39), Max: 98.4 (04 Jun 2019 06:39)  HR: 57 (04 Jun 2019 06:39) (57 - 63)  BP: 146/52 (04 Jun 2019 06:39) (146/52 - 159/51)  BP(mean): --  RR: 17 (04 Jun 2019 06:39) (17 - 19)  SpO2: 100% (04 Jun 2019 06:39) (98% - 100%)  I&O's Detail        PHYSICAL EXAM:  GENERAL:   HEAD:    EYES:   ENMT:   NECK:   NERVOUS SYSTEM:    CHEST/LUNG:   HEART:   ABDOMEN:   EXTREMITIES:    LYMPH:   SKIN:     LABS:                        11.8   9.39  )-----------( 159      ( 03 Jun 2019 17:22 )             35.3       Ca    9.0        03 Jun 2019 06:55        CAPILLARY BLOOD GLUCOSE      POCT Blood Glucose.: 234 mg/dL (04 Jun 2019 12:10)  POCT Blood Glucose.: 88 mg/dL (04 Jun 2019 08:13)  POCT Blood Glucose.: 207 mg/dL (03 Jun 2019 22:05)  POCT Blood Glucose.: 160 mg/dL (03 Jun 2019 17:36)    BLOOD CULTURE    RADIOLOGY & ADDITIONAL TESTS:    Imaging Personally Reviewed:  [ ] YES     Consultant(s) Notes Reviewed:      Care Discussed with Consultants/Other Providers: [facility] Medicine  Pager [phone]    Patient is a 89y old  Male who presents with a chief complaint of Loss of Consciousness (04 Jun 2019 09:25)      INTERVAL HPI/OVERNIGHT EVENTS:  "What can you do for me?" Curses God because he lost his wife and daughter. Understands that he is going to facility today. States "M[place] as well put me [place] the [place]". Denies pain. States he is eating well and that is why his sugar is in the 200s. Emotional support provided.     MEDICATIONS  (STANDING):  amLODIPine   Tablet 10 milliGRAM(s) Oral daily  aspirin  chewable 81 milliGRAM(s) Oral daily  ATENolol  Tablet 50 milliGRAM(s) Oral daily  dextrose 5%. 1000 milliLiter(s) (50 mL/Hr) IV Continuous <Continuous>  dextrose 50% Injectable 12.5 Gram(s) IV Push once  dextrose 50% Injectable 25 Gram(s) IV Push once  dextrose 50% Injectable 25 Gram(s) IV Push once  DULoxetine 30 milliGRAM(s) Oral at bedtime  furosemide    Tablet 40 milliGRAM(s) Oral daily  heparin  Injectable 5000 Unit(s) SubCutaneous every 12 hours  insulin glargine Injectable (LANTUS) 12 Unit(s) SubCutaneous at bedtime  insulin lispro (HumaLOG) corrective regimen sliding scale   SubCutaneous three times a day before meals  insulin lispro (HumaLOG) corrective regimen sliding scale   SubCutaneous at bedtime  levothyroxine 112 MICROGram(s) Oral daily  pramipexole 0.75 milliGRAM(s) Oral at bedtime  QUEtiapine 50 milliGRAM(s) Oral at bedtime  simvastatin 5 milliGRAM(s) Oral at bedtime    MEDICATIONS  (PRN):  dextrose 40% Gel 15 Gram(s) Oral once PRN Blood Glucose LESS THAN 70 milliGRAM(s)/deciLiter  glucagon  Injectable 1 milliGRAM(s) IntraMuscular once PRN Glucose <70 milliGRAM(s)/deciLiter    Allergies  No Known Allergies    Intolerances    REVIEW OF SYSTEMS:  Please see interval HPI:    Vital Signs Last 24 Hrs  T(C): 36.9 (04 Jun 2019 06:39), Max: 36.9 (04 Jun 2019 06:39)  T(F): 98.4 (04 Jun 2019 06:39), Max: 98.4 (04 Jun 2019 06:39)  HR: 57 (04 Jun 2019 06:39) (57 - 63)  BP: 146/52 (04 Jun 2019 06:39) (146/52 - 159/51)  BP(mean): --  RR: 17 (04 Jun 2019 06:39) (17 - 19)  SpO2: 100% (04 Jun 2019 06:39) (98% - 100%)  I&O's Detail    PHYSICAL EXAM:  GENERAL: NAD, lying in bed  HEAD: NC/AT   EYES: clear sclera/conjunctiva  ENMT: MMM  NECK: supple  NERVOUS SYSTEM: moving all extremities, non-focal    CHEST/LUNG: comfortable on RA, CTAB  HEART: S1S2 RRR  ABDOMEN: soft, non-tender  EXTREMITIES:  no c/c/e    LABS:                        11.8   9.39  )-----------( 159      ( 03 Jun 2019 17:22 )             35.3       Ca    9.0        03 Jun 2019 06:55      CAPILLARY BLOOD GLUCOSE  POCT Blood Glucose.: 234 mg/dL (04 Jun 2019 12:10)  POCT Blood Glucose.: 88 mg/dL (04 Jun 2019 08:13)  POCT Blood Glucose.: 207 mg/dL (03 Jun 2019 22:05)  POCT Blood Glucose.: 160 mg/dL (03 Jun 2019 17:36)    BLOOD CULTURE    RADIOLOGY & ADDITIONAL TESTS:    Imaging Personally Reviewed:  [ ] YES     Consultant(s) Notes Reviewed:      Care Discussed with Consultants/Other Providers: YANI Shields

## 2019-06-04 NOTE — DISCHARGE NOTE PROVIDER - NSDCCPCAREPLAN_GEN_ALL_CORE_FT
PRINCIPAL DISCHARGE DIAGNOSIS  Diagnosis: Altered mental status  Assessment and Plan of Treatment: You were seen at [facility] for altered mental status. You had CT scan, blood work, and a chest xray which did not reveal any infectious or Neurological cause. The physicians examined your medication list and you liekly had change in your mental status due to Polypharmacy (unnecessary medications).   You are to stop Memantine and Donepazil and are to continue with your medications as prescribed on the discharge summary.   You are also to follwo-up with [facility] primary care physician in the next 1-2 weeks for further evalaution and management.   You are to return for any falls, further alteration in mental stauts, fevers for greater than 100.3F, or inability to tolerate oral liquids to maintain hydration.      SECONDARY DISCHARGE DIAGNOSES  Diagnosis: Chronic kidney disease  Assessment and Plan of Treatment: While at [facility] you had no active issues with your chronic kidney disease.  You are to continue the medications as prescribed on the discharge paperwork.   You are to follow-up with your primary care doctor in the next 1-2 weeks for further management and evaluation.   You are to return for any falls, new shortness of breath, further alteration in mental stauts, fevers for greater than 100.3F, or inability to tolerate oral liquids to maintain hydration.    Diagnosis: Chronic heart failure with preserved ejection fraction  Assessment and Plan of Treatment: While at [facility] you had no active issues with your chronic heart failure.  You are to continue the medications as prescribed on the discharge paperwork.   You are to follow-up with your primary care doctor in the next 1-2 weeks for further management and evaluation.  You should return to the hospital if you begin to have persistent chest pain especially that ratiates to the arm/jaw/back, shortness of breath or chest pain with exertion that is different than normal for you, worsening in any lower extremity edema (swelling), sudden onset of cold sweats with difficulty breathing, or for any other concerns.  Form more information on Congestive Heart Failure please visit the American Heart Association's Website at: [email or web address]    Diagnosis: Essential hypertension  Assessment and Plan of Treatment: While at [facility] you had no active issues with your high blood pressure.  You are to continue the medications as prescribed on the discharge paperwork.   You are to follow-up with your primary care doctor in the next 1-2 weeks for further management and evaluation.  You should return to the hospital if you begin to have persistent chest pain especially that ratiates to the arm/jaw/back, shortness of breath or chest pain with exertion that is different than normal for you, new or worsening in any lower extremity edema (swelling), sudden onset of cold sweats with difficulty breathing, or for any other concerns.  Form more information on Heart Health please visit the American Heart Association's Website at: [email or web address]    Diagnosis: Type 2 diabetes mellitus  Assessment and Plan of Treatment: While at [facility] you had no active issues with your diabetes.  You are to continue the medications as prescribed on the discharge paperwork.   You are to follow-up with your primary care doctor in the next 1-2 weeks for further management and evaluation.   You are to return for any falls, new shortness of breath, further alteration in mental stauts, fevers for greater than 100.3F, or inability to tolerate oral liquids to maintain hydration. PRINCIPAL DISCHARGE DIAGNOSIS  Diagnosis: Metabolic encephalopathy  Assessment and Plan of Treatment: You were seen at [facility] for altered mental status. You had CT scan, blood work, and a chest xray which did not reveal any infectious or Neurological cause. The physicians examined your medication list and you liekly had change in your mental status due to Polypharmacy (unnecessary medications).   You are to stop Memantine and Donepazil and are to continue with your medications as prescribed on the discharge summary.   You are also to follwo-up with [facility] primary care physician in the next 1-2 weeks for further evalaution and management.   You are to return for any falls, further alteration in mental stauts, fevers for greater than 100.3F, or inability to tolerate oral liquids to maintain hydration.      SECONDARY DISCHARGE DIAGNOSES  Diagnosis: Chronic heart failure with preserved ejection fraction  Assessment and Plan of Treatment: While at [facility] you had no active issues with your chronic heart failure.  You are to continue the medications as prescribed on the discharge paperwork.   You are to follow-up with your primary care doctor in the next 1-2 weeks for further management and evaluation.  You should return to the hospital if you begin to have persistent chest pain especially that ratiates to the arm/jaw/back, shortness of breath or chest pain with exertion that is different than normal for you, worsening in any lower extremity edema (swelling), sudden onset of cold sweats with difficulty breathing, or for any other concerns.  Form more information on Congestive Heart Failure please visit the American Heart Association's Website at: [email or web address]    Diagnosis: Chronic kidney disease  Assessment and Plan of Treatment: While at [facility] you had no active issues with your chronic kidney disease.  You are to continue the medications as prescribed on the discharge paperwork.   You are to follow-up with your primary care doctor in the next 1-2 weeks for further management and evaluation.   You are to return for any falls, new shortness of breath, further alteration in mental stauts, fevers for greater than 100.3F, or inability to tolerate oral liquids to maintain hydration.    Diagnosis: Essential hypertension  Assessment and Plan of Treatment: While at [facility] you had no active issues with your high blood pressure.  You are to continue the medications as prescribed on the discharge paperwork.   You are to follow-up with your primary care doctor in the next 1-2 weeks for further management and evaluation.  You should return to the hospital if you begin to have persistent chest pain especially that ratiates to the arm/jaw/back, shortness of breath or chest pain with exertion that is different than normal for you, new or worsening in any lower extremity edema (swelling), sudden onset of cold sweats with difficulty breathing, or for any other concerns.  Form more information on Heart Health please visit the American Heart Association's Website at: [email or web address]    Diagnosis: Type 2 diabetes mellitus  Assessment and Plan of Treatment: While at [facility] you had no active issues with your diabetes.  You are to continue the medications as prescribed on the discharge paperwork.   You are to follow-up with your primary care doctor in the next 1-2 weeks for further management and evaluation.   You are to return for any falls, new shortness of breath, further alteration in mental stauts, fevers for greater than 100.3F, or inability to tolerate oral liquids to maintain hydration.

## 2019-06-04 NOTE — DISCHARGE NOTE PROVIDER - CARE PROVIDER_API CALL
Alon Schrader)  Geriatric Medicine; [facility]  [address]  [place] [address]  Phone: [phone]  Fax: [phone]  Follow Up Time:

## 2019-06-04 NOTE — PROGRESS NOTE ADULT - PROBLEM SELECTOR PLAN 4
Patient appears to have CKD3 given SCr of 1.5 or greater on previous hospitalizations, unclear baseline, but appears stable.  - avoid nephrotoxins where possible.  - hypokalemia yesterday supplement

## 2019-06-04 NOTE — PROGRESS NOTE ADULT - ASSESSMENT
89 yo M w/ T2DM, HTN, HLD, Dementia, HFpEF (echo from July 2018 shows EF 50%, mild diastolic dysfunction) and hypothyroidism admitted for acute toxic encephalopathy due to taking an extra Klonopin dose as recommended by PCP. Pt currently back at baseline mentation. However, family states unsafe for patient to return home as he lives by himself (wife recently passed away) and has been having increased episodes of agitation. Patient now pending facility placement.

## 2019-06-04 NOTE — PROGRESS NOTE ADULT - PROBLEM SELECTOR PLAN 3
c/w home HTN medications as previously prescribed. BP satisfactory at this time

## 2019-06-04 NOTE — DISCHARGE NOTE NURSING/CASE MANAGEMENT/SOCIAL WORK - NSDCDPATPORTLINK_GEN_ALL_CORE
You can access the [facility] Patient Portal, offered by [facility], by registering with the following website: [email or web address]

## 2019-06-04 NOTE — PROGRESS NOTE ADULT - PROBLEM SELECTOR PLAN 5
Not in acute chf right now. c/w Lasix as previously prescribed
Not in acute heart failure exacerbation at this time, euvolemic. c/w Lasix as previously prescribed

## 2019-06-04 NOTE — PROGRESS NOTE ADULT - PROBLEM SELECTOR PROBLEM 4
Chronic kidney disease

## 2019-06-04 NOTE — PROGRESS NOTE ADULT - PROBLEM SELECTOR PROBLEM 5
Chronic heart failure with preserved ejection fraction

## 2019-06-04 NOTE — PROGRESS NOTE ADULT - PROBLEM SELECTOR PLAN 6
previously discussed  with pt's PMD regarding discontinuing donezepil and memantine because likely of little effect and would like to reduce risk of polypharmacy, PMD DR Schrader agrees with stopping memantine and donezepil  - if pt with behavorial disturbances while inpatient, will consider psych consult for further management, c/w seroquel   As per PMD, pt has hx of sundowning

## 2019-06-04 NOTE — DISCHARGE NOTE PROVIDER - NSCORESITESY/N_GEN_A_CORE_RD
Discharge Planning Assessment  [facility]     Patient Name: Robbie Carmen  MRN: [id number]  Today's Date: 8/22/2017    Admit Date: 8/21/2017          Discharge Needs Assessment       08/22/17 0952    Living Environment    Lives With spouse    Living Arrangements house    Home Accessibility bed and bath on same level;stairs to enter home;stairs within home;tub/shower is not walk in    Number of Stairs to Enter Home 4    Number of Stairs Within Home 13    Stair Railings at Home outside, present on left side    Transportation Available car;family or friend will provide    Living Environment Comment Lives with Spouse.    Home [place] with full living on Main level with a basement.     Has 4 steps to enter front with a railing and this entrance is used most.      they have 3 daughters with 1 dtr. living in [place] and 1 in [facility], and 1 in [place], [place].      Living Environment    Provides Primary Care For no one    Primary Care Provided By spouse/significant other    Quality Of Family Relationships supportive;involved;helpful    Able to Return to Prior Living Arrangements yes    Discharge Needs Assessment    Concerns To Be Addressed discharge planning concerns    Concerns Comments Pt was ordered to have Home Health SN/PT/OT thru [facility] in [place] when discharged from [facility]   [facility] unable to make first visit yet d/t coordinating with pt and wife.     Pt. wishes to still have Home health come for SN/PT/OT.        Equipment Currently Used at Home bipap/ cpap;oxygen;glucometer   Oxygen NC at bedtime is thru [facility] in [place].     CPAP at bedtime.     Has B/P Cuff and glucometer.        Discharge Contact Information if Applicable Spouse:  Ben, Cell #: [phone].     Pt's cell #:  [phone]    Discharge Planning Comments Will need RESUME Home Health Orders for SN/PT/OT at discharge, please.       [facility] in [place] was arranged when d/c from [facility] and will need to be resumed.                Discharge Plan       08/22/17 1003    Case Management/Social Work Plan    Plan Home with Home Health for SN/PT/OT.    Need Home Health order     Patient/Family In Agreement With Plan yes    Additional Comments CM met with pt. and his wife, Hortencia, #[phone], at bedside.    Per pt. and his wife, they live in [place] in a [place] home with a basement.    All living on first level.    They have a tub/shower combo with no grab bars  and no seating.      Home Health:    [facility] in [place] for SN/PT/OT was ordered when d/c from [facility]     Will need a resume order at discharge.          DME:   Oxygen NC at bedtime  is thru [facility] in [place].    They do have a portable tank, but not with them.     CPAP at bedtime.     Also, a Straight cane(that was his Dads), a B/P cuff, and glucometer.       Have family support of 3 daughters, of which one lives in [place].        ADLs:   Pt was Independent with adls and mobility prior to this incident..     His wife is home with him to assist and takes care of meals, cleaning, etc.      Pt and wife state [facility] for SN/PT/OT was arranged when d/c from [facility] and would like to continue with this service.     Will need an order to resume SN/PT/OT for Home Health at discharge.           Trans: Wife is available to transport as needed.      Pharmacy:   for new Scripts use [facility] in [place].     Long term scripts use [employer] mail order.      Insurance is current and PCP is still Dr. Justin Mccormack.        Plan:  Home with wife and resume Home Health for SN/PT/OT thru [facility]  in [place].   please order.    CM following.        @ 10:15:  [facility]:   CM made phone call to [facility] in [place] and spoke to ENEDELIA Cash.    Shreya confirms they have the referral which was from pt's PCP, Dr. Mccormack,  And insurance has been Ok'ed.      DX:   [place] Hematoma, diabetes, HTN.      SVS:   SN/PT/OT.      Per ENEDELIA Cash, will need new orders to resume Home Health and Dr. Mccormack will follow pt.       New orders and updated PT/OT notes to Fax#: [phone].     [facility] phone #:  [phone].    Per ENEDELIA Cash, [facility]  will plan to see tomorrow if discharges home today.    CM call them when pt. Discharges.           Discharge Placement     No information found                Demographic Summary       08/22/17 0948    Referral Information    Admission Type observation    Arrived From home or self-care    Referral Source admission list    Contact Information    Permission Granted to Share Information With     Primary Care Physician Information    Name Dr. Justin Mendoza in [place].    Per pt. actively sees.              Functional Status       08/22/17 0950    Functional Status Prior    Ambulation 0-->independent    Transferring 0-->independent    Toileting 0-->independent    Bathing 0-->independent    Dressing 0-->independent    Eating 0-->independent    Communication 0-->understands/communicates without difficulty    Swallowing 0-->swallows foods/liquids without difficulty    IADL    Medications independent    Oral Care independent    Employment/Financial    Employment/Finance Comments Per pt. and wife, current with [employer].       Pharmacy:  [facility] mail order for 3 month scripts.    For NEW Scripts:  [facility] in [place].              Psychosocial     None            Abuse/Neglect     None            Legal     None            Substance Abuse     None            Patient Forms     None          Rema Enriquez RN     No

## 2019-06-04 NOTE — DISCHARGE NOTE PROVIDER - HOSPITAL COURSE
Pt is 89 y/o M w/ a PMHx of T2DM, HTN, HLD, Dementia, CHF (echo from July 2018 shows EF 50%, mild diastolic dysfunction) and hypothyroidism presents with loss of consciousness after taking an extra Klonopin dose as recommended by PCP.  Patient with increased agitation as per son, wife recently passed away about 2 weeks ago. Brought in by EMS, he woke up immediately with the personnel and had no falls/trauma. He denies any recent illnesses, fever, chills, chest pain, abdominal pain, diarrhea or other changes. Son endorses that there is nobody left to help take care of him and is looking for a nursing home.         Patient was admitted to the medicine service for alteration in mental status.     Patient was evaluated clinically and had a Ct chest showing multiple small nodules and small tree in bud appearance with some mucus impacted airways. According to son these findings are all chronic and patient was without signs of infectious etiology and remained a-febrile throughout hospital course. Patient altered mental status was likely not due to infectious process at this time. Patient also had a CT Head showing no signs of infarct, ICH, Mass, or other neurological causes of Altered mental status and central process likely not etiology. We examined the patient's medication list as the Benzodiazepines, memantine, and donepazil likely contributed and caused alterations of the patient's mental status and LOC. Medications were stopped as Altered mental status likely due to Polypharmacy. Discussed medications with patient outpatient provider and agreed to stopping Memantine and Donepazil and will f/u as outpatient. Patient was evaluated by PT and discharged to rehab in improved condition and with stable and normal vitals. Pt is 89 y/o M w/ a PMHx of T2DM, HTN, HLD, Dementia, HFpEF (echo from July 2018 shows EF 50%, mild diastolic dysfunction) and hypothyroidism presents with loss of consciousness after taking an extra Klonopin dose as recommended by PCP.  Patient with increased agitation as per son, wife recently passed away about 2 weeks ago. Brought in by EMS, he woke up immediately with the personnel and had no falls/trauma. He denies any recent illnesses, fever, chills, chest pain, abdominal pain, diarrhea or other changes. Son endorses that there is nobody left to help take care of him and is looking for a nursing home.         Patient was admitted to the medicine service for alteration in mental status.     Patient was evaluated clinically and had a Ct chest showing multiple small nodules and small tree in bud appearance with some mucus impacted airways. According to son these findings are all chronic and patient was without signs of infectious etiology and remained a-febrile throughout hospital course. Patient altered mental status was likely not due to infectious process at this time. Patient also had a CT Head showing no signs of infarct, ICH, Mass, or other neurological causes of Altered mental status and central process likely not etiology. We examined the patient's medication list as the Benzodiazepines, memantine, and donepazil likely contributed and caused alterations of the patient's mental status and LOC. Medications were stopped as patient's metabolic encephalopathy likely due to Polypharmacy. Discussed medications with patient outpatient provider and agreed to stopping Memantine and Donepazil and will f/u as outpatient. Patient was evaluated by PT and discharged to rehab in improved condition and with stable and normal vitals.

## 2019-06-04 NOTE — DISCHARGE NOTE NURSING/CASE MANAGEMENT/SOCIAL WORK - NSDCCRNAME_GEN_ALL_CORE_FT
[facility], [facility] at 1pm Dr Seda Cabello

## 2019-06-04 NOTE — PROGRESS NOTE ADULT - REASON FOR ADMISSION
Loss of Consciousness
Loss of Consciousness
Medication-induced encephalopathy
Loss of Consciousness
Loss of Consciousness

## 2019-06-04 NOTE — PROGRESS NOTE ADULT - PROBLEM SELECTOR PLAN 2
Patient on basal-bolus insulin therapy at home --> Toujeo 10 units at bedtime and Humalog 5 units at breakfast and 7 units at lunchtime.   - c/w Lantus 12U qhs, continue to monitor FS and adjust regimen as necessary, can continue to do so at facility

## 2019-06-04 NOTE — PROGRESS NOTE ADULT - PROBLEM SELECTOR PLAN 1
Suspect medication induced from benzo use, causing toxic metabolic encephalopathy superimposed on underlying dementia. Low concern for infectious process at this time despite CT findings (which pt's son states is chronic) since pt afebrile and without leukocytosis or pulmonary symptoms. CVA or acute cerebral pathology r/o by negative CT head.  - avoid benzos and other sedating meds  - appreciate CM/SW assistance with facility placement

## 2019-06-04 NOTE — PROGRESS NOTE ADULT - PROBLEM SELECTOR PROBLEM 9
Medication management

## 2019-06-04 NOTE — PROGRESS NOTE ADULT - PROBLEM SELECTOR PLAN 7
Diet: Dash, consistent carbohydrate

## 2019-06-23 ENCOUNTER — EMERGENCY (EMERGENCY)
Facility: HOSPITAL | Age: 84
LOS: 1 days | Discharge: NURSING, REHAB OR LONG-TERM CARE | End: 2019-06-23
Attending: EMERGENCY MEDICINE | Admitting: EMERGENCY MEDICINE
Payer: MEDICARE

## 2019-06-23 VITALS
RESPIRATION RATE: 18 BRPM | DIASTOLIC BLOOD PRESSURE: 80 MMHG | HEART RATE: 60 BPM | OXYGEN SATURATION: 98 % | TEMPERATURE: 98 F | SYSTOLIC BLOOD PRESSURE: 170 MMHG

## 2019-06-23 VITALS
HEART RATE: 66 BPM | RESPIRATION RATE: 18 BRPM | OXYGEN SATURATION: 98 % | SYSTOLIC BLOOD PRESSURE: 170 MMHG | DIASTOLIC BLOOD PRESSURE: 68 MMHG | TEMPERATURE: 98 F | HEIGHT: 68 IN | WEIGHT: 149.91 LBS

## 2019-06-23 DIAGNOSIS — Z98.890 OTHER SPECIFIED POSTPROCEDURAL STATES: Chronic | ICD-10-CM

## 2019-06-23 PROBLEM — E03.9 HYPOTHYROIDISM, UNSPECIFIED: Chronic | Status: ACTIVE | Noted: 2019-05-30

## 2019-06-23 PROBLEM — I50.30 UNSPECIFIED DIASTOLIC (CONGESTIVE) HEART FAILURE: Chronic | Status: ACTIVE | Noted: 2019-05-30

## 2019-06-23 PROCEDURE — 90471 IMMUNIZATION ADMIN: CPT

## 2019-06-23 PROCEDURE — 99283 EMERGENCY DEPT VISIT LOW MDM: CPT

## 2019-06-23 PROCEDURE — 99283 EMERGENCY DEPT VISIT LOW MDM: CPT | Mod: 25

## 2019-06-23 PROCEDURE — 90715 TDAP VACCINE 7 YRS/> IM: CPT

## 2019-06-23 RX ORDER — TETANUS TOXOID, REDUCED DIPHTHERIA TOXOID AND ACELLULAR PERTUSSIS VACCINE, ADSORBED 5; 2.5; 8; 8; 2.5 [IU]/.5ML; [IU]/.5ML; UG/.5ML; UG/.5ML; UG/.5ML
0.5 SUSPENSION INTRAMUSCULAR ONCE
Refills: 0 | Status: COMPLETED | OUTPATIENT
Start: 2019-06-23 | End: 2019-06-23

## 2019-06-23 RX ORDER — INSULIN GLARGINE 100 [IU]/ML
12 INJECTION, SOLUTION SUBCUTANEOUS
Qty: 0 | Refills: 0 | DISCHARGE

## 2019-06-23 RX ADMIN — TETANUS TOXOID, REDUCED DIPHTHERIA TOXOID AND ACELLULAR PERTUSSIS VACCINE, ADSORBED 0.5 MILLILITER(S): 5; 2.5; 8; 8; 2.5 SUSPENSION INTRAMUSCULAR at 01:02

## 2019-06-23 NOTE — ED ADULT NURSE NOTE - NSIMPLEMENTINTERV_GEN_ALL_ED
Implemented All Fall Risk Interventions:  [place] to call system. Call bell, personal items and telephone within reach. Instruct patient to call for assistance. Room bathroom lighting operational. Non-slip footwear when patient is off stretcher. Physically safe environment: no spills, clutter or unnecessary equipment. Stretcher in lowest position, wheels locked, appropriate side rails in place. Provide visual cue, wrist band, yellow gown, etc. Monitor gait and stability. Monitor for mental status changes and reorient to person, place, and time. Review medications for side effects contributing to fall risk. Reinforce activity limits and safety measures with patient and family.

## 2019-06-23 NOTE — ED PROVIDER NOTE - ENMT, MLM
Airway patent, Nasal mucosa clear. Mouth with normal mucosa. Throat has no vesicles, no oropharyngeal exudates and uvula is midline.    1.5 cm superficial lac to upper lip

## 2019-06-23 NOTE — ED ADULT NURSE NOTE - PMH
CHF NYHA class I, unspecified failure chronicity, diastolic    Dementia    Diabetes    HTN (hypertension)    Hypothyroid

## 2019-06-23 NOTE — ED PROVIDER NOTE - OBJECTIVE STATEMENT
Patient states he fell getting out of bed. Hit his mouth and got swelling to upper lip. Denies head injury. No LOC. No other injury. Last Td unknown. Patient has no complaints

## 2019-07-18 ENCOUNTER — APPOINTMENT (OUTPATIENT)
Dept: GERIATRICS | Facility: CLINIC | Age: 84
End: 2019-07-18

## 2019-08-16 ENCOUNTER — EMERGENCY (EMERGENCY)
Facility: HOSPITAL | Age: 84
LOS: 1 days | Discharge: NURSING, REHAB OR LONG-TERM CARE | End: 2019-08-16
Attending: EMERGENCY MEDICINE | Admitting: EMERGENCY MEDICINE
Payer: MEDICARE

## 2019-08-16 VITALS
TEMPERATURE: 97 F | WEIGHT: 149.91 LBS | OXYGEN SATURATION: 97 % | DIASTOLIC BLOOD PRESSURE: 71 MMHG | RESPIRATION RATE: 16 BRPM | HEART RATE: 58 BPM | SYSTOLIC BLOOD PRESSURE: 178 MMHG | HEIGHT: 66 IN

## 2019-08-16 VITALS
HEART RATE: 55 BPM | TEMPERATURE: 98 F | SYSTOLIC BLOOD PRESSURE: 162 MMHG | RESPIRATION RATE: 16 BRPM | DIASTOLIC BLOOD PRESSURE: 64 MMHG

## 2019-08-16 DIAGNOSIS — Z98.890 OTHER SPECIFIED POSTPROCEDURAL STATES: Chronic | ICD-10-CM

## 2019-08-16 PROCEDURE — 99284 EMERGENCY DEPT VISIT MOD MDM: CPT | Mod: 25

## 2019-08-16 PROCEDURE — 70450 CT HEAD/BRAIN W/O DYE: CPT

## 2019-08-16 PROCEDURE — 99284 EMERGENCY DEPT VISIT MOD MDM: CPT

## 2019-08-16 PROCEDURE — 70450 CT HEAD/BRAIN W/O DYE: CPT | Mod: 26

## 2019-08-16 RX ORDER — INSULIN LISPRO 100/ML
5 VIAL (ML) SUBCUTANEOUS
Qty: 0 | Refills: 0 | DISCHARGE

## 2019-08-16 RX ORDER — POTASSIUM CHLORIDE 20 MEQ
1 PACKET (EA) ORAL
Qty: 0 | Refills: 0 | DISCHARGE

## 2019-08-16 RX ORDER — SIMVASTATIN 20 MG/1
1 TABLET, FILM COATED ORAL
Qty: 0 | Refills: 0 | DISCHARGE

## 2019-08-16 RX ORDER — AMLODIPINE BESYLATE 2.5 MG/1
1 TABLET ORAL
Qty: 0 | Refills: 0 | DISCHARGE

## 2019-08-16 RX ORDER — LORATADINE 10 MG/1
1 TABLET ORAL
Qty: 0 | Refills: 0 | DISCHARGE

## 2019-08-16 NOTE — ED PROVIDER NOTE - OBJECTIVE STATEMENT
89 y/o M trip and fall with abrasion to head.  No LOC, no change in mental status.  no blood thinners.

## 2019-08-16 NOTE — ED PROVIDER NOTE - CHPI ED SYMPTOMS NEG
no nausea/no change in level of consciousness/no loss of consciousness/no vomiting/no blurred vision

## 2019-08-16 NOTE — ED ADULT NURSE NOTE - OBJECTIVE STATEMENT
Presents to ED via amb from [facility]. According to EMS pt fell yesterday hitting head. Hx: mild dementia.  Pt is his baseline. Denies visual disturbances, nausea or pain. Color good. DELACRUZ freely. Presents to ED via amb from [facility]. According to EMS pt fell yesterday hitting head. Hx: mild dementia.  Pt is his baseline. Denies visual disturbances, nausea or pain. Color good. DELACRUZ freely. Skin tear to scalp- top of head noted

## 2019-10-03 ENCOUNTER — APPOINTMENT (OUTPATIENT)
Dept: CARDIOLOGY | Facility: CLINIC | Age: 84
End: 2019-10-03

## 2019-10-20 ENCOUNTER — EMERGENCY (EMERGENCY)
Facility: HOSPITAL | Age: 84
LOS: 1 days | Discharge: NURSING, REHAB OR LONG-TERM CARE | End: 2019-10-20
Attending: EMERGENCY MEDICINE | Admitting: EMERGENCY MEDICINE
Payer: MEDICARE

## 2019-10-20 VITALS
TEMPERATURE: 98 F | OXYGEN SATURATION: 98 % | RESPIRATION RATE: 18 BRPM | SYSTOLIC BLOOD PRESSURE: 164 MMHG | DIASTOLIC BLOOD PRESSURE: 70 MMHG | HEART RATE: 77 BPM

## 2019-10-20 VITALS
WEIGHT: 154.1 LBS | SYSTOLIC BLOOD PRESSURE: 183 MMHG | DIASTOLIC BLOOD PRESSURE: 82 MMHG | RESPIRATION RATE: 20 BRPM | HEIGHT: 68 IN | HEART RATE: 81 BPM | OXYGEN SATURATION: 97 % | TEMPERATURE: 98 F

## 2019-10-20 DIAGNOSIS — Z98.890 OTHER SPECIFIED POSTPROCEDURAL STATES: Chronic | ICD-10-CM

## 2019-10-20 DIAGNOSIS — R69 ILLNESS, UNSPECIFIED: ICD-10-CM

## 2019-10-20 DIAGNOSIS — F03.91 UNSPECIFIED DEMENTIA WITH BEHAVIORAL DISTURBANCE: ICD-10-CM

## 2019-10-20 LAB
ALBUMIN SERPL ELPH-MCNC: 3 G/DL — LOW (ref 3.3–5)
ALP SERPL-CCNC: 84 U/L — SIGNIFICANT CHANGE UP (ref 30–120)
ALT FLD-CCNC: 33 U/L DA — SIGNIFICANT CHANGE UP (ref 10–60)
AMPHET UR-MCNC: NEGATIVE — SIGNIFICANT CHANGE UP
ANION GAP SERPL CALC-SCNC: 5 MMOL/L — SIGNIFICANT CHANGE UP (ref 5–17)
APAP SERPL-MCNC: <1 UG/ML — LOW (ref 10–30)
APPEARANCE UR: CLEAR — SIGNIFICANT CHANGE UP
AST SERPL-CCNC: 29 U/L — SIGNIFICANT CHANGE UP (ref 10–40)
BACTERIA # UR AUTO: ABNORMAL
BARBITURATES UR SCN-MCNC: NEGATIVE — SIGNIFICANT CHANGE UP
BASOPHILS # BLD AUTO: 0.03 K/UL — SIGNIFICANT CHANGE UP (ref 0–0.2)
BASOPHILS NFR BLD AUTO: 0.4 % — SIGNIFICANT CHANGE UP (ref 0–2)
BENZODIAZ UR-MCNC: NEGATIVE — SIGNIFICANT CHANGE UP
BILIRUB SERPL-MCNC: 0.2 MG/DL — SIGNIFICANT CHANGE UP (ref 0.2–1.2)
BILIRUB UR-MCNC: NEGATIVE — SIGNIFICANT CHANGE UP
BUN SERPL-MCNC: 30 MG/DL — HIGH (ref 7–23)
CALCIUM SERPL-MCNC: 8.5 MG/DL — SIGNIFICANT CHANGE UP (ref 8.4–10.5)
CHLORIDE SERPL-SCNC: 99 MMOL/L — SIGNIFICANT CHANGE UP (ref 96–108)
CO2 SERPL-SCNC: 33 MMOL/L — HIGH (ref 22–31)
COCAINE METAB.OTHER UR-MCNC: NEGATIVE — SIGNIFICANT CHANGE UP
COLOR SPEC: YELLOW — SIGNIFICANT CHANGE UP
CREAT SERPL-MCNC: 1.3 MG/DL — SIGNIFICANT CHANGE UP (ref 0.5–1.3)
DIFF PNL FLD: NEGATIVE — SIGNIFICANT CHANGE UP
EOSINOPHIL # BLD AUTO: 0.12 K/UL — SIGNIFICANT CHANGE UP (ref 0–0.5)
EOSINOPHIL NFR BLD AUTO: 1.5 % — SIGNIFICANT CHANGE UP (ref 0–6)
EPI CELLS # UR: SIGNIFICANT CHANGE UP
ETHANOL SERPL-MCNC: <3 MG/DL — SIGNIFICANT CHANGE UP (ref 0–3)
GLUCOSE BLDC GLUCOMTR-MCNC: 236 MG/DL — HIGH (ref 70–99)
GLUCOSE SERPL-MCNC: 231 MG/DL — HIGH (ref 70–99)
GLUCOSE UR QL: 50 MG/DL
HCT VFR BLD CALC: 29.8 % — LOW (ref 39–50)
HGB BLD-MCNC: 9.8 G/DL — LOW (ref 13–17)
HYALINE CASTS # UR AUTO: ABNORMAL /LPF
IMM GRANULOCYTES NFR BLD AUTO: 0.3 % — SIGNIFICANT CHANGE UP (ref 0–1.5)
KETONES UR-MCNC: NEGATIVE — SIGNIFICANT CHANGE UP
LEUKOCYTE ESTERASE UR-ACNC: NEGATIVE — SIGNIFICANT CHANGE UP
LYMPHOCYTES # BLD AUTO: 1.8 K/UL — SIGNIFICANT CHANGE UP (ref 1–3.3)
LYMPHOCYTES # BLD AUTO: 23 % — SIGNIFICANT CHANGE UP (ref 13–44)
MCHC RBC-ENTMCNC: 31.6 PG — SIGNIFICANT CHANGE UP (ref 27–34)
MCHC RBC-ENTMCNC: 32.9 GM/DL — SIGNIFICANT CHANGE UP (ref 32–36)
MCV RBC AUTO: 96.1 FL — SIGNIFICANT CHANGE UP (ref 80–100)
METHADONE UR-MCNC: NEGATIVE — SIGNIFICANT CHANGE UP
MONOCYTES # BLD AUTO: 1.06 K/UL — HIGH (ref 0–0.9)
MONOCYTES NFR BLD AUTO: 13.5 % — SIGNIFICANT CHANGE UP (ref 2–14)
NEUTROPHILS # BLD AUTO: 4.8 K/UL — SIGNIFICANT CHANGE UP (ref 1.8–7.4)
NEUTROPHILS NFR BLD AUTO: 61.3 % — SIGNIFICANT CHANGE UP (ref 43–77)
NITRITE UR-MCNC: NEGATIVE — SIGNIFICANT CHANGE UP
NRBC # BLD: 0 /100 WBCS — SIGNIFICANT CHANGE UP (ref 0–0)
OPIATES UR-MCNC: NEGATIVE — SIGNIFICANT CHANGE UP
PCP SPEC-MCNC: SIGNIFICANT CHANGE UP
PCP UR-MCNC: NEGATIVE — SIGNIFICANT CHANGE UP
PH UR: 6 — SIGNIFICANT CHANGE UP (ref 5–8)
PLATELET # BLD AUTO: 214 K/UL — SIGNIFICANT CHANGE UP (ref 150–400)
POTASSIUM SERPL-MCNC: 3.1 MMOL/L — LOW (ref 3.5–5.3)
POTASSIUM SERPL-SCNC: 3.1 MMOL/L — LOW (ref 3.5–5.3)
PROT SERPL-MCNC: 6.7 G/DL — SIGNIFICANT CHANGE UP (ref 6–8.3)
PROT UR-MCNC: 100 MG/DL
RBC # BLD: 3.1 M/UL — LOW (ref 4.2–5.8)
RBC # FLD: 14.7 % — HIGH (ref 10.3–14.5)
RBC CASTS # UR COMP ASSIST: SIGNIFICANT CHANGE UP /HPF (ref 0–4)
SALICYLATES SERPL-MCNC: 1.1 MG/DL — LOW (ref 2.8–20)
SODIUM SERPL-SCNC: 137 MMOL/L — SIGNIFICANT CHANGE UP (ref 135–145)
SP GR SPEC: 1.01 — SIGNIFICANT CHANGE UP (ref 1.01–1.02)
THC UR QL: NEGATIVE — SIGNIFICANT CHANGE UP
TSH SERPL-MCNC: 31.3 UIU/ML — HIGH (ref 0.27–4.2)
UROBILINOGEN FLD QL: NEGATIVE MG/DL — SIGNIFICANT CHANGE UP
VALPROATE SERPL-MCNC: 29 UG/ML — LOW (ref 50–100)
WBC # BLD: 7.83 K/UL — SIGNIFICANT CHANGE UP (ref 3.8–10.5)
WBC # FLD AUTO: 7.83 K/UL — SIGNIFICANT CHANGE UP (ref 3.8–10.5)
WBC UR QL: SIGNIFICANT CHANGE UP

## 2019-10-20 PROCEDURE — 93010 ELECTROCARDIOGRAM REPORT: CPT

## 2019-10-20 PROCEDURE — 36415 COLL VENOUS BLD VENIPUNCTURE: CPT

## 2019-10-20 PROCEDURE — 85027 COMPLETE CBC AUTOMATED: CPT

## 2019-10-20 PROCEDURE — 99284 EMERGENCY DEPT VISIT MOD MDM: CPT

## 2019-10-20 PROCEDURE — 80307 DRUG TEST PRSMV CHEM ANLYZR: CPT

## 2019-10-20 PROCEDURE — 93005 ELECTROCARDIOGRAM TRACING: CPT

## 2019-10-20 PROCEDURE — 99284 EMERGENCY DEPT VISIT MOD MDM: CPT | Mod: 25

## 2019-10-20 PROCEDURE — 82962 GLUCOSE BLOOD TEST: CPT

## 2019-10-20 PROCEDURE — 84443 ASSAY THYROID STIM HORMONE: CPT

## 2019-10-20 PROCEDURE — 81001 URINALYSIS AUTO W/SCOPE: CPT

## 2019-10-20 PROCEDURE — 80164 ASSAY DIPROPYLACETIC ACD TOT: CPT

## 2019-10-20 PROCEDURE — 80053 COMPREHEN METABOLIC PANEL: CPT

## 2019-10-20 PROCEDURE — 90792 PSYCH DIAG EVAL W/MED SRVCS: CPT | Mod: GT

## 2019-10-20 RX ORDER — HYDRALAZINE HCL 50 MG
1 TABLET ORAL
Qty: 0 | Refills: 0 | DISCHARGE

## 2019-10-20 RX ORDER — HYDRALAZINE HCL 50 MG
25 TABLET ORAL ONCE
Refills: 0 | Status: COMPLETED | OUTPATIENT
Start: 2019-10-20 | End: 2019-10-20

## 2019-10-20 RX ORDER — POLYETHYLENE GLYCOL 3350 17 G/17G
17 POWDER, FOR SOLUTION ORAL
Qty: 0 | Refills: 0 | DISCHARGE

## 2019-10-20 RX ORDER — DULOXETINE HYDROCHLORIDE 30 MG/1
1 CAPSULE, DELAYED RELEASE ORAL
Qty: 0 | Refills: 0 | DISCHARGE

## 2019-10-20 RX ORDER — ATENOLOL 25 MG/1
50 TABLET ORAL ONCE
Refills: 0 | Status: COMPLETED | OUTPATIENT
Start: 2019-10-20 | End: 2019-10-20

## 2019-10-20 RX ORDER — REPAGLINIDE 1 MG/1
1 TABLET ORAL
Qty: 0 | Refills: 0 | DISCHARGE

## 2019-10-20 RX ORDER — INSULIN DETEMIR 100/ML (3)
10 INSULIN PEN (ML) SUBCUTANEOUS
Qty: 0 | Refills: 0 | DISCHARGE

## 2019-10-20 RX ORDER — AMLODIPINE BESYLATE 2.5 MG/1
1 TABLET ORAL
Qty: 0 | Refills: 0 | DISCHARGE

## 2019-10-20 RX ORDER — SENNA PLUS 8.6 MG/1
2 TABLET ORAL
Qty: 0 | Refills: 0 | DISCHARGE

## 2019-10-20 RX ORDER — OMEGA-3 ACID ETHYL ESTERS 1 G
1 CAPSULE ORAL
Qty: 0 | Refills: 0 | DISCHARGE

## 2019-10-20 RX ORDER — ASPIRIN/CALCIUM CARB/MAGNESIUM 324 MG
1 TABLET ORAL
Qty: 0 | Refills: 0 | DISCHARGE

## 2019-10-20 RX ORDER — REPAGLINIDE 1 MG/1
0.5 TABLET ORAL
Qty: 0 | Refills: 0 | DISCHARGE

## 2019-10-20 RX ORDER — DIVALPROEX SODIUM 500 MG/1
1 TABLET, DELAYED RELEASE ORAL
Qty: 0 | Refills: 0 | DISCHARGE

## 2019-10-20 RX ORDER — LEVOTHYROXINE SODIUM 125 MCG
1 TABLET ORAL
Qty: 0 | Refills: 0 | DISCHARGE

## 2019-10-20 RX ORDER — SENNA PLUS 8.6 MG/1
1 TABLET ORAL
Qty: 0 | Refills: 0 | DISCHARGE

## 2019-10-20 RX ORDER — ATENOLOL 25 MG/1
1 TABLET ORAL
Qty: 0 | Refills: 0 | DISCHARGE

## 2019-10-20 RX ORDER — POTASSIUM CHLORIDE 20 MEQ
40 PACKET (EA) ORAL ONCE
Refills: 0 | Status: COMPLETED | OUTPATIENT
Start: 2019-10-20 | End: 2019-10-20

## 2019-10-20 RX ORDER — AMLODIPINE BESYLATE 2.5 MG/1
10 TABLET ORAL ONCE
Refills: 0 | Status: COMPLETED | OUTPATIENT
Start: 2019-10-20 | End: 2019-10-20

## 2019-10-20 RX ORDER — CHOLECALCIFEROL (VITAMIN D3) 125 MCG
1 CAPSULE ORAL
Qty: 0 | Refills: 0 | DISCHARGE

## 2019-10-20 RX ADMIN — ATENOLOL 50 MILLIGRAM(S): 25 TABLET ORAL at 15:25

## 2019-10-20 RX ADMIN — AMLODIPINE BESYLATE 10 MILLIGRAM(S): 2.5 TABLET ORAL at 15:25

## 2019-10-20 RX ADMIN — Medication 25 MILLIGRAM(S): at 15:25

## 2019-10-20 RX ADMIN — Medication 40 MILLIEQUIVALENT(S): at 12:07

## 2019-10-20 NOTE — ED BEHAVIORAL HEALTH ASSESSMENT NOTE - RISK ASSESSMENT
risk factors: male, , chronic medical conditions; protective factors: domiciled, good social support, no access to firearms, no prior psych history and no prior suicide attempts Low Acute Suicide Risk

## 2019-10-20 NOTE — ED BEHAVIORAL HEALTH ASSESSMENT NOTE - DESCRIPTION
CKD, heart failure, dementia, hypothyroidism, and DM 89 y/o  (wife  in May 2019), retired  male domiciled at [facility];  to wife in [place]; one son living in [place] w/children and grandchildren; daughter  at 35 y/o from cancer and had 2 children of her own; retired - worked as a printer for [employer] The patient has been calm and cooperative in the ED. He did not require any prn medications for agitation or anxiety.

## 2019-10-20 NOTE — ED PROVIDER NOTE - NSFOLLOWUPINSTRUCTIONS_ED_ALL_ED_FT
[facility] - [facility](R) Instructions(ER/ED)     Dementia    WHAT YOU NEED TO KNOW:    Dementia is a condition that causes loss of memory, thought control, and judgment. Alzheimer disease is the most common cause of dementia. Other common causes are loss of blood flow or nerve damage in the brain, and long-term alcohol or drug use. Dementia cannot be cured or prevented, but treatment may slow or reduce your symptoms.     DISCHARGE INSTRUCTIONS:  Follow up with your psychiatrist tomorrow for medication adjustment. Depakote level only 29. May need adjustment.    Return to the emergency department if you or someone close to you notices:     You have signs of delirium, such as extreme confusion, and seeing or hearing things that are not there.      You become angry or violent, and cannot be calmed down.      You faint and cannot be woken.     Contact your healthcare provider if you or someone close to you notices:     You have a fever.      You have increased confusion, behavior, or mood changes.      You have questions or concerns about your condition or care.    Medicines: You may need any of the following:     Antianxiety medicine may be used to help reduce anxiety and keep you calm.      Antipsychotics may be used to help control any anger or violence.      Antidepressants may be used to help improve your mood and reduce your symptoms of depression.      Take your medicine as directed. Contact your healthcare provider if you think your medicine is not helping or if you have side effects. Tell him of her if you are allergic to any medicine. Keep a list of the medicines, vitamins, and herbs you take. Include the amounts, and when and why you take them. Bring the list or the pill bottles to follow-up visits. Carry your medicine list with you in case of an emergency.    Follow up with your healthcare provider as directed: Write down your questions so you remember to ask them during your visits. Ask someone to go in with you if you have trouble understanding or remembering what your healthcare provider tells you. The person can take notes for you during the visit and go over the notes with you later.     Manage your dementia: You may begin to need an in-home aide to help you remember your daily tasks. Ask your healthcare provider for a list of organizations that can help. It is best to arrange for help while you are thinking clearly. The following may also help you manage your dementia:     Keep your mind and body active. Do activities that you love, such as art, gardening, or listening to music. Call or visit people often. This will keep your social skills sharp, and may help reduce depression.      Take all of your medicines as directed. This will help control medical conditions, such as high blood pressure, high cholesterol, and diabetes.      Write daily schedules and routines. Record doctor appointments, times to take your medicines, meal times, or any other things to remember. Write down reminders to use the bathroom if you have trouble remembering. You may to ask someone to write things down for you.       Place clocks and calendars where you can see them. This will help you remember appointments and tasks.      Do not smoke. Nicotine and other chemicals in cigarettes and cigars can cause lung damage. Ask your healthcare provider for information if you currently smoke and need help to quit. E-cigarettes or smokeless tobacco still contain nicotine. Talk to your healthcare provider before you use these products.       Eat healthy foods. Examples are fruits, vegetables, whole-grain breads, low-fat dairy, beans, lean meats, and fish. Ask if you need to be on a special diet.

## 2019-10-20 NOTE — ED BEHAVIORAL HEALTH ASSESSMENT NOTE - CURRENT MEDICATION
duloxetine extended release 60 mg daily, Depakote  mg bid,   aspirin 81 mg daily, atenolol 50 mg daily, amlodipine 10 mg daily, insulin 300 units subQ, vitamin d3 1000, prandin 0.5 mg tid, Miralax daily, senna 8.6 mg daily, levothyroxine 125 mcg daily, hydralazine 25 mg bid
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
## 2019-10-20 NOTE — ED ADULT NURSE NOTE - NSIMPLEMENTINTERV_GEN_ALL_ED
Implemented All Fall with Harm Risk Interventions:  [place] to call system. Call bell, personal items and telephone within reach. Instruct patient to call for assistance. Room bathroom lighting operational. Non-slip footwear when patient is off stretcher. Physically safe environment: no spills, clutter or unnecessary equipment. Stretcher in lowest position, wheels locked, appropriate side rails in place. Provide visual cue, wrist band, yellow gown, etc. Monitor gait and stability. Monitor for mental status changes and reorient to person, place, and time. Review medications for side effects contributing to fall risk. Reinforce activity limits and safety measures with patient and family. Provide visual clues: red socks.

## 2019-10-20 NOTE — ED BEHAVIORAL HEALTH ASSESSMENT NOTE - SAFETY PLAN DETAILS
The patient will return to the ED or call 911 if he develops thoughts of suicide. Nursing home and his son are advised to do the same if the patient has suicidal thoughts w/plan or intent or if he becomes aggressive to the point of being unable to deescalate him.
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
## 2019-10-20 NOTE — ED PROVIDER NOTE - PATIENT PORTAL LINK FT
You can access the FollowMyHealth Patient Portal offered by [facility] by registering at the following website: [email or web address]. By joining [employer]’s FollowMyHealth portal, you will also be able to view your health information using other applications (apps) compatible with our system.

## 2019-10-20 NOTE — ED BEHAVIORAL HEALTH ASSESSMENT NOTE - MEDICATIONS (PRESCRIPTIONS, DIRECTIONS)
Med rec advised: adjust dose of VPA to therapeutic if this is effective for patient; consider an agitation plan

## 2020-09-08 NOTE — ED PROVIDER NOTE - CROS ED RESP ALL NEG
negative... [employer] Counseling:  I discussed with the patient the risks of mycophenolate mofetil including but not limited to infection/immunosuppression, GI upset, hypokalemia, hypercholesterolemia, bone marrow suppression, lymphoproliferative disorders, malignancy, GI ulceration/bleed/perforation, colitis, interstitial lung disease, kidney failure, progressive multifocal leukoencephalopathy, and birth defects.  The patient understands that monitoring is required including a baseline creatinine and regular CBC testing. In addition, patient must alert us immediately if symptoms of infection or other concerning signs are noted.

## 2021-10-18 NOTE — PHYSICAL THERAPY INITIAL EVALUATION ADULT - PRECAUTIONS/LIMITATIONS, REHAB EVAL
fall precautions
Bleeding that does not stop/Pain not relieved by Medications/Fever greater than (need to indicate Fahrenheit or Celsius)/Nausea and vomiting that does not stop/Unable to urinate/Inability to tolerate liquids or foods/Increased irritability or sluggishness

## 2021-11-18 NOTE — PROGRESS NOTE ADULT - PROBLEM/PLAN-1
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  The area of positive cancer is as marked on the Mohs map.

## 2022-03-16 NOTE — PROGRESS NOTE ADULT - PROBLEM SELECTOR PROBLEM 7
Orthopedic
Nutrition, metabolism, and development symptoms

## 2022-06-14 NOTE — ED ADULT NURSE NOTE - HARM RISK FACTORS
Problem: Adult Inpatient Plan of Care  Goal: Plan of Care Review  Outcome: Ongoing, Progressing  Goal: Patient-Specific Goal (Individualized)  Outcome: Ongoing, Progressing  Goal: Absence of Hospital-Acquired Illness or Injury  Outcome: Ongoing, Progressing  Goal: Optimal Comfort and Wellbeing  Outcome: Ongoing, Progressing  Goal: Readiness for Transition of Care  Outcome: Ongoing, Progressing     Problem: Bariatric Environmental Safety  Goal: Safety Maintained with Care  Outcome: Ongoing, Progressing     Problem: Impaired Wound Healing  Goal: Optimal Wound Healing  Outcome: Ongoing, Progressing     Problem: Skin Injury Risk Increased  Goal: Skin Health and Integrity  Outcome: Ongoing, Progressing     Problem: Coping Ineffective  Goal: Effective Coping  Outcome: Ongoing, Progressing      yes

## 2022-07-25 NOTE — PROGRESS NOTE ADULT - PROBLEM SELECTOR PROBLEM 8
Prophylactic measure
Improved

## 2023-04-05 NOTE — ED PROVIDER NOTE - PMH
CHF NYHA class I, unspecified failure chronicity, diastolic    Dementia    Diabetes    HTN (hypertension)    Hypothyroid Nsaids Pregnancy And Lactation Text: These medications are considered safe up to 30 weeks gestation. It is excreted in breast milk.

## 2023-06-07 NOTE — H&P ADULT - ASSESSMENT
None known 88 years old male from home, lives with wife, AAO x2 at baseline, ambulates with walker, PMH of HTN, DM and Dementia and PSH of Hernia surgery brought to ED by wife for shortness of breath since last night. Admitted for new onset CHF.

## 2023-09-11 NOTE — ED ADULT NURSE NOTE - SUICIDE SCREENING DEPRESSION
PT  DAUGHTER CALLED AND CANCELLED APT DUE TO PT BEING ADMIT INTO [facility]. STATED THEY WOULD CALL BACK AND [facility] WHEN OR IF NEEDED   Negative

## 2023-11-06 NOTE — ED ADULT NURSE NOTE - NS ED NURSE DC INFO COMPLEXITY
Speech Therapy    Patient not seen in therapy today.    Unavailable due to eating meal.      Re-attempt plan: per established plan of care    Additional details:  Pt eating lunch meal when therapist arrived       OBJECTIVE                   ASSESSMENT      Therapy Participation: This patient was unable to participate in minutes of scheduled therapy with this therapist this session due to Eating lunch meal .            Therapy procedure time and total treatment time can be found documented on the Time Entry flowsheet   Simple: Patient demonstrates quick and easy understanding

## 2024-03-30 NOTE — PATIENT PROFILE ADULT - DO YOU FEEL UNSAFE AT WORK?
not applicable Patient seen and examined at bedside.    --Anticoagulation--    T(C): 37.5 (03-30-24 @ 00:00), Max: 38.5 (03-29-24 @ 08:00)  HR: 58 (03-30-24 @ 00:35) (40 - 92)  BP: --  RR: 31 (03-30-24 @ 00:00) (16 - 38)  SpO2: 100% (03-30-24 @ 00:35) (97% - 100%)  Wt(kg): --    Exam: Intubated, SAVI, right periorbital edema improving, PERRL, +cough/gag/OB, FC, Right side spont AG, LUE wd, LLE wds

## 2024-12-26 NOTE — PROGRESS NOTE ADULT - SUBJECTIVE AND OBJECTIVE BOX
Physician Progress Note      PATIENT:               HARSHIL AMEZCUA  CSN #:                  [id number]  :                       1931  ADMIT DATE:       2024 8:45 AM  DISCH DATE:        2024 10:12 AM  RESPONDING  PROVIDER #:        Aaron Murguia MD          QUERY TEXT:    Pt admitted with sepsis.  Pt noted to have required supplemental oxygen. If   possible, please document in the progress notes and discharge summary if you   are evaluating and/or treating any of the following:    The medical record reflects the following:  Risk Factors: sepsis, pneumonia  Clinical Indicators:   0851 RR 26, O2 93 on 3L NC   1032 RR 37   1246 RR 43 O2 92   Oxygen increased to 6L NC  Treatment: supplemental O2, DNR  Options provided:  -- Acute respiratory failure with hypoxia  -- Other - I will add my own diagnosis  -- Disagree - Not applicable / Not valid  -- Disagree - Clinically unable to determine / Unknown  -- Refer to Clinical Documentation Reviewer    PROVIDER RESPONSE TEXT:    This patient is in acute respiratory failure with hypoxia.    Query created by: Nighat Bernardo on 2024 12:51 PM      Electronically signed by:  Aaron Murguia MD 2024 6:49 AM           Patient denies CP, SOB ROS (-)    amLODIPine   Tablet 10 milliGRAM(s) Oral daily  aspirin  chewable 81 milliGRAM(s) Oral daily  ATENolol  Tablet 50 milliGRAM(s) Oral daily  donepezil 10 milliGRAM(s) Oral at bedtime  DULoxetine 30 milliGRAM(s) Oral daily  ergocalciferol 04119 Unit(s) Oral <User Schedule>  furosemide    Tablet 40 milliGRAM(s) Oral daily  heparin  Injectable 5000 Unit(s) SubCutaneous every 12 hours  insulin glargine Injectable (LANTUS) 7 Unit(s) SubCutaneous at bedtime  insulin lispro (HumaLOG) corrective regimen sliding scale   SubCutaneous three times a day before meals  levothyroxine 112 MICROGram(s) Oral daily  pramipexole 0.25 milliGRAM(s) Oral three times a day  pregabalin 200 milliGRAM(s) Oral at bedtime  QUEtiapine 25 milliGRAM(s) Oral daily  simvastatin 5 milliGRAM(s) Oral at bedtime                            11.6   6.8   )-----------( 255      ( 19 Feb 2018 07:07 )             36.8       Hemoglobin: 11.6 g/dL (02-19 @ 07:07)  Hemoglobin: 10.1 g/dL (02-18 @ 07:22)  Hemoglobin: 10.5 g/dL (02-17 @ 07:50)  Hemoglobin: 10.9 g/dL (02-16 @ 17:13)      02-19    141  |  101  |  36<H>  ----------------------------<  93  3.7   |  33<H>  |  1.35<H>    Ca    8.1<L>      19 Feb 2018 07:07  Phos  4.2     02-19  Mg     2.1     02-19      Creatinine Trend: 1.35<--, 1.33<--, 1.61<--, 1.45<--    COAGS:     CARDIAC MARKERS ( 17 Feb 2018 07:50 )  0.031 ng/mL / x     / 185 U/L / x     / 5.0 ng/mL  CARDIAC MARKERS ( 17 Feb 2018 04:03 )  0.033 ng/mL / x     / 178 U/L / x     / 4.7 ng/mL  CARDIAC MARKERS ( 16 Feb 2018 17:13 )  <0.015 ng/mL / x     / 171 U/L / x     / 3.5 ng/mL        T(C): 36.4 (02-19-18 @ 11:12), Max: 36.7 (02-18-18 @ 19:58)  HR: 59 (02-19-18 @ 11:12) (56 - 60)  BP: 161/55 (02-19-18 @ 11:12) (131/46 - 171/58)  RR: 18 (02-19-18 @ 11:12) (18 - 18)  SpO2: 100% (02-19-18 @ 11:12) (100% - 100%)  Wt(kg): --    I&O's Summary    18 Feb 2018 07:01  -  19 Feb 2018 07:00  --------------------------------------------------------  IN: 150 mL / OUT: 1300 mL / NET: -1150 mL    19 Feb 2018 07:01  -  19 Feb 2018 13:36  --------------------------------------------------------  IN: 400 mL / OUT: 0 mL / NET: 400 mL          HEENT:   Normal oral mucosa, PERRL, EOMI	  Lymphatic: No lymphadenopathy , no edema  Cardiovascular: Normal S1 S2, No JVD, No murmurs , Peripheral pulses palpable 2+ bilaterally  Respiratory: Lungs clear to auscultation, normal effort 	  Gastrointestinal:  Soft, Non-tender, + BS	    TELEMETRY: 	 Nsr      DIAGNOSTIC TESTING:  [ ] Echocardiogram:   [ ]  Catheterization:  [ ] Stress Test:    OTHER: 	    ASSESSMENT/PLAN: 	88y Male PMH mild dementia admitted with dyspnea. CXR is concerning for pulmonary edema. No echo on file. He denies palpitations, syncope, nor angina.      - cand D/C tele  - Tolerating PO lasix  - D/C planning per med    Michael Florentino MD, [facility], [facility]  [address] [place] [address]  PHONE:  [phone]  BEEPER : [phone]

## 2025-02-06 NOTE — PROGRESS NOTE ADULT - PROBLEM SELECTOR PROBLEM 5
[facility] [address], [place] [address] office: [phone] fax: [phone]       Physical Therapy: TREATMENT/PROGRESS NOTE   Patient: Carlos Eduardo Lima (33 y.o. male)   Examination Date: 2025   :  1991 MRN: [id number]   Visit #:        2025                                      28 / total  Insurance Allowable Auth Needed   BMN ($30 copay) []Yes    [x]No    Ins[place] / [place] / Product Type: *No Product type* /   Insurance ID: [id number] - ([employer])  Secondary Insurance (if applicable):    Treatment Diagnosis: -    ICD-10-CM    1. Weakness of right arm  R29.898       2. Right shoulder pain, unspecified chronicity  M25.511          Medical Diagnosis:  Chronic right shoulder pain [M25.511, G89.29]   Referring Physician: Lanie Ryan APR*  PCP: Lanie Ryan APRN - CNP     Plan of care signed (Y/N): Y    Date of Patient follow up with Physician:      Plan of Care Report: NO  POC update due: (10 visits /OR AUTH LIMITS, whichever is less) - 2025                                             Medical History:  Comorbidities:  None  Relevant Medical History: R hip surgery                                          Precautions/ Contra-indications:           Latex allergy:  NO  Pacemaker:    NO  Contraindications for Manipulation: None  Date of Surgery: n/a  Other:    Red Flags:  None    Suicide Screening:   The patient did not verbalize a primary behavioral concern, suicidal ideation, suicidal intent, or demonstrate suicidal behaviors.    Preferred Language for Healthcare:   [x] English       [] other:    SUBJECTIVE EXAMINATION     Patient stated complaint:  Pt reports he has been doing OK, still about the same. No significant pain consistently but with movement he has pain mostly L upper trap and some front of R shoulder     Test used Initial score  24   Pain Summary VAS  Hypokalemia

## 2025-02-11 NOTE — PROGRESS NOTE ADULT - PROBLEM SELECTOR PLAN 2
- will c/w Lasix 80 iv bid. Neg 2.3L over last 24 hours. Appears euvolemic. Will transition to PO lasix.   - Monitor lytes  - tele monitor  - strict I&O's with daily rate  - not on ACE-I or ARB due to CKD  - Echo pending  - f/u LE dopplers - will c/w Lasix 80 iv bid. Neg 2.3L over last 24 hours. Possible transition to PO lasix tomorrow.   - Monitor lytes  - tele monitor  - strict I&O's with daily rate  - not on ACE-I or ARB due to CKD  - Echo pending  - f/u LE dopplers Admission